# Patient Record
Sex: MALE | Race: BLACK OR AFRICAN AMERICAN | NOT HISPANIC OR LATINO | Employment: FULL TIME | URBAN - METROPOLITAN AREA
[De-identification: names, ages, dates, MRNs, and addresses within clinical notes are randomized per-mention and may not be internally consistent; named-entity substitution may affect disease eponyms.]

---

## 2017-01-31 ENCOUNTER — OFFICE VISIT (OUTPATIENT)
Dept: UROLOGY | Facility: CLINIC | Age: 62
End: 2017-01-31
Payer: COMMERCIAL

## 2017-01-31 ENCOUNTER — LAB VISIT (OUTPATIENT)
Dept: LAB | Facility: HOSPITAL | Age: 62
End: 2017-01-31
Attending: ANESTHESIOLOGY
Payer: COMMERCIAL

## 2017-01-31 ENCOUNTER — HOSPITAL ENCOUNTER (OUTPATIENT)
Dept: CARDIOLOGY | Facility: CLINIC | Age: 62
Discharge: HOME | End: 2017-01-31
Attending: ANESTHESIOLOGY
Payer: COMMERCIAL

## 2017-01-31 VITALS
DIASTOLIC BLOOD PRESSURE: 79 MMHG | BODY MASS INDEX: 32.48 KG/M2 | SYSTOLIC BLOOD PRESSURE: 148 MMHG | HEART RATE: 70 BPM | HEIGHT: 68 IN | WEIGHT: 214.31 LBS

## 2017-01-31 DIAGNOSIS — Z01.818 PREOP TESTING: ICD-10-CM

## 2017-01-31 DIAGNOSIS — C61 PROSTATE CANCER: Primary | ICD-10-CM

## 2017-01-31 DIAGNOSIS — C61 PROSTATE CANCER: ICD-10-CM

## 2017-01-31 LAB
ABO + RH BLD: NORMAL
ANION GAP SERPL CALC-SCNC: 11 MMOL/L
BLD GP AB SCN CELLS X3 SERPL QL: NORMAL
BUN SERPL-MCNC: 13 MG/DL
CALCIUM SERPL-MCNC: 9.7 MG/DL
CHLORIDE SERPL-SCNC: 103 MMOL/L
CO2 SERPL-SCNC: 26 MMOL/L
CREAT SERPL-MCNC: 1.1 MG/DL
ERYTHROCYTE [DISTWIDTH] IN BLOOD BY AUTOMATED COUNT: 14.1 %
EST. GFR  (AFRICAN AMERICAN): >60 ML/MIN/1.73 M^2
EST. GFR  (NON AFRICAN AMERICAN): >60 ML/MIN/1.73 M^2
GLUCOSE SERPL-MCNC: 111 MG/DL
HCT VFR BLD AUTO: 39.8 %
HGB BLD-MCNC: 13.2 G/DL
MCH RBC QN AUTO: 27.2 PG
MCHC RBC AUTO-ENTMCNC: 33.2 %
MCV RBC AUTO: 82 FL
PLATELET # BLD AUTO: 184 K/UL
PMV BLD AUTO: 10.5 FL
POTASSIUM SERPL-SCNC: 4 MMOL/L
RBC # BLD AUTO: 4.85 M/UL
SODIUM SERPL-SCNC: 140 MMOL/L
WBC # BLD AUTO: 4.08 K/UL

## 2017-01-31 PROCEDURE — 85027 COMPLETE CBC AUTOMATED: CPT

## 2017-01-31 PROCEDURE — 93000 ELECTROCARDIOGRAM COMPLETE: CPT | Mod: S$GLB,,, | Performed by: INTERNAL MEDICINE

## 2017-01-31 PROCEDURE — 99213 OFFICE O/P EST LOW 20 MIN: CPT | Mod: S$GLB,,, | Performed by: UROLOGY

## 2017-01-31 PROCEDURE — 3078F DIAST BP <80 MM HG: CPT | Mod: S$GLB,,, | Performed by: UROLOGY

## 2017-01-31 PROCEDURE — 99999 PR PBB SHADOW E&M-EST. PATIENT-LVL III: CPT | Mod: PBBFAC,,, | Performed by: UROLOGY

## 2017-01-31 PROCEDURE — 86900 BLOOD TYPING SEROLOGIC ABO: CPT

## 2017-01-31 PROCEDURE — 86850 RBC ANTIBODY SCREEN: CPT

## 2017-01-31 PROCEDURE — 80048 BASIC METABOLIC PNL TOTAL CA: CPT

## 2017-01-31 PROCEDURE — 1159F MED LIST DOCD IN RCRD: CPT | Mod: S$GLB,,, | Performed by: UROLOGY

## 2017-01-31 PROCEDURE — 99999 PR PBB SHADOW E&M-EST. PATIENT-LVL I: CPT | Mod: PBBFAC,,,

## 2017-01-31 PROCEDURE — 3077F SYST BP >= 140 MM HG: CPT | Mod: S$GLB,,, | Performed by: UROLOGY

## 2017-01-31 PROCEDURE — 99499 UNLISTED E&M SERVICE: CPT | Mod: S$GLB,,, | Performed by: STUDENT IN AN ORGANIZED HEALTH CARE EDUCATION/TRAINING PROGRAM

## 2017-01-31 NOTE — MR AVS SNAPSHOT
[facility]  [address]  [place] [address]  Phone: [phone]                  Sanchez Purcell   2017 1:45 PM   Office Visit    Description:  Male : 1955   Provider:  Champ Chavez MD   Department:  [facility]           Diagnoses this Visit        Comments    Prostate cancer    -  Primary            To Do List           Future Appointments        Provider Department Dept Phone    2017 2:30 PM PREOP,  UROLOGY CANCER [facility] [phone]    2017 10:15 AM Champ Chavez MD [facility] [phone]      Your Future Surgeries/Procedures     Feb 10, 2017   Surgery with Champ Chavez MD   [facility]    [address] [facility] [address] [phone]              Goals (5 Years of Data)     None      [facility]     [facility] Care Line - / Assistance  Registered nurses in the [facility] provide clinical advisement, health education, appointment booking, and other advisory services.  Call for this free service at [phone].             Medications           Message regarding Medications     Verify the changes and/or additions to your medication regime listed below are the same as discussed with your clinician today.  If any of these changes or additions are incorrect, please notify your healthcare provider.             Verify that the below list of medications is an accurate representation of the medications you are currently taking.  If none reported, the list may be blank. If incorrect, please contact your healthcare provider. Carry this list with you in case of emergency.           Current Medications     blood sugar diagnostic Strp Free style lite    losartan (COZAAR) 50 MG tablet TAKE 1 TABLET(50 MG) BY MOUTH EVERY DAY    metformin (GLUCOPHAGE) 1000 MG tablet TAKE 1 TABLET BY MOUTH EVERY 12 HOURS          "  Clinical Reference Information           Vital Signs - Last Recorded  Most recent update: 1/31/2017  1:27 PM by Nichole Rosas MA    BP Pulse Ht Wt BMI    (!) 148/79 (BP Location: Left arm, Patient Position: Sitting, BP Method: Automatic) 70 5' 8" (1.727 m) 97.2 kg (214 lb 4.6 oz) 32.58 kg/m2      Blood Pressure          Most Recent Value    BP  (!)  148/79      Allergies as of 1/31/2017     No Known Drug Allergies      Immunizations Administered on Date of Encounter - 1/31/2017     None      "

## 2017-01-31 NOTE — PROGRESS NOTES
Subjective:       Patient ID: Sanchez Purcell is a 61 y.o. male.    Chief Complaint: No chief complaint on file.    HPI Comments: Sanchez Purcell is a 61 y.o. male recently diagnosed with prostate cancer. Positive family history for prostate cancer, brother recently  from prostate cancer at 65. Consult from Dr. Cee. Here with wife Sarah. Used to work worked [employer].Continent and potent. Doing Kegels.     PSAi- 7.2  Stage- T1C  Volume- 32 ccs  Biopsy: [place] 3+4 left apex 2/2 80%, left middle 2/2 60%, right middle 1/2 15%: [place] 6 right apex 2/2 5%  STEPHANE score- 4  Intermediate risk disease     Past Medical History:  Diabetes mellitus   Diabetes mellitus type II ( 7 years)   Heart murmur    Hyperlipidemia   Hypertension    Review of Systems   Constitutional: Negative for appetite change, chills, fatigue, fever and unexpected weight change.   HENT: Negative for nosebleeds.    Respiratory: Negative for shortness of breath and wheezing.    Cardiovascular: Negative for chest pain, palpitations and leg swelling.   Gastrointestinal: Negative for abdominal distention, abdominal pain, constipation, diarrhea, nausea and vomiting.   Musculoskeletal: Negative for arthralgias and back pain.   Skin: Negative for pallor.   Neurological: Negative for dizziness, seizures and syncope.   Hematological: Negative for adenopathy.   Psychiatric/Behavioral: Negative for dysphoric mood.       Objective:      Physical Exam   Constitutional: He is oriented to person, place, and time. He appears well-developed and well-nourished. No distress.   HENT:   Head: Atraumatic.   Neck: No tracheal deviation present.   Cardiovascular: Normal rate.    Pulmonary/Chest: Effort normal. No respiratory distress. He has no wheezes.   Abdominal: Soft. Bowel sounds are normal. He exhibits no distension and no mass. There is no tenderness. There is no rebound and no guarding.   Neurological: He is alert and oriented to person, place, and time.    Skin: Skin is warm and dry. He is not diaphoretic.     Psychiatric: He has a normal mood and affect. His behavior is normal. Judgment and thought content normal.       Assessment:       1. Prostate cancer        Plan:         Today's visit was spent almost entirely on counseling. We reviewed his diagnosis, stage, grade, risk group, and prognosis. We discussed [place] and STEPHANE risk stratification We reviewed the [facility] nomogram. We discussed the concept of low risk, moderate risk, and high risk disease. We discussed the different treatment options including active surveillance (as well as the surveillance protocol), prostate brachytherapy, IMRT, IGRT, cryotherapy, and both open and robotic prostatectomy.We also discussed the advantages, disadvantages, risks and benefits, as well as complications of each option. Regarding radiation therapy we discussed treatment planning, the different techniques, short and long term complications. These included radiation cystitis, radiation proctitis, and impotence. We discussed success, failure, and salvage therapeutic options. We discussed surgical therapy in depth including preoperative preparation, surgical technique (including bladder neck and nerve-sparing techniques), postoperative recuperation and recovery, and short and long term complications including UTI, bleeding, blood clots,catheter dislodgement, etc. We discussed the risks of reoperation, incontinence, impotence, and recurrence. We discussed preop and postop Kegels, post op penile rehab, and treatment options for incontinence and impotence. We discussed rates of cancer free survival and recurrence, as well as salvage therapeutic options. We discussed the possible  indications for adjuvant radiation therapy. I answered questions and addressed concerns.   Is scheduled for robotic assisted laparoscopic prostatectomy on February 22 nd.

## 2017-01-31 NOTE — PROGRESS NOTES
Urology ([facility]) H&P for upcoming procedure  Staff:  Dr. Champ Chavez MD    Is this patient in a research study?  No    CC: prostate adencarcinoma    HPI:  Sanchez Purcell is a 61 y.o. male with [id number] Rylee 3+4=7 prostate adenocarcinoma.      The patient initially presented with elevated PSA.  TRUS biopsy revealed the following:       LEFT   RIGHT  BASE   benign   benign  MID   3+4, 2/2, 60%  3+4, 1/2, 15%  APEX   3+4, 2/2, 80%  3+3,1/2, 5%    Date of Biopsy: 2016  PSA: 7.2  Volume: 32 g  Voiding complaints: absent  ED: absent  Incontinence: absent    Patient has brother who  from prostate cancer at age 65. He has no voiding complaints. Denies hematuria.     ROS:  Neg except per HPI, specifically no bone pain, no unintentional weight loss, no anorexia, no night sweats    Past Medical History   Diagnosis Date    Diabetes mellitus     Diabetes mellitus type II     Heart murmur     Hyperlipidemia     Hypertension        Past Surgical History   Procedure Laterality Date    Colonoscopy       5yrs ago       Social History     Social History    Marital status:      Spouse name: N/A    Number of children: N/A    Years of education: N/A     Social History Main Topics    Smoking status: Never Smoker    Smokeless tobacco: Never Used    Alcohol use Yes      Comment: social    Drug use: No    Sexual activity: Yes     Partners: Female     Other Topics Concern    None     Social History Narrative       Family History   Problem Relation Age of Onset    Diabetes Mother     Cancer Sister      breast    Cancer Brother      colon    Cancer Sister      abd cancer    No Known Problems Father     No Known Problems Maternal Aunt     No Known Problems Maternal Uncle     No Known Problems Paternal Aunt     No Known Problems Paternal Uncle     No Known Problems Maternal Grandmother     No Known Problems Maternal Grandfather     No Known Problems Paternal Grandmother     No Known  Problems Paternal Grandfather     Amblyopia Neg Hx     Blindness Neg Hx     Cataracts Neg Hx     Glaucoma Neg Hx     Hypertension Neg Hx     Macular degeneration Neg Hx     Retinal detachment Neg Hx     Strabismus Neg Hx     Stroke Neg Hx     Thyroid disease Neg Hx        Review of patient's allergies indicates:   Allergen Reactions    No known drug allergies        Current Outpatient Prescriptions on File Prior to Visit   Medication Sig Dispense Refill    blood sugar diagnostic Strp Free style lite      losartan (COZAAR) 50 MG tablet TAKE 1 TABLET(50 MG) BY MOUTH EVERY DAY 90 tablet 3    metformin (GLUCOPHAGE) 1000 MG tablet TAKE 1 TABLET BY MOUTH EVERY 12 HOURS 180 tablet 3     No current facility-administered medications on file prior to visit.        Anticoagulation:  No    Physical Exam:  weight 214 lb/97.2 kg  BMI 32.5    AAOx4, NAD, WDWN  NC/AT, EOMI, PER, sclerae anicteric, speech normal, tongue midline  Nl effort, CTAB  RRR  Soft, non-tender, non-distended   Scars: none    Labs:  Urine dipstick today shows negative for all components.    Lab Results   Component Value Date    WBC 3.36 (L) 04/29/2015    HGB 12.9 (L) 04/29/2015    HCT 40.2 04/29/2015    MCV 84 04/29/2015     04/29/2015       BMP  Lab Results   Component Value Date     07/29/2016    K 4.2 07/29/2016     07/29/2016    CO2 28 07/29/2016    BUN 10 07/29/2016    CREATININE 1.1 07/29/2016    CALCIUM 10.0 07/29/2016    ANIONGAP 10 07/29/2016    ESTGFRAFRICA >60.0 07/29/2016    EGFRNONAA >60.0 07/29/2016       Lab Results   Component Value Date    PSA 7.2 (H) 07/29/2016    PSA 5.5 (H) 04/29/2015    PSA 2.8 09/26/2011    PSA 2.0 05/24/2010       Imaging:  Whole Body Bone Scan (9/26/16): No evidence of metastatic disease    CT AP w Contrast (9/16/16): Prostate with dystrophic calcifications, no evidence of surrounding lymphadenopathy or invasive mass    Assessment: Sanchez Purcell is a 61 y.o. male with [id number] Rylee  3+4=7 prostate adenocarcinoma.      Plan:   1. To OR on 2/10/2017 for RALP without BPLND  2. Consents signed   3. I have explained the risk, benefits, and alternatives of the procedure in detail. The patient voices understanding and all questions have been answered. The patient agrees to proceed as planned.       Keri Badillo MD

## 2017-02-02 ENCOUNTER — ANESTHESIA EVENT (OUTPATIENT)
Dept: SURGERY | Facility: HOSPITAL | Age: 62
DRG: 708 | End: 2017-02-02
Payer: COMMERCIAL

## 2017-02-02 NOTE — ANESTHESIA PREPROCEDURE EVALUATION
Anesthesia Assessment: Preoperative EQUATION     Planned Procedure: Procedure(s) (LRB):  ROBOTIC ASSISTED LAPAROSCOPIC PROSTATECTOMY (N/A)  Requested Anesthesia Type:General  Surgeon: Champ Chavez MD  Service: Urology  Known or anticipated Date of Surgery:2/10/2017     Surgeon notes: reviewed     Electronic QUestionnaire Assessment completed via nurse interview with patient.                    Sanchez Purcell N [[id number]] - 61 y.o. Male         Providers Outside of [facility]       No data to display        Surgical Risk Level       Surgical Risk Level:   3              caRDScore (Clinical Anesthesia Rapid Decision Score)         Very Low  Total Score: 9       9 Sum of Clinical Scores        caRDScores (Grouped)       caRDScore - Ane:   2                       caRDScore - CVD:   2                       caRDScore - Pul:   0                       caRDScore - Met:   5                       caRDScore - Phy:   0              caRDScore Items             Pre-admit from 2/10/2017 in [facility]      Anesthesia        Has large neck size >40cm (15.7in., large male shirt size, large male collar size >16)   Yes      Do you wake up frequently with an arm or leg temporarily numb or asleep?   Yes      CVD        Activity similar to best ability for maximal activity or exercise   METS 4      Diagnosed with high blood pressure   Yes      Typical BP runs <150/90   Yes      Known heart murmur / could be due to valve abnormality   Yes      Pulmonary        Metabolic        Type 2 Diabetes   Yes      Last A1C was 7-9   Yes      Is on oral Rx and/or non-insulin injectable for diabetes   Yes      Has hyperlipoproteinemia   Yes [resolved]      Physiologic          Flags       Red Flag Score:   0                       Yellow Flag Score:   2              Red Flags       No data to display        Yellow Flags             Pre-admit from 2/10/2017 in [facility]      Known heart murmur / could be due  to valve abnormality   Yes      Do you wake up frequently with an arm or leg temporarily numb or asleep?   Yes        PONV Risk Score (assumes periop narcotic use = +1, Max=4)       PONV Risk Score:   2              PONV Risk Factors  Total Score: 1       1 Non-Smoker at present        Sleep Apnea  Total Score: 0         LIO STOP-Bang Risk Factors (Max=8)  Total Score: 5       1 Has loud snoring      1 Takes medication for high blood pressure      1 Age >50      1 Has large neck size >40cm      1 Male        LIO Risk Level - 1 (Low), 2 (Moderate), 3 (High)       LIO Risk Level:   3              RCRI (Revised Cardiac Risk Indices of ACC/AHA guidelines, Max=6)  Total Score: 0         CAD Risk Factors  Total Score: 4       1 Male. Age >45      1 Diagnosed with high blood pressure      1 Has Diabetes      1 Has hyperlipoproteinemia        CHADS Score if applicable (history of atrial fib/flutter, Max=6)  Total Score: 2       1 Diagnosed with high blood pressure      1 Has Diabetes        Maximal Exercise Capacity             Pre-admit from 2/10/2017 in [facility]      Maximal Exercise Capacity   METS 4        Summary of Dependence  Total Score: 1       1 Is totally independent of others for activities of daily living        Phone Fraility Score (Max = 17)  Total Score: 0         Pain Factors       No data to display        Risk Triggers (Evidence-Based Risk Triggers)         Pulmonary Risk Triggers  Total Score: 0         Renal Risk Triggers  Total Score: 2       1 Diagnosed with high blood pressure      1 Type 2 Diabetes        Delirium Risk Triggers  Total Score: 0         Urologic Risk Triggers  Total Score: 0         Logistics         Pre-op Clinic Logistics  Total Score: 6       1 Has had anesthesia, either as adult or as a child      1 Takes medication for high blood pressure      1 Known heart murmur / could be due to valve abnormality      3 Last A1C was 7-9        DOSC Logistics  Total Score:  0         Discharge Logistics  Total Score: 0         Discharge Planning             Pre-admit from 2/10/2017 in [facility]      Discharge Planning        Discharge plans   Home with assistance      Will assist patient 24/7, if needed   -- [wife [place]]        Anes <For office use only>       Total Score: 8          Surgical Risk Level Assessment                       Triage considerations:      Pt has a past history of heart murmur  Previous anesthesia records:[facility] 7/8/15     Last PCP note: 6-12 months ago , within [facility] Dr. Gooden  Subspecialty notes: optometry     Other important co-morbidities: HTN/ DM2      Tests already available:  Available tests, within 1 month , within [facility] .      Instructions given. (See in Nurse's note)     Optimization:  Anesthesia Preop Clinic Assessment Indicated      Plan:    Pre-anesthesia visit   Visit focus: concerns in complex and/or prolonged anesthesia      Navigation: 61 yr old male cards score 8// xochitl 3// current labs// POC appoint // Pt hx of heart murmur in past. Denies any respiratory issues. Pt active. DM2 Last A1c 6.8  Phone  JAY Franklin RN BC 2/2/17      Electronically signed by Fartun Franklin RN at 2/2/2017 10:37 AM                                                                                                               02/02/2017  Sanchez Purcell is a 61 y.o., male.    Pre-operative evaluation for Procedure(s) (LRB):  ROBOTIC ASSISTED LAPAROSCOPIC PROSTATECTOMY (N/A)    Sanchez Purcell is a 61 y.o. male w/ h/o HTN (mod control), DM2 (improved control; A1c: 6.8) and prostate CA going for the above procedure.     Prev airway: none in system    Patient Active Problem List   Diagnosis    Hypertension    Hyperlipidemia    Type II or unspecified type diabetes mellitus without mention of complication, not stated as uncontrolled    Family history of prostate cancer    Prostate cancer       Review of patient's allergies indicates:    Allergen Reactions    No known drug allergies         No current facility-administered medications on file prior to encounter.      Current Outpatient Prescriptions on File Prior to Encounter   Medication Sig Dispense Refill    losartan (COZAAR) 50 MG tablet TAKE 1 TABLET(50 MG) BY MOUTH EVERY DAY 90 tablet 3    metformin (GLUCOPHAGE) 1000 MG tablet TAKE 1 TABLET BY MOUTH EVERY 12 HOURS 180 tablet 3    blood sugar diagnostic Strp Free style lite         Past Surgical History   Procedure Laterality Date    Colonoscopy       5yrs ago       Social History     Social History    Marital status:      Spouse name: N/A    Number of children: N/A    Years of education: N/A     Occupational History    Not on file.     Social History Main Topics    Smoking status: Never Smoker    Smokeless tobacco: Never Used    Alcohol use Yes      Comment: social    Drug use: No    Sexual activity: Yes     Partners: Female     Other Topics Concern    Not on file     Social History Narrative         Vital Signs Range (Last 24H):         Diagnostic Studies:      EKG:  Vent. Rate : 059 BPM     Atrial Rate : 059 BPM     P-R Int : 156 ms          QRS Dur : 086 ms      QT Int : 392 ms       P-R-T Axes : 042 002 007 degrees     QTc Int : 388 ms    Sinus bradycardia  Nonspecific T wave abnormality  Abnormal ECG  When compared with ECG of 24-MAY-2010 09:17,  No significant change was found  Confirmed by Brittani LUNA, Caroline ([id number]) on 1/31/2017 4:14:17 PM      [facility] Anesthesia Evaluation    I have reviewed the Patient Summary Reports.    I have reviewed the Nursing Notes.   I have reviewed the Medications.     Review of Systems  Anesthesia Hx:  No previous Anesthesia  History of prior surgery of interest to airway management or planning: Previous anesthesia: MAC  7/2015: Colonoscopy with MAC.  Denies Family Hx of Anesthesia complications.   Denies Personal Hx of Anesthesia complications.   Social:  Denies Tobacco Use. Alcohol Use: Pt  consumes weekends: soham (2),    Hematology/Oncology:         Prostate Current/Recent Cancer.   EENT/Dental:   Denies Throat Disease. Denies Jaw Problems   Cardiovascular:   Hypertension  Functional Capacity cuts grass; was able to walk from garage to POC: denies CP/SOB  Hypertension , Recent typical clinic B/P of 144/83 & 140/78 @ POC visit ( did not take meds this AM)    Pulmonary:  Pulmonary Normal  Denies Asthma.  Denies Chronic Obstructive Pulmonary Disease (COPD).  Possible Obstructive Sleep Apnea , (STOP/BANG) Symptoms S - Snoring (loud), P - Pressure being treated for high BP, A - Age > 50 and G - Gender (Male > Female)    Hepatic/GI:  Denies Esophageal / Stomach Disorders  Denies Liver Disease    Neurological:  Denies Seizure Disorder  Denies CVA - Cerebrovasular Accident  Denies TIA - Transient Ischemic Attack    Endocrine:   Diabetes, type 2  Diabetes , most recent HgA1c value was 6.8  Metabolic Disorders, Hyperlipoproteinemia      Physical Exam  General:  Well nourished    Airway/Jaw/Neck:  Airway Findings: General Airway Assessment: Adult Mallampati: IV  Improves to III with phonation.  TM Distance: Normal, at least 6 cm  Jaw/Neck Findings:  Neck ROM: Normal ROM  Neck Findings:  Girth Increased      Dental:  Dental Findings: Upper Dentures   Chest/Lungs:  Chest/Lungs Findings: Clear to auscultation, Normal Respiratory Rate     Heart/Vascular:  Heart Findings: Rate: Bradycardia  Rhythm: Regular Rhythm  Heart Murmur  Systolic  Systolic Heart Murmur Grade: Grade I  Vascular Findings: Normal    Abdomen:  Abdomen Findings: Normal    Musculoskeletal:  Musculoskeletal Findings: Normal   Skin:  Skin Findings: Normal    Mental Status:  Mental Status Findings:  Cooperative, Alert and Oriented         Inez Mitchell RN      Anesthesia Plan  Type of Anesthesia, risks & benefits discussed:  Anesthesia Type:  general  Patient's Preference:   Intra-op Monitoring Plan:   Intra-op Monitoring Plan Comments:   Post  Op Pain Control Plan:   Post Op Pain Control Plan Comments:   Induction:   IV  Beta Blocker:  Patient is not currently on a Beta-Blocker (No further documentation required).       Informed Consent: Patient understands risks and agrees with Anesthesia plan.  Questions answered. Anesthesia consent signed with patient.  ASA Score: 3     Day of Surgery Review of History & Physical:    H&P update referred to the surgeon.         Ready For Surgery From Anesthesia Perspective.

## 2017-02-06 ENCOUNTER — HOSPITAL ENCOUNTER (OUTPATIENT)
Dept: PREADMISSION TESTING | Facility: HOSPITAL | Age: 62
Discharge: HOME | End: 2017-02-06
Attending: ANESTHESIOLOGY
Payer: COMMERCIAL

## 2017-02-06 VITALS
BODY MASS INDEX: 33.04 KG/M2 | HEIGHT: 68 IN | TEMPERATURE: 98 F | HEART RATE: 68 BPM | WEIGHT: 218 LBS | RESPIRATION RATE: 16 BRPM | OXYGEN SATURATION: 96 % | DIASTOLIC BLOOD PRESSURE: 78 MMHG | SYSTOLIC BLOOD PRESSURE: 140 MMHG

## 2017-02-06 NOTE — DISCHARGE INSTRUCTIONS
Your surgery has been scheduled for:__________________________________________    You should report to:  ____[facility], located on the [place] side of the first floor of the           [facility] ([phone])  ____[facility], located on the [facility] side of the            Second floor of the [facility] ([phone])  ____3rd Floor SSCU located on the [facility] side of the [facility] [phone]  Please Note   - Tell your doctor if you take Aspirin, products containing Aspirin, herbal medications  or blood thinners, such as Coumadin, Ticlid, or Plavix.  (Consult your provider regarding holding or stopping before surgery).  - Arrange for someone to drive you home following surgery.  You will not be allowed to leave the surgical facility alone or drive yourself home following sedation and anesthesia.  Before Surgery  - Stop taking all herbal medications 14days prior to surgery  - No Motrin/Advil (Ibuprofen) 7 days before surgery  - No Aleve (Naproxen) 7 days before surgery  - Stop Taking Asprin, products containing Asprin _____days before surgery  - Stop taking blood thinners_______days before surgery  - Refrain from drinking alcoholic beverages for 24hours before and after surgery  - Stop or limit smoking _________days before surgery  Night before Surgery  - DO NOT EAT OR DRINK ANYTHING AFTER MIDNIGHT, INCLUDING GUM, HARD CANDY, MINTS, OR CHEWING TOBACCO.  - Take a shower or bath (shower is recommended).  Bathe with Hibiclens soap or an antibacterial soap from the neck down.  If not supplied by your surgeon, hibiclens soap will need to be purchased over the counter in pharmacy.  Rinse soap off thoroughly.  - Shampoo your hair with your regular shampoo  The Day of Surgery  - Take another bath or shower with hibiclens or any antibacterial soap, to reduce the chance of infection.  - Take heart and blood  pressure medications with a small sip of water, as advised by the perioperative team.  - Do not take fluid pills  - You may brush your teeth and rinse your mouth, but do not swall any additional water.   - Do not apply perfumes, powder, body lotions or deodorant on the day of surgery.  - Nail polish should be removed.  - Do not wear makeup or moisturizer  - Wear comfortable clothes, such as a button front shirt and loose fitting pants.  - Leave all jewelry, including body piercings, and valuables at home.    - Bring any devices you will neeed after surgery such as crutches or canes.  - If you have sleep apnea, please bring your CPAP machine  In the event that your physical condition changes including the onset of a cold or respiratory illness, or if you have to delay or cancel your surgery, please notify your surgeon.Anesthesia: General Anesthesia  Youre due to have surgery. During surgery, youll be given medication called anesthesia. (It is also called anesthetic.) This will keep you comfortable and pain-free. Your surgeon will use general anesthesia. This sheet tells you more about it.    What Is General Anesthesia?  General anesthesia puts you into a state like deep sleep. It goes into the bloodstream (IV anesthetics), into the lungs (gas anesthetics), or both. You feel nothing during the procedure. You will not remember it. During the procedure, the anesthesia provider monitors you. He or she checks your heart rate and rhythm, blood pressure, and blood oxygen.  · IV Anesthetics  IV anesthetics are given through an IV line in your arm. Theyre often given first. This is so you are asleep before a gas anesthetic is started. Some kinds of IV anesthetics relieve pain. Others relax you. Your doctor will decide which kind is best in your case.  · Gas Anesthetics  Gas anesthetics are breathed into the lungs. They are often used to keep you asleep. They can be given through a facemask, an endotracheal tube, or a  laryngeal mask airway.  ¨ If you have a facemask, your anesthesia provider will most likely place it over your nose and mouth while youre still awake. Youll breathe oxygen through the mask as your IV anesthetic is started. Gas anesthetic may be added through the mask.  ¨ If you have an endotracheal tube or a laryngeal mask airway, it will be inserted into your throat after youre asleep.  Anesthesia Tools and Medications  You will likely have:  · IV anesthetics sent through an IV line into your bloodstream.  · Gas anesthetics breathed into your lungs, where they pass into your bloodstream.  · A pulse oximeter on the end of your finger. This measures your blood oxygen level.  · Electrocardiography leads (electrodes) on your chest. These record your heart rate and rhythm.  · A blood pressure cuff. This reads your blood pressure.  Risks and Possible Complications  General anesthesia has some risks. These include:  · Breathing problems  · Nausea and vomiting  · Sore throat or hoarseness  · Allergic reaction to the anesthetic  · Ongoing numbness (rare)  · Irregular heartbeat (rare)  · Cardiac arrest (rare)   Anesthesia Safety  · Follow all instructions you are given for how long not to eat or drink before your procedure.  · Be sure your doctor knows what medications you take. This includes over-the-counter medications, herbs, and supplements.  · Have an adult family member or friend drive you home after the procedure.  · For the first 24 hours after your surgery:  ¨ Do not drive or use heavy equipment.  ¨ Do not make important decisions or sign documents.  ¨ Avoid alcohol.  ¨ Have someone stay with you, if possible. They can watch for problems and help keep you safe.  © 4634-4542 [employer], [address] [place] [address], [place] [address]. All rights reserved. This information is not intended as a substitute for professional medical care. Always follow your healthcare professional's instructions.

## 2017-02-06 NOTE — IP AVS SNAPSHOT
[facility]  [address]  [facility] [address]  Phone: [phone]           Patient Discharge Instructions    Our goal is to set you up for success. This packet includes information on your condition, medications, and your home care. It will help you to care for yourself so you don't get sicker.     Please ask your nurse if you have any questions.        There are many details to remember when preparing for your surgery. Here is what you will need to do, please ask your nurse if there are more specific instructions and if you have any questions:    1. 24 hours before procedure Do not smoke or drink alcoholic beverages 24 hours prior to your procedure    2. Eating before procedure Do not eat or drink anything 8 hours before your procedure - this includes gum, mints, and candy.     3. Day of procedure Please remove all jewelry for the procedure. If you wear contact lenses, dentures, hearing aids or glasses, bring a container to put them in during your surgery and give to a family member for safekeeping.  If your doctor has scheduled you for an overnight stay, bring a small overnight bag with any personal items that you need.    4. After procedure Make arrangements in advance for transportation home by a responsible adult. It is not safe to drive a vehicle during the 24 hours following surgery.     PLEASE NOTE: You may be contacted the day before your surgery to confirm your surgery date and arrival time. The Surgery schedule has many variables which may affect the time of your surgery case. Family members should be available if your surgery time changes.                [facility]  Unless otherwise directed by your provider, please contact [facility], our nurse care line that is available for 24/7 assistance.     [phone] (toll-free)    Registered nurses in the [facility] provide clinical advisement, health education, appointment booking, and other  advisory services.                    ** Verify the list of medication(s) below is accurate and up to date. Carry this with you in case of emergency. If your medications have changed, please notify your healthcare provider.             Medication List      TAKE these medications        Additional Info                      blood sugar diagnostic Strp   Refills:  0    Instructions:  Free style lite     Begin Date    AM    Noon    PM    Bedtime       losartan 50 MG tablet   Commonly known as:  COZAAR   Quantity:  90 tablet   Refills:  3    Instructions:  TAKE 1 TABLET(50 MG) BY MOUTH EVERY DAY     Begin Date    AM    Noon    PM    Bedtime       metformin 1000 MG tablet   Commonly known as:  GLUCOPHAGE   Quantity:  180 tablet   Refills:  3    Instructions:  TAKE 1 TABLET BY MOUTH EVERY 12 HOURS     Begin Date    AM    Noon    PM    Bedtime                  Please bring to all follow up appointments:    1. A copy of your discharge instructions.  2. All medicines you are currently taking in their original bottles.  3. Identification and insurance card.    Please arrive 15 minutes ahead of scheduled appointment time.    Please call 24 hours in advance if you must reschedule your appointment and/or time.        Your Scheduled Appointments     Feb 16, 2017 10:15 AM CST   Post OP with Champ Chavez MD   [facility] )    [address]  [place] [address] [phone]              Your Future Surgeries/Procedures     Feb 10, 2017   Surgery with Champ Chavez MD   [facility])    [address] [facility] [address] [phone]                  Discharge Instructions       Your surgery has been scheduled for:__________________________________________    You should report to:  ____[facility], located on the [place] side of the first floor of the           [facility] ([phone])  ____The  Second Floor Surgery Center, located on the [facility] side of the            Second floor of the [facility] ([phone])  ____3rd Floor SSCU located on the [facility] side of the [facility] [phone]  Please Note   - Tell your doctor if you take Aspirin, products containing Aspirin, herbal medications  or blood thinners, such as Coumadin, Ticlid, or Plavix.  (Consult your provider regarding holding or stopping before surgery).  - Arrange for someone to drive you home following surgery.  You will not be allowed to leave the surgical facility alone or drive yourself home following sedation and anesthesia.  Before Surgery  - Stop taking all herbal medications 14days prior to surgery  - No Motrin/Advil (Ibuprofen) 7 days before surgery  - No Aleve (Naproxen) 7 days before surgery  - Stop Taking Asprin, products containing Asprin _____days before surgery  - Stop taking blood thinners_______days before surgery  - Refrain from drinking alcoholic beverages for 24hours before and after surgery  - Stop or limit smoking _________days before surgery  Night before Surgery  - DO NOT EAT OR DRINK ANYTHING AFTER MIDNIGHT, INCLUDING GUM, HARD CANDY, MINTS, OR CHEWING TOBACCO.  - Take a shower or bath (shower is recommended).  Bathe with Hibiclens soap or an antibacterial soap from the neck down.  If not supplied by your surgeon, hibiclens soap will need to be purchased over the counter in pharmacy.  Rinse soap off thoroughly.  - Shampoo your hair with your regular shampoo  The Day of Surgery  - Take another bath or shower with hibiclens or any antibacterial soap, to reduce the chance of infection.  - Take heart and blood pressure medications with a small sip of water, as advised by the perioperative team.  - Do not take fluid pills  - You may brush your teeth and rinse your mouth, but do not swall any additional water.   - Do not apply perfumes, powder, body lotions or  deodorant on the day of surgery.  - Nail polish should be removed.  - Do not wear makeup or moisturizer  - Wear comfortable clothes, such as a button front shirt and loose fitting pants.  - Leave all jewelry, including body piercings, and valuables at home.    - Bring any devices you will neeed after surgery such as crutches or canes.  - If you have sleep apnea, please bring your CPAP machine  In the event that your physical condition changes including the onset of a cold or respiratory illness, or if you have to delay or cancel your surgery, please notify your surgeon.Anesthesia: General Anesthesia  Youre due to have surgery. During surgery, youll be given medication called anesthesia. (It is also called anesthetic.) This will keep you comfortable and pain-free. Your surgeon will use general anesthesia. This sheet tells you more about it.    What Is General Anesthesia?  General anesthesia puts you into a state like deep sleep. It goes into the bloodstream (IV anesthetics), into the lungs (gas anesthetics), or both. You feel nothing during the procedure. You will not remember it. During the procedure, the anesthesia provider monitors you. He or she checks your heart rate and rhythm, blood pressure, and blood oxygen.  · IV Anesthetics  IV anesthetics are given through an IV line in your arm. Theyre often given first. This is so you are asleep before a gas anesthetic is started. Some kinds of IV anesthetics relieve pain. Others relax you. Your doctor will decide which kind is best in your case.  · Gas Anesthetics  Gas anesthetics are breathed into the lungs. They are often used to keep you asleep. They can be given through a facemask, an endotracheal tube, or a laryngeal mask airway.  ¨ If you have a facemask, your anesthesia provider will most likely place it over your nose and mouth while youre still awake. Youll breathe oxygen through the mask as your IV anesthetic is started. Gas anesthetic may be added  through the mask.  ¨ If you have an endotracheal tube or a laryngeal mask airway, it will be inserted into your throat after youre asleep.  Anesthesia Tools and Medications  You will likely have:  · IV anesthetics sent through an IV line into your bloodstream.  · Gas anesthetics breathed into your lungs, where they pass into your bloodstream.  · A pulse oximeter on the end of your finger. This measures your blood oxygen level.  · Electrocardiography leads (electrodes) on your chest. These record your heart rate and rhythm.  · A blood pressure cuff. This reads your blood pressure.  Risks and Possible Complications  General anesthesia has some risks. These include:  · Breathing problems  · Nausea and vomiting  · Sore throat or hoarseness  · Allergic reaction to the anesthetic  · Ongoing numbness (rare)  · Irregular heartbeat (rare)  · Cardiac arrest (rare)   Anesthesia Safety  · Follow all instructions you are given for how long not to eat or drink before your procedure.  · Be sure your doctor knows what medications you take. This includes over-the-counter medications, herbs, and supplements.  · Have an adult family member or friend drive you home after the procedure.  · For the first 24 hours after your surgery:  ¨ Do not drive or use heavy equipment.  ¨ Do not make important decisions or sign documents.  ¨ Avoid alcohol.  ¨ Have someone stay with you, if possible. They can watch for problems and help keep you safe.  © 9632-8130 [facility], [address] [place] [address], [place] [address]. All rights reserved. This information is not intended as a substitute for professional medical care. Always follow your healthcare professional's instructions.    Discharge References/Attachments     PAIN MANAGEMENT AFTER SURGERY (ENGLISH)    PAIN AT HOME, MANAGING POST-OP: NON-MEDICATION RELIEF (ENGLISH)        Admission Information     Date & Time Provider Department [facility]    2/6/2017 11:00 AM Edmund Joy MD [facility] [id number]      Care Providers     Provider Role Specialty Primary office phone    Edmund Joy MD Attending Provider Anesthesiology [phone]      Your Vitals Were     BP Pulse Temp Resp Height Weight    140/78 68 98.1 °F (36.7 °C) 16 5' 8" (1.727 m) 98.9 kg (218 lb)    SpO2 BMI             96% 33.15 kg/m2         Recent Lab Values        5/24/2010 9/26/2011 2/28/2012 3/26/2012 8/14/2012 4/29/2015 7/29/2016         8:17 AM 12:33 PM  8:10 AM  9:56 AM 10:15 AM 10:30 AM  2:50 PM     A1C 7.9 (H) 8.6 (H) 9.1 (H) 8.3 (H) 6.9 (H) 7.0 (H) 6.8 (H)     Comment for A1C at  2:50 PM on 7/29/2016:  According to ADA guidelines, hemoglobin A1C <7.0% represents  optimal control in non-pregnant diabetic patients.  Different  metrics may apply to specific populations.   Standards of Medical Care in Diabetes - 2016.  For the purpose of screening for the presence of diabetes:  <5.7%     Consistent with the absence of diabetes  5.7-6.4%  Consistent with increasing risk for diabetes   (prediabetes)  >or=6.5%  Consistent with diabetes  Currently no consensus exists for use of hemoglobin A1C  for diagnosis of diabetes for children.        Allergies as of 2/6/2017        Reactions    No Known Drug Allergies       Advance Directives     An advance directive is a document which, in the event you are no longer able to make decisions for yourself, tells your healthcare team what kind of treatment you do or do not want to receive, or who you would like to make those decisions for you.  If you do not currently have an advance directive, [facility] encourages you to create one.  For more information call:  [phone]), [phone] ([phone]),  or log on to [email or web address].        Language Assistance Services     ATTENTION: Language assistance services are available, free of charge. Please call [phone].      ATENCIÓN: Si habla español, tiene a wolfe disposición servicios gratuitos de asistencia " lingüística. Kisha al [phone].     FLAKO Ý: N?u b?n nói Ti?ng Vi?t, có các d?ch v? h? tr? ngôn ng? mi?n phí dành cho b?n. G?i s? [phone].        Diabetes Discharge Instructions                                    [facility] complies with applicable Federal civil rights laws and does not discriminate on the basis of race, color, national origin, age, disability, or sex.

## 2017-02-10 ENCOUNTER — SURGERY (OUTPATIENT)
Age: 62
End: 2017-02-10

## 2017-02-10 ENCOUNTER — ANESTHESIA (OUTPATIENT)
Dept: SURGERY | Facility: HOSPITAL | Age: 62
DRG: 708 | End: 2017-02-10
Payer: COMMERCIAL

## 2017-02-10 PROCEDURE — 63600175 PHARM REV CODE 636 W HCPCS: Performed by: STUDENT IN AN ORGANIZED HEALTH CARE EDUCATION/TRAINING PROGRAM

## 2017-02-10 PROCEDURE — 25000003 PHARM REV CODE 250: Performed by: STUDENT IN AN ORGANIZED HEALTH CARE EDUCATION/TRAINING PROGRAM

## 2017-02-10 PROCEDURE — D9220A PRA ANESTHESIA: Mod: ,,, | Performed by: ANESTHESIOLOGY

## 2017-02-10 PROCEDURE — 25000003 PHARM REV CODE 250: Performed by: UROLOGY

## 2017-02-10 PROCEDURE — 27100025 HC TUBING, SET FLUID WARMER: Performed by: STUDENT IN AN ORGANIZED HEALTH CARE EDUCATION/TRAINING PROGRAM

## 2017-02-10 RX ORDER — MIDAZOLAM HYDROCHLORIDE 1 MG/ML
INJECTION INTRAMUSCULAR; INTRAVENOUS
Status: DISCONTINUED | OUTPATIENT
Start: 2017-02-10 | End: 2017-02-10

## 2017-02-10 RX ORDER — PROPOFOL 10 MG/ML
VIAL (ML) INTRAVENOUS
Status: DISCONTINUED | OUTPATIENT
Start: 2017-02-10 | End: 2017-02-10

## 2017-02-10 RX ORDER — KETAMINE HCL IN 0.9 % NACL 50 MG/5 ML
SYRINGE (ML) INTRAVENOUS
Status: DISCONTINUED | OUTPATIENT
Start: 2017-02-10 | End: 2017-02-10

## 2017-02-10 RX ORDER — ACETAMINOPHEN 10 MG/ML
INJECTION, SOLUTION INTRAVENOUS
Status: DISCONTINUED | OUTPATIENT
Start: 2017-02-10 | End: 2017-02-10

## 2017-02-10 RX ORDER — ONDANSETRON HYDROCHLORIDE 2 MG/ML
INJECTION, SOLUTION INTRAMUSCULAR; INTRAVENOUS
Status: DISCONTINUED | OUTPATIENT
Start: 2017-02-10 | End: 2017-02-10

## 2017-02-10 RX ORDER — DEXAMETHASONE SODIUM PHOSPHATE 4 MG/ML
INJECTION, SOLUTION INTRA-ARTICULAR; INTRALESIONAL; INTRAMUSCULAR; INTRAVENOUS; SOFT TISSUE
Status: DISCONTINUED | OUTPATIENT
Start: 2017-02-10 | End: 2017-02-10

## 2017-02-10 RX ORDER — LIDOCAINE HYDROCHLORIDE ANHYDROUS AND DEXTROSE MONOHYDRATE .8; 5 G/100ML; G/100ML
INJECTION, SOLUTION INTRAVENOUS CONTINUOUS PRN
Status: DISCONTINUED | OUTPATIENT
Start: 2017-02-10 | End: 2017-02-10

## 2017-02-10 RX ORDER — KETOROLAC TROMETHAMINE 30 MG/ML
INJECTION, SOLUTION INTRAMUSCULAR; INTRAVENOUS
Status: DISCONTINUED | OUTPATIENT
Start: 2017-02-10 | End: 2017-02-10

## 2017-02-10 RX ORDER — GLYCOPYRROLATE 0.2 MG/ML
INJECTION INTRAMUSCULAR; INTRAVENOUS
Status: DISCONTINUED | OUTPATIENT
Start: 2017-02-10 | End: 2017-02-10

## 2017-02-10 RX ORDER — ROCURONIUM BROMIDE 10 MG/ML
INJECTION, SOLUTION INTRAVENOUS
Status: DISCONTINUED | OUTPATIENT
Start: 2017-02-10 | End: 2017-02-10

## 2017-02-10 RX ADMIN — PROPOFOL 50 MG: 10 INJECTION, EMULSION INTRAVENOUS at 08:02

## 2017-02-10 RX ADMIN — LIDOCAINE HYDROCHLORIDE 0.03 MG/KG/MIN: 8 INJECTION, SOLUTION INTRAVENOUS at 07:02

## 2017-02-10 RX ADMIN — ONDANSETRON 4 MG: 2 INJECTION, SOLUTION INTRAMUSCULAR; INTRAVENOUS at 07:02

## 2017-02-10 RX ADMIN — ACETAMINOPHEN 1000 MG: 10 INJECTION, SOLUTION INTRAVENOUS at 07:02

## 2017-02-10 RX ADMIN — GLYCOPYRROLATE 0.4 MG: 0.2 INJECTION, SOLUTION INTRAMUSCULAR; INTRAVENOUS at 11:02

## 2017-02-10 RX ADMIN — KETOROLAC TROMETHAMINE 30 MG: 30 INJECTION, SOLUTION INTRAMUSCULAR; INTRAVENOUS at 07:02

## 2017-02-10 RX ADMIN — ROCURONIUM BROMIDE 30 MG: 10 INJECTION, SOLUTION INTRAVENOUS at 08:02

## 2017-02-10 RX ADMIN — Medication 2 G: at 07:02

## 2017-02-10 RX ADMIN — DEXMEDETOMIDINE HYDROCHLORIDE 0.5 MCG/KG/HR: 100 INJECTION, SOLUTION, CONCENTRATE INTRAVENOUS at 07:02

## 2017-02-10 RX ADMIN — PROPOFOL 150 MG: 10 INJECTION, EMULSION INTRAVENOUS at 07:02

## 2017-02-10 RX ADMIN — ROCURONIUM BROMIDE 50 MG: 10 INJECTION, SOLUTION INTRAVENOUS at 07:02

## 2017-02-10 RX ADMIN — SODIUM CHLORIDE, SODIUM GLUCONATE, SODIUM ACETATE, POTASSIUM CHLORIDE, MAGNESIUM CHLORIDE, SODIUM PHOSPHATE, DIBASIC, AND POTASSIUM PHOSPHATE: .53; .5; .37; .037; .03; .012; .00082 INJECTION, SOLUTION INTRAVENOUS at 10:02

## 2017-02-10 RX ADMIN — GLYCOPYRROLATE 0.2 MG: 0.2 INJECTION, SOLUTION INTRAMUSCULAR; INTRAVENOUS at 11:02

## 2017-02-10 RX ADMIN — PROPOFOL 50 MG: 10 INJECTION, EMULSION INTRAVENOUS at 07:02

## 2017-02-10 RX ADMIN — DEXAMETHASONE SODIUM PHOSPHATE 8 MG: 4 INJECTION, SOLUTION INTRAMUSCULAR; INTRAVENOUS at 07:02

## 2017-02-10 RX ADMIN — SODIUM CHLORIDE, SODIUM GLUCONATE, SODIUM ACETATE, POTASSIUM CHLORIDE, MAGNESIUM CHLORIDE, SODIUM PHOSPHATE, DIBASIC, AND POTASSIUM PHOSPHATE: .53; .5; .37; .037; .03; .012; .00082 INJECTION, SOLUTION INTRAVENOUS at 07:02

## 2017-02-10 RX ADMIN — Medication 20 MG: at 09:02

## 2017-02-10 RX ADMIN — SODIUM CHLORIDE: 0.9 INJECTION, SOLUTION INTRAVENOUS at 07:02

## 2017-02-10 RX ADMIN — BUPIVACAINE 20 ML: 13.3 INJECTION, SUSPENSION, LIPOSOMAL INFILTRATION at 08:02

## 2017-02-10 RX ADMIN — Medication 40 MG: at 07:02

## 2017-02-10 RX ADMIN — Medication 20 MG: at 08:02

## 2017-02-10 RX ADMIN — MIDAZOLAM HYDROCHLORIDE 2 MG: 1 INJECTION, SOLUTION INTRAMUSCULAR; INTRAVENOUS at 07:02

## 2017-02-10 NOTE — ANESTHESIA RELEASE NOTE
"Anesthesia Release from PACU Note    Patient: Sanchez Purcell    Procedure(s) Performed: Procedure(s) (LRB):  ROBOTIC ASSISTED LAPAROSCOPIC PROSTATECTOMY (N/A)    Anesthesia type: general    Post pain: Adequate analgesia    Post assessment: no apparent anesthetic complications, tolerated procedure well and no evidence of recall    Last Vitals:   Visit Vitals    /69    Pulse 61    Temp 35.9 °C (96.6 °F) (Axillary)    Resp 18    Ht 5' 8" (1.727 m)    Wt 98.9 kg (218 lb)    SpO2 100%    BMI 33.15 kg/m2       Post vital signs: stable    Level of consciousness: awake, alert  and oriented    Nausea/Vomiting: no nausea/no vomiting    Complications: none    Airway Patency: patent    Respiratory: unassisted, spontaneous ventilation, room air    Cardiovascular: stable and blood pressure at baseline    Hydration: euvolemic  "

## 2017-02-10 NOTE — TRANSFER OF CARE
"Anesthesia Transfer of Care Note    Patient: Sanchez Purcell    Procedure(s) Performed: Procedure(s) (LRB):  ROBOTIC ASSISTED LAPAROSCOPIC PROSTATECTOMY (N/A)    Patient location: PACU    Anesthesia Type: general    Transport from OR: Transported from OR on 6-10 L/min O2 by face mask with adequate spontaneous ventilation    Post pain: adequate analgesia    Post assessment: no apparent anesthetic complications    Post vital signs: stable    Level of consciousness: awake    Nausea/Vomiting: no nausea/vomiting    Complications: none          Last vitals:   Visit Vitals    /67 (BP Location: Right arm, Patient Position: Lying, BP Method: Automatic)    Pulse (!) 55    Temp 35.9 °C (96.6 °F) (Axillary)    Resp 16    Ht 5' 8" (1.727 m)    Wt 98.9 kg (218 lb)    SpO2 100%    BMI 33.15 kg/m2     "

## 2017-02-10 NOTE — ANESTHESIA POSTPROCEDURE EVALUATION
"Anesthesia Post Evaluation    Patient: Sanchez Purcell    Procedure(s) Performed: Procedure(s) (LRB):  ROBOTIC ASSISTED LAPAROSCOPIC PROSTATECTOMY (N/A)    Final Anesthesia Type: general  Patient location during evaluation: PACU  Patient participation: Yes- Able to Participate  Level of consciousness: awake and alert  Post-procedure vital signs: reviewed and stable  Pain management: adequate  Airway patency: patent  PONV status at discharge: No PONV  Anesthetic complications: no      Cardiovascular status: blood pressure returned to baseline  Respiratory status: unassisted, spontaneous ventilation and room air  Hydration status: euvolemic  Follow-up not needed.        Visit Vitals    /69    Pulse 61    Temp 35.9 °C (96.6 °F) (Axillary)    Resp 18    Ht 5' 8" (1.727 m)    Wt 98.9 kg (218 lb)    SpO2 100%    BMI 33.15 kg/m2       Pain/Raj Score: Pain Assessment Performed: Yes (2/10/2017 12:30 PM)  Presence of Pain: denies (2/10/2017 12:30 PM)  Pain Rating Prior to Med Admin: 5 (2/10/2017  1:20 PM)  Raj Score: 8 (2/10/2017 12:30 PM)      "

## 2017-02-13 ENCOUNTER — TELEPHONE (OUTPATIENT)
Dept: UROLOGY | Facility: CLINIC | Age: 62
End: 2017-02-13

## 2017-02-13 NOTE — TELEPHONE ENCOUNTER
----- Message from Coco Alberto MA sent at 2/13/2017 11:36 AM CST -----  Please call pt wife, he is in pain and they have questions.

## 2017-02-16 ENCOUNTER — OFFICE VISIT (OUTPATIENT)
Dept: UROLOGY | Facility: CLINIC | Age: 62
End: 2017-02-16
Payer: COMMERCIAL

## 2017-02-16 VITALS
DIASTOLIC BLOOD PRESSURE: 90 MMHG | WEIGHT: 219.56 LBS | HEART RATE: 70 BPM | HEIGHT: 68 IN | SYSTOLIC BLOOD PRESSURE: 169 MMHG | BODY MASS INDEX: 33.28 KG/M2

## 2017-02-16 DIAGNOSIS — C61 PROSTATE CANCER: Primary | ICD-10-CM

## 2017-02-16 PROCEDURE — 99499 UNLISTED E&M SERVICE: CPT | Mod: S$GLB,,, | Performed by: UROLOGY

## 2017-02-16 PROCEDURE — 99999 PR PBB SHADOW E&M-EST. PATIENT-LVL III: CPT | Mod: PBBFAC,,, | Performed by: UROLOGY

## 2017-02-16 NOTE — PROGRESS NOTES
Subjective:       Patient ID: Sanchez Purcell is a 61 y.o. male.    Chief Complaint: No chief complaint on file.    HPI Comments: Sanchez Purcell is a 61 y.o. male s/p robotic assisted laparoscopic prostatectomy on 2/10/17.  Urine is clear. Good appetite, normal bowel movements. Using pain pills some but has decreased a lot yesterday. Went 9 hours yesterday without.    Path:  Histologic grade: Rylee pattern  Primary pattern: 3  Secondary pattern: 4  Total Rylee score: 7  Tumor quantitation:  Proportion (percentage) of prostate involved by tumor: 15%  Extraprostatic extension: Not identified  Seminal vesicle invasion: Not identified  Margins: Focally positive for prostatic adenocarcinoma in the prostatic apex  Treatment effect on carcinoma: Not identified  Lymphovascular invasion: Not identified  Perineural invasion: Present  Pathologic staging:  Primary tumor:  pT2c.: Organ confined bilateral disease    Past Medical History:    Diabetes mellitus                                             Diabetes mellitus type II                                     Heart murmur                                    1990's        Hyperlipidemia                                                Hypertension                                                   Past Surgical History:    COLONOSCOPY                                                      Comment:5yrs ago     Review of Systems   Constitutional: Negative for appetite change, chills, fatigue, fever and unexpected weight change.   HENT: Negative for nosebleeds.    Respiratory: Negative for shortness of breath and wheezing.    Cardiovascular: Negative for chest pain, palpitations and leg swelling.   Gastrointestinal: Negative for abdominal distention, abdominal pain, constipation, diarrhea, nausea and vomiting.   Musculoskeletal: Negative for arthralgias and back pain.   Skin: Negative for pallor.   Neurological: Negative for dizziness, seizures and syncope.   Hematological:  Negative for adenopathy.   Psychiatric/Behavioral: Negative for dysphoric mood.       Objective:      Physical Exam   Abdominal:   Incisions healing well. No infection.       Assessment:       1. Prostate cancer        Plan:         D/C kim. 1 month with PSA.

## 2017-02-16 NOTE — MR AVS SNAPSHOT
[facility]  [address]  [facility] [address]  Phone: [phone]                  Sanchez Purcell   2017 10:15 AM   Office Visit    Description:  Male : 1955   Provider:  Champ Chavez MD   [facility]  [facility]           Diagnoses this Visit        Comments    Prostate cancer    -  Primary            To Do List           Future Appointments        Provider Department Dept Phone    2017 10:15 AM MD [facility] [phone]      Goals (5 Years of Data)     None      Follow-Up and Disposition     Return in about 4 weeks (around 3/16/2017).      [facility]     [facility] Care [facility] -  Assistance  Registered nurses in the [facility] Center provide clinical advisement, health education, appointment booking, and other advisory services.  Call for this free service at [phone].             Medications           Message regarding Medications     Verify the changes and/or additions to your medication regime listed below are the same as discussed with your clinician today.  If any of these changes or additions are incorrect, please notify your healthcare provider.        STOP taking these medications     polyethylene glycol (GLYCOLAX) 17 gram PwPk Take 17 g by mouth once daily.           Verify that the below list of medications is an accurate representation of the medications you are currently taking.  If none reported, the list may be blank. If incorrect, please contact your healthcare provider. Carry this list with you in case of emergency.           Current Medications     blood sugar diagnostic Strp Free style lite    losartan (COZAAR) 50 MG tablet TAKE 1 TABLET(50 MG) BY MOUTH EVERY DAY    metformin (GLUCOPHAGE) 1000 MG tablet TAKE 1 TABLET BY MOUTH EVERY 12 HOURS    oxycodone-acetaminophen (PERCOCET)  mg per tablet Take 1 tablet by mouth every 4 (four) hours as needed for Pain.  "          Clinical Reference Information           Your Vitals Were     BP Pulse Height Weight BMI    169/90 (BP Location: Right arm, Patient Position: Sitting, BP Method: Automatic) 70 5' 8" (1.727 m) 99.6 kg (219 lb 9.3 oz) 33.39 kg/m2      Blood Pressure          Most Recent Value    BP  (!)  169/90      Allergies as of 2/16/2017     No Known Drug Allergies      Immunizations Administered on Date of Encounter - 2/16/2017     None      Orders Placed During Today's Visit     Future Labs/Procedures Expected by Expires    Prostate Specific Antigen, Diagnostic  3/19/2017 4/17/2018      Language Assistance Services     ATTENTION: Language assistance services are available, free of charge. Please call [phone].      ATENCIÓN: Si lainela alexander, tiene a wolfe disposición servicios gratuitos de asistencia lingüística. Llame al [phone].     CHÚ Ý: N?u b?n nói Ti?ng Vi?t, có các d?ch v? h? tr? ngôn ng? mi?n phí dành cho b?n. G?i s? [phone].         [facility] complies with applicable Federal civil rights laws and does not discriminate on the basis of race, color, national origin, age, disability, or sex.        "

## 2017-03-14 ENCOUNTER — LAB VISIT (OUTPATIENT)
Dept: LAB | Facility: HOSPITAL | Age: 62
End: 2017-03-14
Attending: UROLOGY
Payer: COMMERCIAL

## 2017-03-14 DIAGNOSIS — C61 PROSTATE CANCER: ICD-10-CM

## 2017-03-14 LAB — COMPLEXED PSA SERPL-MCNC: 0.01 NG/ML

## 2017-03-14 PROCEDURE — 84153 ASSAY OF PSA TOTAL: CPT

## 2017-03-14 PROCEDURE — 36415 COLL VENOUS BLD VENIPUNCTURE: CPT | Mod: PO

## 2017-03-16 ENCOUNTER — OFFICE VISIT (OUTPATIENT)
Dept: UROLOGY | Facility: CLINIC | Age: 62
End: 2017-03-16
Payer: COMMERCIAL

## 2017-03-16 VITALS
HEIGHT: 68 IN | SYSTOLIC BLOOD PRESSURE: 155 MMHG | BODY MASS INDEX: 31.87 KG/M2 | WEIGHT: 210.31 LBS | DIASTOLIC BLOOD PRESSURE: 79 MMHG | HEART RATE: 68 BPM

## 2017-03-16 DIAGNOSIS — C61 PROSTATE CANCER: Primary | ICD-10-CM

## 2017-03-16 PROCEDURE — 99999 PR PBB SHADOW E&M-EST. PATIENT-LVL III: CPT | Mod: PBBFAC,,, | Performed by: UROLOGY

## 2017-03-16 PROCEDURE — 99499 UNLISTED E&M SERVICE: CPT | Mod: S$GLB,,, | Performed by: UROLOGY

## 2017-03-16 RX ORDER — TADALAFIL 20 MG/1
20 TABLET ORAL DAILY PRN
Qty: 10 TABLET | Refills: 11 | Status: SHIPPED | OUTPATIENT
Start: 2017-03-16 | End: 2017-07-18 | Stop reason: SDUPTHER

## 2017-03-16 NOTE — PROGRESS NOTES
Subjective:       Patient ID: Sanchez Purcell is a 61 y.o. male.    Chief Complaint: No chief complaint on file.    HPI Comments: Sanchez Purcell is a 61 y.o. male s/p robotic assisted laparoscopic prostatectomy on 2/10/17.  Wears confidence pad during the day, has not leaked a drop in over 2 weeks. Doing kegels.    Lab Results       Component                Value               Date       PSADIAG              0.01                03/14/2017                                 PSA                      7.2 (H)             07/29/2016                 PSA                      5.5 (H)             04/29/2015                 PSA                      2.8                   09/26/2011                 PSA                      2.0                   05/24/2010                 Path:  Histologic grade: Nakina pattern  Primary pattern: 3  Secondary pattern: 4  Total Rylee score: 7  Tumor quantitation:  Proportion (percentage) of prostate involved by tumor: 15%  Extraprostatic extension: Not identified  Seminal vesicle invasion: Not identified  Margins: Focally positive for prostatic adenocarcinoma in the prostatic apex  Treatment effect on carcinoma: Not identified  Lymphovascular invasion: Not identified  Perineural invasion: Present  Pathologic staging:  Primary tumor:  pT2c.: Organ confined bilateral disease      Past Medical History:   Diagnosis Date    Diabetes mellitus     Diabetes mellitus type II     Heart murmur 1990's    Hyperlipidemia     Hypertension         Past Surgical History:   Procedure Laterality Date    COLONOSCOPY      5yrs ago      Review of Systems    Objective:      Physical Exam   Abdominal:   Incisions healing well no infection.       Assessment:       1. Prostate cancer        Plan:     Discussed follow up and penile rehab.

## 2017-03-16 NOTE — MR AVS SNAPSHOT
[facility]  [address]  [facility] [address]  Phone: [phone]                  Sanchez Purcell   3/16/2017 9:30 AM   Office Visit    Description:  Male : 1955   Provider:  Champ Chavez MD   Department:  [facility]           Diagnoses this Visit        Comments    Prostate cancer    -  Primary            To Do List           Goals (5 Years of Data)     None      Follow-Up and Disposition     Return in about 4 months (around 2017).       These Medications        Disp Refills Start End    tadalafil (CIALIS) 20 MG Tab 10 tablet 11 3/16/2017 3/26/2017    Take 1 tablet (20 mg total) by mouth daily as needed. - Oral    Pharmacy: [facility]  CAYDEN Knight  [address]  #: [phone]         [facility]     [facility] On Call Nurse Care Line -  Assistance  Registered nurses in the [facility] On Call Center provide clinical advisement, health education, appointment booking, and other advisory services.  Call for this free service at [phone].             Medications           Message regarding Medications     Verify the changes and/or additions to your medication regime listed below are the same as discussed with your clinician today.  If any of these changes or additions are incorrect, please notify your healthcare provider.        START taking these NEW medications        Refills    tadalafil (CIALIS) 20 MG Tab 11    Sig: Take 1 tablet (20 mg total) by mouth daily as needed.    Class: Normal    Route: Oral      STOP taking these medications     oxycodone-acetaminophen (PERCOCET)  mg per tablet Take 1 tablet by mouth every 4 (four) hours as needed for Pain.           Verify that the below list of medications is an accurate representation of the medications you are currently taking.  If none reported, the list may be blank. If incorrect, please contact your healthcare provider. Carry this list with you in case of  "emergency.           Current Medications     blood sugar diagnostic Strp Free style lite    losartan (COZAAR) 50 MG tablet TAKE 1 TABLET(50 MG) BY MOUTH EVERY DAY    metformin (GLUCOPHAGE) 1000 MG tablet TAKE 1 TABLET BY MOUTH EVERY 12 HOURS    tadalafil (CIALIS) 20 MG Tab Take 1 tablet (20 mg total) by mouth daily as needed.           Clinical Reference Information           Your Vitals Were     BP Pulse Height Weight BMI    155/79 (BP Location: Left arm, Patient Position: Sitting, BP Method: Automatic) 68 5' 8" (1.727 m) 95.4 kg (210 lb 5.1 oz) 31.98 kg/m2      Blood Pressure          Most Recent Value    BP  (!)  155/79      Allergies as of 3/16/2017     No Known Drug Allergies      Immunizations Administered on Date of Encounter - 3/16/2017     None      Language Assistance Services     ATTENTION: Language assistance services are available, free of charge. Please call [phone].      ATENCIÓN: Si habla español, tiene a wolfe disposición servicios gratuitos de asistencia lingüística. Llame al [phone].     FLAKO Ý: N?u b?n nói Ti?ng Vi?t, có các d?ch v? h? tr? ngôn ng? mi?n phí dành cho b?n. G?i s? [phone].         [facility] complies with applicable Federal civil rights laws and does not discriminate on the basis of race, color, national origin, age, disability, or sex.        "

## 2017-07-17 ENCOUNTER — LAB VISIT (OUTPATIENT)
Dept: LAB | Facility: HOSPITAL | Age: 62
End: 2017-07-17
Attending: UROLOGY
Payer: COMMERCIAL

## 2017-07-17 DIAGNOSIS — C61 PROSTATE CANCER: ICD-10-CM

## 2017-07-17 LAB — COMPLEXED PSA SERPL-MCNC: <0.01 NG/ML

## 2017-07-17 PROCEDURE — 36415 COLL VENOUS BLD VENIPUNCTURE: CPT | Mod: PO

## 2017-07-17 PROCEDURE — 84153 ASSAY OF PSA TOTAL: CPT

## 2017-07-18 ENCOUNTER — OFFICE VISIT (OUTPATIENT)
Dept: UROLOGY | Facility: CLINIC | Age: 62
End: 2017-07-18
Payer: COMMERCIAL

## 2017-07-18 VITALS
SYSTOLIC BLOOD PRESSURE: 137 MMHG | DIASTOLIC BLOOD PRESSURE: 76 MMHG | RESPIRATION RATE: 16 BRPM | HEIGHT: 68 IN | HEART RATE: 63 BPM | WEIGHT: 204.38 LBS | BODY MASS INDEX: 30.98 KG/M2

## 2017-07-18 DIAGNOSIS — C61 PROSTATE CANCER: Primary | ICD-10-CM

## 2017-07-18 PROCEDURE — 99214 OFFICE O/P EST MOD 30 MIN: CPT | Mod: S$GLB,,, | Performed by: UROLOGY

## 2017-07-18 PROCEDURE — 99999 PR PBB SHADOW E&M-EST. PATIENT-LVL III: CPT | Mod: PBBFAC,,, | Performed by: UROLOGY

## 2017-07-18 RX ORDER — TADALAFIL 20 MG/1
TABLET, FILM COATED ORAL
COMMUNITY
Start: 2017-05-23 | End: 2020-03-06

## 2017-07-18 RX ORDER — PEN NEEDLE, DIABETIC 31 GX5/16"
NEEDLE, DISPOSABLE MISCELLANEOUS
COMMUNITY
Start: 2017-07-05 | End: 2021-06-09

## 2017-07-18 RX ORDER — TADALAFIL 20 MG/1
20 TABLET ORAL DAILY PRN
Qty: 10 TABLET | Refills: 11 | Status: SHIPPED | OUTPATIENT
Start: 2017-07-18 | End: 2017-07-28

## 2017-07-18 NOTE — PROGRESS NOTES
Clinic Note  7/18/2017      Subjective:         Chief Complaint:   HPI  Sanchez Purcell is a 61 y.o. male s/p robotic assisted laparoscopic prostatectomy on 2/10/17.  Pad free, continent. Doing kegels.  Doing penile rehab, getting partials erections.  They do line dancing once a week.      Lab Results   Component Value Date    PSA 7.2 (H) 07/29/2016    PSA 5.5 (H) 04/29/2015    PSA 2.8 09/26/2011    PSA 2.0 05/24/2010    PSADIAG <0.01 07/17/2017    PSADIAG 0.01 03/14/2017      Past Medical History:   Diagnosis Date    Diabetes mellitus     Diabetes mellitus type II     Heart murmur 1990's    Hyperlipidemia     Hypertension      Family History   Problem Relation Age of Onset    Diabetes Mother     Cancer Sister      breast    Cancer Brother      colon    Cancer Sister      abd cancer    No Known Problems Father     No Known Problems Maternal Aunt     No Known Problems Maternal Uncle     No Known Problems Paternal Aunt     No Known Problems Paternal Uncle     No Known Problems Maternal Grandmother     No Known Problems Maternal Grandfather     No Known Problems Paternal Grandmother     No Known Problems Paternal Grandfather     Amblyopia Neg Hx     Blindness Neg Hx     Cataracts Neg Hx     Glaucoma Neg Hx     Hypertension Neg Hx     Macular degeneration Neg Hx     Retinal detachment Neg Hx     Strabismus Neg Hx     Stroke Neg Hx     Thyroid disease Neg Hx      Social History     Social History    Marital status:      Spouse name: N/A    Number of children: N/A    Years of education: N/A     Occupational History    Not on file.     Social History Main Topics    Smoking status: Never Smoker    Smokeless tobacco: Never Used    Alcohol use Yes      Comment: social    Drug use: No    Sexual activity: Yes     Partners: Female     Other Topics Concern    Not on file     Social History Narrative    No narrative on file     Past Surgical History:   Procedure Laterality Date     "COLONOSCOPY      5yrs ago     Patient Active Problem List   Diagnosis    Hypertension    Hyperlipidemia    Type II or unspecified type diabetes mellitus without mention of complication, not stated as uncontrolled    Family history of prostate cancer    Prostate cancer     Review of Systems   Constitutional: Negative for appetite change, chills, fatigue, fever and unexpected weight change.   HENT: Negative for nosebleeds.    Respiratory: Negative for shortness of breath and wheezing.    Cardiovascular: Negative for chest pain, palpitations and leg swelling.   Gastrointestinal: Negative for abdominal distention, abdominal pain, constipation, diarrhea, nausea and vomiting.   Genitourinary: Negative for dysuria and hematuria.   Musculoskeletal: Negative for arthralgias and back pain.   Skin: Negative for pallor.   Neurological: Negative for dizziness, seizures and syncope.   Hematological: Negative for adenopathy.   Psychiatric/Behavioral: Negative for dysphoric mood.         Objective:      There were no vitals taken for this visit.  Estimated body mass index is 31.98 kg/m² as calculated from the following:    Height as of 3/16/17: 5' 8" (1.727 m).    Weight as of 3/16/17: 95.4 kg (210 lb 5.1 oz).  Physical Exam   Constitutional: He is oriented to person, place, and time. He appears well-developed and well-nourished. No distress.   HENT:   Head: Atraumatic.   Neck: No tracheal deviation present.   Cardiovascular: Normal rate.    Pulmonary/Chest: Effort normal. No respiratory distress. He has no wheezes.   Abdominal: Soft. Bowel sounds are normal. He exhibits no distension and no mass. There is no tenderness. There is no rebound and no guarding.   Neurological: He is alert and oriented to person, place, and time.   Skin: Skin is warm and dry. He is not diaphoretic.     Psychiatric: He has a normal mood and affect. His behavior is normal. Judgment and thought content normal.         Assessment and Plan:         "   Problem List Items Addressed This Visit     Prostate cancer - Primary      Other Visit Diagnoses    None.         Follow up:       Champ Chavez

## 2017-07-21 ENCOUNTER — TELEPHONE (OUTPATIENT)
Dept: INTERNAL MEDICINE | Facility: CLINIC | Age: 62
End: 2017-07-21

## 2017-09-08 DIAGNOSIS — I10 ESSENTIAL HYPERTENSION: ICD-10-CM

## 2017-09-11 RX ORDER — LOSARTAN POTASSIUM 50 MG/1
TABLET ORAL
Qty: 90 TABLET | Refills: 3 | Status: SHIPPED | OUTPATIENT
Start: 2017-09-11 | End: 2018-09-20 | Stop reason: SDUPTHER

## 2017-09-15 RX ORDER — METFORMIN HYDROCHLORIDE 1000 MG/1
TABLET ORAL
Qty: 180 TABLET | Refills: 3 | Status: SHIPPED | OUTPATIENT
Start: 2017-09-15 | End: 2018-09-20 | Stop reason: SDUPTHER

## 2018-01-13 ENCOUNTER — LAB VISIT (OUTPATIENT)
Dept: LAB | Facility: HOSPITAL | Age: 63
End: 2018-01-13
Attending: UROLOGY
Payer: COMMERCIAL

## 2018-01-13 DIAGNOSIS — C61 PROSTATE CANCER: ICD-10-CM

## 2018-01-13 LAB — COMPLEXED PSA SERPL-MCNC: 0.02 NG/ML

## 2018-01-13 PROCEDURE — 84153 ASSAY OF PSA TOTAL: CPT

## 2018-01-13 PROCEDURE — 36415 COLL VENOUS BLD VENIPUNCTURE: CPT | Mod: PO

## 2018-01-15 ENCOUNTER — TELEPHONE (OUTPATIENT)
Dept: UROLOGY | Facility: CLINIC | Age: 63
End: 2018-01-15

## 2018-01-16 ENCOUNTER — OFFICE VISIT (OUTPATIENT)
Dept: UROLOGY | Facility: CLINIC | Age: 63
End: 2018-01-16
Payer: COMMERCIAL

## 2018-01-16 VITALS
HEART RATE: 80 BPM | SYSTOLIC BLOOD PRESSURE: 155 MMHG | HEIGHT: 68 IN | DIASTOLIC BLOOD PRESSURE: 75 MMHG | WEIGHT: 222.69 LBS | BODY MASS INDEX: 33.75 KG/M2

## 2018-01-16 DIAGNOSIS — C61 PROSTATE CANCER: Primary | ICD-10-CM

## 2018-01-16 DIAGNOSIS — N52.9 ED (ERECTILE DYSFUNCTION) OF ORGANIC ORIGIN: ICD-10-CM

## 2018-01-16 PROCEDURE — 99999 PR PBB SHADOW E&M-EST. PATIENT-LVL III: CPT | Mod: PBBFAC,,, | Performed by: UROLOGY

## 2018-01-16 PROCEDURE — 99214 OFFICE O/P EST MOD 30 MIN: CPT | Mod: S$GLB,,, | Performed by: UROLOGY

## 2018-01-16 NOTE — PROGRESS NOTES
Clinic Note  1/16/2018      Subjective:         Chief Complaint:   HPI  Sanchez Purcell is a 62 y.o. male s/p robotic assisted laparoscopic prostatectomy on 2/10/17.  Pad free, continent. Doing kegels.  Doing penile rehab, getting partials erections.  They do line dancing once a week.      Lab Results   Component Value Date    PSA 7.2 (H) 07/29/2016    PSA 5.5 (H) 04/29/2015    PSA 2.8 09/26/2011    PSA 2.0 05/24/2010    PSADIAG 0.02 01/13/2018    PSADIAG <0.01 07/17/2017    PSADIAG 0.01 03/14/2017      Past Medical History:   Diagnosis Date    Diabetes mellitus     Diabetes mellitus type II     Heart murmur 1990's    Hyperlipidemia     Hypertension     Prostate cancer      Family History   Problem Relation Age of Onset    Diabetes Mother     Cancer Sister      breast    Cancer Brother      colon    Cancer Sister      abd cancer    No Known Problems Father     No Known Problems Maternal Aunt     No Known Problems Maternal Uncle     No Known Problems Paternal Aunt     No Known Problems Paternal Uncle     No Known Problems Maternal Grandmother     No Known Problems Maternal Grandfather     No Known Problems Paternal Grandmother     No Known Problems Paternal Grandfather     Amblyopia Neg Hx     Blindness Neg Hx     Cataracts Neg Hx     Glaucoma Neg Hx     Hypertension Neg Hx     Macular degeneration Neg Hx     Retinal detachment Neg Hx     Strabismus Neg Hx     Stroke Neg Hx     Thyroid disease Neg Hx      Social History     Social History    Marital status:      Spouse name: N/A    Number of children: N/A    Years of education: N/A     Occupational History    Not on file.     Social History Main Topics    Smoking status: Never Smoker    Smokeless tobacco: Never Used    Alcohol use Yes      Comment: social    Drug use: No    Sexual activity: Yes     Partners: Female     Other Topics Concern    Not on file     Social History Narrative    No narrative on file     Past  "Surgical History:   Procedure Laterality Date    COLONOSCOPY      5yrs ago    PROSTATE SURGERY       Patient Active Problem List   Diagnosis    Hypertension    Hyperlipidemia    Type II or unspecified type diabetes mellitus without mention of complication, not stated as uncontrolled    Family history of prostate cancer    Prostate cancer     Review of Systems   Constitutional: Negative for appetite change, chills, fatigue, fever and unexpected weight change.   HENT: Negative for nosebleeds.    Respiratory: Negative for shortness of breath and wheezing.    Cardiovascular: Negative for chest pain, palpitations and leg swelling.   Gastrointestinal: Negative for abdominal distention, abdominal pain, constipation, diarrhea, nausea and vomiting.   Genitourinary: Negative for dysuria and hematuria.   Musculoskeletal: Negative for arthralgias and back pain.   Skin: Negative for pallor.   Neurological: Negative for dizziness, seizures and syncope.   Hematological: Negative for adenopathy.   Psychiatric/Behavioral: Negative for dysphoric mood.         Objective:      BP (!) 155/75 (BP Location: Left arm, Patient Position: Sitting, BP Method: Medium (Automatic))   Pulse 80   Ht 5' 8" (1.727 m)   Wt 101 kg (222 lb 10.6 oz)   BMI 33.86 kg/m²   Estimated body mass index is 33.86 kg/m² as calculated from the following:    Height as of this encounter: 5' 8" (1.727 m).    Weight as of this encounter: 101 kg (222 lb 10.6 oz).  Physical Exam      Assessment and Plan:           Problem List Items Addressed This Visit     Prostate cancer - Primary          Follow up:   Patient interested in PEP.  6 months with PSA.    Champ Chavez        "

## 2018-01-25 ENCOUNTER — OFFICE VISIT (OUTPATIENT)
Dept: UROLOGY | Facility: CLINIC | Age: 63
End: 2018-01-25
Payer: COMMERCIAL

## 2018-01-25 VITALS
SYSTOLIC BLOOD PRESSURE: 143 MMHG | WEIGHT: 224 LBS | HEIGHT: 68 IN | DIASTOLIC BLOOD PRESSURE: 70 MMHG | HEART RATE: 74 BPM | BODY MASS INDEX: 33.95 KG/M2

## 2018-01-25 DIAGNOSIS — Z90.79 HISTORY OF RADICAL PROSTATECTOMY: ICD-10-CM

## 2018-01-25 DIAGNOSIS — N52.31 ERECTILE DYSFUNCTION AFTER RADICAL PROSTATECTOMY: ICD-10-CM

## 2018-01-25 DIAGNOSIS — C61 PROSTATE CANCER: Primary | ICD-10-CM

## 2018-01-25 PROCEDURE — 99214 OFFICE O/P EST MOD 30 MIN: CPT | Mod: S$GLB,,, | Performed by: NURSE PRACTITIONER

## 2018-01-25 PROCEDURE — 99999 PR PBB SHADOW E&M-EST. PATIENT-LVL IV: CPT | Mod: PBBFAC,,, | Performed by: NURSE PRACTITIONER

## 2018-01-25 RX ORDER — PAPAVERINE HYDROCHLORIDE 30 MG/ML
INJECTION INTRAMUSCULAR; INTRAVENOUS
Qty: 5 ML | Refills: 0 | Status: SHIPPED | OUTPATIENT
Start: 2018-01-25 | End: 2020-03-06

## 2018-01-25 NOTE — PATIENT INSTRUCTIONS
Understanding Erectile Dysfunction    Erectile dysfunction (ED) is a problem getting an erection firm enough or keeping it long enough for intercourse. The problem can happen to any man at any age. But health problems that can lead to ED become more common as a man ages. Up to half of men over age 40 experience ED at some point.  Causes of ED  ED can have many causes. Most are physical. Some are emotional issues. Often, a combination of causes is involved. Causes of ED may include:  · Medical conditions such as diabetes or depression  · Smoking tobacco or marijuana  · Drinking too much alcohol  · Side effects of medications  · Injury to nerves or blood vessels  · Emotional issues such as stress or relationship problems  ED can be treated  Prescription medications for ED are available. They help many men who try them. Depending upon the cause of the ED, though, medications may not be enough. In these cases, other treatment options are available. These include erectile aids and surgery. Your health care provider can tell you more about the treatment that is right for you. And new treatments for ED are being studied. No matter what the treatment you decide on, stay in touch with your doctor. If your symptoms persist, he or she may be able to adjust your current treatment or try something new.  Date Last Reviewed: 1/1/2017  © 2448-7483 [employer]. [address] [place] [address], [place] [address]. All rights reserved. This information is not intended as a substitute for professional medical care. Always follow your healthcare professional's instructions.        Penile Self-Injection: Notes and Precautions     Man and healthcare provider sitting across from one another, talking.   Penile self-injection is a simple technique that may improve your sex life. Some men even find that self-injection leads to an increase in natural erections. If you have questions or concerns about self-injection or erectile  dysfunction (ED), talk with your healthcare provider. The information on this sheet will help you get the best results.  Notes about penile self-injection  · You may feel a mild burning during injection. This is OK. But if you feel pressure or severe pain, stop the injection. There may be a problem with the injection site.  · Only inject the medicine on the side of your penis. It may not work if injected elsewhere.  · To prevent scarring, inject in a different spot each time.  · Dont use this treatment if you have a bleeding disorder or any risk of infection.  · Get medical help right away if your erection lasts longer than 3 to 4 hours.  Work with your healthcare provider  Ask how often you can safely repeat injections, as well as any other questions you have. You and your healthcare provider will talk about follow-up exams and how to get supplies. If the medicine doesnt work or stops working over time, tell your healthcare provider.     When to call your healthcare provider  Call your healthcare provider right away if any of these occur:  · An erection that lasts longer than 3 to 4  hours  · Bleeding or bruising  · Severe pain  · Scarring or curvature of the penis   Date Last Reviewed: 1/1/2017  © 8671-7879 [employer]. [address] [place] [address]. All rights reserved. This information is not intended as a substitute for professional medical care. Always follow your healthcare professional's instructions.        Penile Self-Injection Procedure  Self-injection is a good option if you have erectile dysfunction (ED). You insert a tiny needle into your penis and inject a medicine. This helps your penis get hard and stay that way long enough for sex. And sex and orgasm will feel as good as always. You may be nervous about doing self-injection at first. But with practice, it will get easier. Your healthcare provider will show you how to do self-injection the first time.  Talk to your doctor  about any medicines you take and any medical problems you have.      Preparing for injection  · Wash your hands well with soap and water.  · Prepare the medicine (if needed).  · Sit or  a comfortable position in a warm, well-lit room. If you need to, sit or  front of a mirror.  · Find an injection site on one side of your penis, in a place with no visible veins. (Dont inject into the top, bottom, or head of the penis.)  · Clean the injection site with an alcohol swab. Grasp the head of your penis firmly with your thumb and forefinger (dont just pinch the skin). Stretch the penis so the skin on the shaft is taut.  Injecting the medicine  · Rest your penis against your inner thigh and pull it gently toward your knee. Dont twist or rotate it. This way youll be sure to inject the medicine into the spot you chose and cleaned before.  · Hold the syringe between your thumb and fingers, like youre holding a pen. Rest your forearm on your thigh for support.  · Insert the needle at a 90° angle (perpendicular) to the shaft. Do this quickly to reduce discomfort. (The needle should go in easily. If it doesnt, stop right away.)  · Move your thumb to the plunger. Press down to inject the medicine, counting to 5.  · Remove the needle and dispose of it safely.  Gaining an erection  · Apply pressure to the injection site for a few minutes. This prevents swelling and bruising and helps spread the medicine.   · Stand up. This may help your erection develop. Foreplay often helps, too.  · Your penis should become firm within 10 to 20 minutes. The erection will last long enough for sex, and maybe longer.  When to seek medical care  An erection that lasts longer than 3 to 4  hours  · Bleeding or bruising  · Severe pain  · Scarring or curvature of the penis   Date Last Reviewed: 1/7/2017  © 4131-3459 [employer]. [address] [place] [address], [place] [address]. All rights reserved. This information is not  intended as a substitute for professional medical care. Always follow your healthcare professional's instructions.

## 2018-01-25 NOTE — PROGRESS NOTES
Subjective:       Patient ID: Sanchez Purcell is a 62 y.o. male.    Chief Complaint: Follow-up    Sanchez Purcell is a 62 y.o. Male with history of Prostate Cancer; s/p RALP 02/10/2017  [place] 7 (3+4); (-) extension, SV, but (+) apex margin; pT2c, pN0, pMx         PSA                  0.02                01/13/2018                 PSA                  <0.01               07/17/2017                 PSA                  0.01                03/14/2017                  PSA                      7.2 (H)             07/29/2016                 PSA                      5.5 (H)             04/29/2015                 PSA                      2.8                 09/26/2011                 PSA                      2.0                 05/24/2010                         Last clinic visit 01/16/2018  He has good control of bladder but erections are not back to normal  He denies ED prior to surgery.  He was doing penile rehab with Cialis but only achieving partial erections with some temporary discomfort to pelvic area with orgasm.   He is interested in ICI therapy.  He did not bring any medication with him.            Past Medical History:  No date: Diabetes mellitus  No date: Diabetes mellitus type II  1990's: Heart murmur  No date: Hyperlipidemia  No date: Hypertension  No date: Prostate cancer    Past Surgical History:  No date: COLONOSCOPY      Comment: 5yrs ago  No date: PROSTATE SURGERY    Review of patient's family history indicates:    Diabetes                       Mother                    Cancer                         Sister                      Comment: breast    Cancer                         Brother                     Comment: colon    Cancer                         Sister                      Comment: abd cancer    No Known Problems              Father                    No Known Problems              Maternal Aunt             No Known Problems              Maternal Uncle            No Known Problems               Paternal Aunt             No Known Problems              Paternal Uncle            No Known Problems              Maternal Grandmother      No Known Problems              Maternal Grandfather      No Known Problems              Paternal Grandmother      No Known Problems              Paternal Grandfather      Amblyopia                      Neg Hx                    Blindness                      Neg Hx                    Cataracts                      Neg Hx                    Glaucoma                       Neg Hx                    Hypertension                   Neg Hx                    Macular degeneration           Neg Hx                    Retinal detachment             Neg Hx                    Strabismus                     Neg Hx                    Stroke                         Neg Hx                    Thyroid disease                Neg Hx                      Social History    Marital status:              Spouse name:                       Years of education:                 Number of children:               Occupational History    None on file    Social History Main Topics    Smoking status: Never Smoker                                                                Smokeless tobacco: Never Used                        Alcohol use: Yes                Comment: social    Drug use: No              Sexual activity: Yes               Partners with: Female    Other Topics            Concern    None on file    Social History Narrative    None on file        Allergies:  No known drug allergies    Medications:  Current Outpatient Prescriptions:   blood sugar diagnostic Strp, Free style lite, Disp: , Rfl:   CIALIS 20 mg Tab, , Disp: , Rfl:   losartan (COZAAR) 50 MG tablet, TAKE 1 TABLET(50 MG) BY MOUTH EVERY DAY, Disp: 90 tablet, Rfl: 3  metformin (GLUCOPHAGE) 1000 MG tablet, TAKE 1 TABLET BY MOUTH EVERY 12 HOURS, Disp: 180 tablet, Rfl: 3    ULTRA THIN LANCETS 31 gauge Misc, , Disp: , Rfl:                Review of Systems   Constitutional: Negative for activity change, appetite change, chills and fever.   HENT: Negative for facial swelling and trouble swallowing.    Eyes: Negative for visual disturbance.   Respiratory: Negative for chest tightness and shortness of breath.    Cardiovascular: Negative for chest pain and palpitations.   Gastrointestinal: Negative.  Negative for abdominal pain, constipation, diarrhea, nausea and vomiting.   Genitourinary: Negative for difficulty urinating, dysuria, flank pain, hematuria, penile pain, penile swelling, scrotal swelling and testicular pain.        He is ok with how he urinates.     Musculoskeletal: Negative for back pain, gait problem, myalgias and neck stiffness.   Skin: Negative for rash.   Neurological: Negative for dizziness and speech difficulty.   Hematological: Does not bruise/bleed easily.   Psychiatric/Behavioral: Negative for behavioral problems.       Objective:      Physical Exam   Nursing note and vitals reviewed.  Constitutional: He is oriented to person, place, and time. Vital signs are normal. He appears well-developed and well-nourished. He is active and cooperative.  Non-toxic appearance. He does not have a sickly appearance.   HENT:   Head: Normocephalic and atraumatic.   Right Ear: External ear normal.   Left Ear: External ear normal.   Nose: Nose normal.   Mouth/Throat: Mucous membranes are normal.   Eyes: Conjunctivae and lids are normal. No scleral icterus.   Neck: Trachea normal, normal range of motion and full passive range of motion without pain. Neck supple. No JVD present. No tracheal deviation present.   Cardiovascular: Normal rate, S1 normal and S2 normal.    Pulmonary/Chest: Effort normal. No respiratory distress. He exhibits no tenderness.   Abdominal: Soft. Normal appearance and bowel sounds are normal. There is no hepatosplenomegaly. There is no tenderness. There is no CVA tenderness.   Genitourinary: Rectum normal, testes  normal and penis normal. No penile tenderness.       Musculoskeletal: Normal range of motion.   Neurological: He is alert and oriented to person, place, and time. He has normal strength.   Skin: Skin is warm, dry and intact.     Psychiatric: He has a normal mood and affect. His behavior is normal. Judgment and thought content normal.       Assessment:       1. Prostate cancer    2. Erectile dysfunction after radical prostatectomy    3. History of radical prostatectomy        Plan:         I spent 25 minutes with the patient of which more than half was spent in direct consultation with the patient in regards to our treatment and plan.    Education and recommendations of today's plan of care including home remedies.  We discussed ED and the contributing factors. We reviewed his personal factors that contribute to ED. Patient was educated on ED treatments. We discussed PDE-5 inhibitors, DEVANTE, Muse, and IPP.    He is here today to discusse the Intracorporal injection/ICI education and first injection.  Educational materials were supplied.    ICI is an injectable medication that consists of three different medications to produce an erection. It is known as a Trimix Compound or PEP. The medication is a compound medication and is only mixed up at certain pharmacies known as a compound pharmacy. The medication has to be stored in the refrigerator. Improper storage decreases the effectiveness of the medication.     He was educated that it is an injection. With any injection there is risk for possible pain at the injection site as well as risk for an infection. Clean technique must be followed to help prevent infection. Proper needle disposable is necessary. He voices understanding.    The injection is best given when standing up and the penis is positioned perpendicular to the body. The urethral opening should be facing away from the body. Injection is always given on the lateral or side of the penis. Never give the injection  to the top of the penis to avoid possible trauma to arteries and veins. Never give the injection to the bottom of the penis to avoid trauma to the urethra. He should alternate the injection sites to avoid the build up of scar tissue due to multiple injections.    This medication can increase the risk of erections lasting longer than 4 hours. This is known as a priapism and is a medical emergency. This requires immediate medical attention. This is main reason we give the first dose in the office today. We start at low dose and see how well the patients reacts. We can increase the medication if needed depending on the reaction the patient receives today. We discussed the fine line between enough medication for penetration and too much leading to a priapism. He voices understanding.    I gave him Rx for test dose; he will call when he fills it

## 2018-07-13 ENCOUNTER — LAB VISIT (OUTPATIENT)
Dept: LAB | Facility: HOSPITAL | Age: 63
End: 2018-07-13
Attending: UROLOGY
Payer: COMMERCIAL

## 2018-07-13 DIAGNOSIS — C61 PROSTATE CANCER: ICD-10-CM

## 2018-07-13 LAB — COMPLEXED PSA SERPL-MCNC: 0.04 NG/ML

## 2018-07-13 PROCEDURE — 36415 COLL VENOUS BLD VENIPUNCTURE: CPT | Mod: PO

## 2018-07-13 PROCEDURE — 84153 ASSAY OF PSA TOTAL: CPT

## 2018-07-17 ENCOUNTER — OFFICE VISIT (OUTPATIENT)
Dept: UROLOGY | Facility: CLINIC | Age: 63
End: 2018-07-17
Payer: COMMERCIAL

## 2018-07-17 VITALS
HEART RATE: 74 BPM | DIASTOLIC BLOOD PRESSURE: 82 MMHG | RESPIRATION RATE: 15 BRPM | BODY MASS INDEX: 33.34 KG/M2 | HEIGHT: 68 IN | SYSTOLIC BLOOD PRESSURE: 137 MMHG | WEIGHT: 220 LBS

## 2018-07-17 DIAGNOSIS — C61 PROSTATE CANCER: Primary | ICD-10-CM

## 2018-07-17 PROCEDURE — 99999 PR PBB SHADOW E&M-EST. PATIENT-LVL III: CPT | Mod: PBBFAC,,, | Performed by: UROLOGY

## 2018-07-17 PROCEDURE — 99214 OFFICE O/P EST MOD 30 MIN: CPT | Mod: S$GLB,,, | Performed by: UROLOGY

## 2018-07-17 NOTE — PROGRESS NOTES
Clinic Note  7/17/2018      Subjective:         Chief Complaint:   HPI  Sanchez Purcell is a 62 y.o. male s/p RALP 02/10/2017  [place] 7 (3+4); (-) extension, SV, but (+) apex margin; pT2c, pN0, pMx.  Positive family history.  Trained with PEP but hasn't used it. Worried it will cause cancer to come back . Cialis doesn't help.         Lab Results   Component Value Date    PSA 7.2 (H) 07/29/2016    PSA 5.5 (H) 04/29/2015    PSA 2.8 09/26/2011    PSA 2.0 05/24/2010    PSADIAG 0.04 07/13/2018    PSADIAG 0.02 01/13/2018    PSADIAG <0.01 07/17/2017    PSADIAG 0.01 03/14/2017      Past Medical History:   Diagnosis Date    Diabetes mellitus     Diabetes mellitus type II     Heart murmur 1990's    Hyperlipidemia     Hypertension     Prostate cancer      Family History   Problem Relation Age of Onset    Diabetes Mother     Cancer Sister         breast    Cancer Brother         colon    Cancer Sister         abd cancer    No Known Problems Father     No Known Problems Maternal Aunt     No Known Problems Maternal Uncle     No Known Problems Paternal Aunt     No Known Problems Paternal Uncle     No Known Problems Maternal Grandmother     No Known Problems Maternal Grandfather     No Known Problems Paternal Grandmother     No Known Problems Paternal Grandfather     Amblyopia Neg Hx     Blindness Neg Hx     Cataracts Neg Hx     Glaucoma Neg Hx     Hypertension Neg Hx     Macular degeneration Neg Hx     Retinal detachment Neg Hx     Strabismus Neg Hx     Stroke Neg Hx     Thyroid disease Neg Hx      Social History     Social History    Marital status:      Spouse name: N/A    Number of children: N/A    Years of education: N/A     Occupational History    Not on file.     Social History Main Topics    Smoking status: Never Smoker    Smokeless tobacco: Never Used    Alcohol use Yes      Comment: social    Drug use: No    Sexual activity: Yes     Partners: Female     Other Topics Concern  "   Not on file     Social History Narrative    No narrative on file     Past Surgical History:   Procedure Laterality Date    COLONOSCOPY      5yrs ago    PROSTATE SURGERY       Patient Active Problem List   Diagnosis    Hypertension    Hyperlipidemia    Type II or unspecified type diabetes mellitus without mention of complication, not stated as uncontrolled    Family history of prostate cancer    Prostate cancer     Review of Systems   Constitutional: Negative for appetite change, chills, fatigue, fever and unexpected weight change.   HENT: Negative for nosebleeds.    Respiratory: Negative for shortness of breath and wheezing.    Cardiovascular: Negative for chest pain, palpitations and leg swelling.   Gastrointestinal: Negative for abdominal distention, abdominal pain, constipation, diarrhea, nausea and vomiting.   Genitourinary: Negative for dysuria and hematuria.   Musculoskeletal: Negative for arthralgias and back pain.   Skin: Negative for pallor.   Neurological: Negative for dizziness, seizures and syncope.   Hematological: Negative for adenopathy.   Psychiatric/Behavioral: Negative for dysphoric mood.         Objective:      /82   Pulse 74   Resp 15   Ht 5' 8" (1.727 m)   Wt 99.8 kg (220 lb)   BMI 33.45 kg/m²   Estimated body mass index is 33.45 kg/m² as calculated from the following:    Height as of this encounter: 5' 8" (1.727 m).    Weight as of this encounter: 99.8 kg (220 lb).  Physical Exam      Assessment and Plan:           Problem List Items Addressed This Visit     Prostate cancer - Primary          Follow up:   Reassured about PEP. Discussed other options- DEVANTE, Muse, inflatable penile prosthesis.  Will have another training visit with Jolie.  6 months with PSA.    Champ Chavez"

## 2018-09-20 DIAGNOSIS — I10 ESSENTIAL HYPERTENSION: ICD-10-CM

## 2018-09-20 RX ORDER — LOSARTAN POTASSIUM 50 MG/1
TABLET ORAL
Qty: 90 TABLET | Refills: 0 | Status: SHIPPED | OUTPATIENT
Start: 2018-09-20 | End: 2018-12-15 | Stop reason: SDUPTHER

## 2018-09-20 RX ORDER — METFORMIN HYDROCHLORIDE 1000 MG/1
TABLET ORAL
Qty: 180 TABLET | Refills: 0 | Status: SHIPPED | OUTPATIENT
Start: 2018-09-20 | End: 2018-12-15 | Stop reason: SDUPTHER

## 2018-12-15 DIAGNOSIS — I10 ESSENTIAL HYPERTENSION: ICD-10-CM

## 2018-12-17 RX ORDER — METFORMIN HYDROCHLORIDE 1000 MG/1
TABLET ORAL
Qty: 180 TABLET | Refills: 3 | Status: SHIPPED | OUTPATIENT
Start: 2018-12-17 | End: 2020-01-09 | Stop reason: SDUPTHER

## 2018-12-17 RX ORDER — LOSARTAN POTASSIUM 50 MG/1
TABLET ORAL
Qty: 90 TABLET | Refills: 3 | Status: SHIPPED | OUTPATIENT
Start: 2018-12-17 | End: 2020-01-09 | Stop reason: SDUPTHER

## 2019-01-10 ENCOUNTER — LAB VISIT (OUTPATIENT)
Dept: LAB | Facility: HOSPITAL | Age: 64
End: 2019-01-10
Attending: UROLOGY
Payer: COMMERCIAL

## 2019-01-10 DIAGNOSIS — C61 PROSTATE CANCER: ICD-10-CM

## 2019-01-10 LAB — COMPLEXED PSA SERPL-MCNC: 0.06 NG/ML

## 2019-01-10 PROCEDURE — 36415 COLL VENOUS BLD VENIPUNCTURE: CPT | Mod: PO

## 2019-01-10 PROCEDURE — 84153 ASSAY OF PSA TOTAL: CPT

## 2019-01-17 ENCOUNTER — OFFICE VISIT (OUTPATIENT)
Dept: UROLOGY | Facility: CLINIC | Age: 64
End: 2019-01-17
Payer: COMMERCIAL

## 2019-01-17 VITALS
HEIGHT: 68 IN | BODY MASS INDEX: 33.34 KG/M2 | WEIGHT: 220 LBS | HEART RATE: 72 BPM | RESPIRATION RATE: 15 BRPM | SYSTOLIC BLOOD PRESSURE: 136 MMHG | DIASTOLIC BLOOD PRESSURE: 74 MMHG

## 2019-01-17 DIAGNOSIS — C61 PROSTATE CANCER: Primary | ICD-10-CM

## 2019-01-17 PROCEDURE — 99214 PR OFFICE/OUTPT VISIT, EST, LEVL IV, 30-39 MIN: ICD-10-PCS | Mod: S$GLB,,, | Performed by: UROLOGY

## 2019-01-17 PROCEDURE — 99999 PR PBB SHADOW E&M-EST. PATIENT-LVL III: ICD-10-PCS | Mod: PBBFAC,,, | Performed by: UROLOGY

## 2019-01-17 PROCEDURE — 99999 PR PBB SHADOW E&M-EST. PATIENT-LVL III: CPT | Mod: PBBFAC,,, | Performed by: UROLOGY

## 2019-01-17 PROCEDURE — 99214 OFFICE O/P EST MOD 30 MIN: CPT | Mod: S$GLB,,, | Performed by: UROLOGY

## 2019-01-17 NOTE — PROGRESS NOTES
Clinic Note  1/17/2019      Subjective:         Chief Complaint:   SAMI Purcell is a 63 y.o. male  s/p RALP 02/10/2017  [place] 7 (3+4); (-) extension, SV, but (+) apex margin; pT2c, pN0, pMx. Perfect continence.  Positive family history.  Trained with PEP but hasn't used it. Worried it will cause cancer to come back . Cialis doesn't help.      Lab Results   Component Value Date    PSA 7.2 (H) 07/29/2016    PSA 5.5 (H) 04/29/2015    PSA 2.8 09/26/2011    PSA 2.0 05/24/2010    PSADIAG 0.06 01/10/2019    PSADIAG 0.04 07/13/2018    PSADIAG 0.02 01/13/2018    PSADIAG <0.01 07/17/2017    PSADIAG 0.01 03/14/2017      Past Medical History:   Diagnosis Date    Diabetes mellitus     Diabetes mellitus type II     Heart murmur 1990's    Hyperlipidemia     Hypertension     Prostate cancer      Family History   Problem Relation Age of Onset    Diabetes Mother     Cancer Sister         breast    Cancer Brother         colon    Cancer Sister         abd cancer    No Known Problems Father     No Known Problems Maternal Aunt     No Known Problems Maternal Uncle     No Known Problems Paternal Aunt     No Known Problems Paternal Uncle     No Known Problems Maternal Grandmother     No Known Problems Maternal Grandfather     No Known Problems Paternal Grandmother     No Known Problems Paternal Grandfather     Amblyopia Neg Hx     Blindness Neg Hx     Cataracts Neg Hx     Glaucoma Neg Hx     Hypertension Neg Hx     Macular degeneration Neg Hx     Retinal detachment Neg Hx     Strabismus Neg Hx     Stroke Neg Hx     Thyroid disease Neg Hx      Social History     Socioeconomic History    Marital status:      Spouse name: Not on file    Number of children: Not on file    Years of education: Not on file    Highest education level: Not on file   Social Needs    Financial resource strain: Not on file    Food insecurity - worry: Not on file    Food insecurity - inability: Not on file     "Transportation needs - medical: Not on file    Transportation needs - non-medical: Not on file   Occupational History    Not on file   Tobacco Use    Smoking status: Never Smoker    Smokeless tobacco: Never Used   Substance and Sexual Activity    Alcohol use: Yes     Comment: social    Drug use: No    Sexual activity: Yes     Partners: Female   Other Topics Concern    Not on file   Social History Narrative    Not on file     Past Surgical History:   Procedure Laterality Date    COLONOSCOPY      5yrs ago    COLONOSCOPY N/A 7/8/2015    Performed by LORAINE Ramon MD at [facility] ENDO (4TH FLR)    PROSTATE SURGERY      ROBOTIC ASSISTED LAPAROSCOPIC PROSTATECTOMY N/A 2/10/2017    Performed by Champ Chavez MD at [facility] OR 2ND FLR     Patient Active Problem List   Diagnosis    Hypertension    Hyperlipidemia    Type II or unspecified type diabetes mellitus without mention of complication, not stated as uncontrolled    Family history of prostate cancer    Prostate cancer     Review of Systems   Constitutional: Negative for appetite change, chills, fatigue, fever and unexpected weight change.   HENT: Negative for nosebleeds.    Respiratory: Negative for shortness of breath and wheezing.    Cardiovascular: Negative for chest pain, palpitations and leg swelling.   Gastrointestinal: Negative for abdominal distention, abdominal pain, constipation, diarrhea, nausea and vomiting.   Genitourinary: Positive for urgency. Negative for dysuria and hematuria.   Musculoskeletal: Negative for arthralgias and back pain.   Skin: Negative for pallor.   Neurological: Negative for dizziness, seizures and syncope.   Hematological: Negative for adenopathy.   Psychiatric/Behavioral: Negative for dysphoric mood.         Objective:      There were no vitals taken for this visit.  Estimated body mass index is 33.45 kg/m² as calculated from the following:    Height as of 7/17/18: 5' 8" (1.727 m).    Weight as of 7/17/18: 99.8 kg " (220 lb).  Physical Exam   Constitutional: He is oriented to person, place, and time. He appears well-developed and well-nourished. No distress.   HENT:   Head: Atraumatic.   Neck: No tracheal deviation present.   Cardiovascular: Normal rate.    Pulmonary/Chest: Effort normal. No respiratory distress. He has no wheezes.   Abdominal: Soft. Bowel sounds are normal. He exhibits no distension and no mass. There is no tenderness. There is no rebound and no guarding.   Neurological: He is alert and oriented to person, place, and time.   Skin: Skin is warm and dry. He is not diaphoretic.     Psychiatric: He has a normal mood and affect. His behavior is normal. Judgment and thought content normal.         Assessment and Plan:           Problem List Items Addressed This Visit     Prostate cancer - Primary          Follow up:   6 months with PSA.    Champ Chavez

## 2019-07-26 ENCOUNTER — LAB VISIT (OUTPATIENT)
Dept: LAB | Facility: HOSPITAL | Age: 64
End: 2019-07-26
Attending: UROLOGY
Payer: COMMERCIAL

## 2019-07-26 DIAGNOSIS — C61 PROSTATE CANCER: ICD-10-CM

## 2019-07-26 LAB — COMPLEXED PSA SERPL-MCNC: 0.06 NG/ML (ref 0–4)

## 2019-07-26 PROCEDURE — 36415 COLL VENOUS BLD VENIPUNCTURE: CPT | Mod: PO

## 2019-07-26 PROCEDURE — 84153 ASSAY OF PSA TOTAL: CPT

## 2019-07-30 ENCOUNTER — OFFICE VISIT (OUTPATIENT)
Dept: UROLOGY | Facility: CLINIC | Age: 64
End: 2019-07-30
Payer: COMMERCIAL

## 2019-07-30 VITALS
BODY MASS INDEX: 33.34 KG/M2 | DIASTOLIC BLOOD PRESSURE: 79 MMHG | WEIGHT: 220 LBS | HEIGHT: 68 IN | HEART RATE: 71 BPM | RESPIRATION RATE: 15 BRPM | SYSTOLIC BLOOD PRESSURE: 139 MMHG

## 2019-07-30 DIAGNOSIS — C61 PROSTATE CANCER: Primary | ICD-10-CM

## 2019-07-30 DIAGNOSIS — N52.9 ED (ERECTILE DYSFUNCTION) OF ORGANIC ORIGIN: ICD-10-CM

## 2019-07-30 PROCEDURE — 99999 PR PBB SHADOW E&M-EST. PATIENT-LVL III: CPT | Mod: PBBFAC,,, | Performed by: UROLOGY

## 2019-07-30 PROCEDURE — 99214 OFFICE O/P EST MOD 30 MIN: CPT | Mod: S$GLB,,, | Performed by: UROLOGY

## 2019-07-30 PROCEDURE — 99214 PR OFFICE/OUTPT VISIT, EST, LEVL IV, 30-39 MIN: ICD-10-PCS | Mod: S$GLB,,, | Performed by: UROLOGY

## 2019-07-30 PROCEDURE — 99999 PR PBB SHADOW E&M-EST. PATIENT-LVL III: ICD-10-PCS | Mod: PBBFAC,,, | Performed by: UROLOGY

## 2019-07-30 NOTE — PROGRESS NOTES
Clinic Note  7/30/2019      Subjective:         Chief Complaint:   SAMI Purcell is a 63 y.o. male s/p RALP 02/10/2017  [place] 7 (3+4); (-) extension, SV, but (+) apex margin; pT2c, pN0, pMx. Perfect continence.  Positive family history.  Trained with PEP but hasn't used it. Worried it will cause cancer to come back . Cialis doesn't help.         Lab Results   Component Value Date    PSA 7.2 (H) 07/29/2016    PSA 5.5 (H) 04/29/2015    PSA 2.8 09/26/2011    PSA 2.0 05/24/2010    PSADIAG 0.06 07/26/2019    PSADIAG 0.06 01/10/2019    PSADIAG 0.04 07/13/2018    PSADIAG 0.02 01/13/2018    PSADIAG <0.01 07/17/2017    PSADIAG 0.01 03/14/2017      Past Medical History:   Diagnosis Date    Diabetes mellitus     Diabetes mellitus type II     Heart murmur 1990's    Hyperlipidemia     Hypertension     Prostate cancer      Family History   Problem Relation Age of Onset    Diabetes Mother     Cancer Sister         breast    Cancer Brother         colon    Cancer Sister         abd cancer    No Known Problems Father     No Known Problems Maternal Aunt     No Known Problems Maternal Uncle     No Known Problems Paternal Aunt     No Known Problems Paternal Uncle     No Known Problems Maternal Grandmother     No Known Problems Maternal Grandfather     No Known Problems Paternal Grandmother     No Known Problems Paternal Grandfather     Amblyopia Neg Hx     Blindness Neg Hx     Cataracts Neg Hx     Glaucoma Neg Hx     Hypertension Neg Hx     Macular degeneration Neg Hx     Retinal detachment Neg Hx     Strabismus Neg Hx     Stroke Neg Hx     Thyroid disease Neg Hx      Social History     Socioeconomic History    Marital status:      Spouse name: Not on file    Number of children: Not on file    Years of education: Not on file    Highest education level: Not on file   Occupational History    Not on file   Social Needs    Financial resource strain: Not on file    Food insecurity:      Worry: Not on file     Inability: Not on file    Transportation needs:     Medical: Not on file     Non-medical: Not on file   Tobacco Use    Smoking status: Never Smoker    Smokeless tobacco: Never Used   Substance and Sexual Activity    Alcohol use: Yes     Comment: social    Drug use: No    Sexual activity: Yes     Partners: Female   Lifestyle    Physical activity:     Days per week: Not on file     Minutes per session: Not on file    Stress: Not on file   Relationships    Social connections:     Talks on phone: Not on file     Gets together: Not on file     Attends Restorationism service: Not on file     Active member of club or organization: Not on file     Attends meetings of clubs or organizations: Not on file     Relationship status: Not on file   Other Topics Concern    Not on file   Social History Narrative    Not on file     Past Surgical History:   Procedure Laterality Date    COLONOSCOPY      5yrs ago    COLONOSCOPY N/A 7/8/2015    Performed by [facility]AINE Ramon MD at [facility] ENDO (4TH FLR)    PROSTATE SURGERY      ROBOTIC ASSISTED LAPAROSCOPIC PROSTATECTOMY N/A 2/10/2017    Performed by Champ Chavez MD at [facility] OR 2ND FLR     Patient Active Problem List   Diagnosis    Hypertension    Hyperlipidemia    Type II or unspecified type diabetes mellitus without mention of complication, not stated as uncontrolled    Family history of prostate cancer    Prostate cancer     Review of Systems   Constitutional: Negative for appetite change, chills, fatigue, fever and unexpected weight change.   HENT: Negative for nosebleeds.    Respiratory: Negative for shortness of breath and wheezing.    Cardiovascular: Negative for chest pain, palpitations and leg swelling.   Gastrointestinal: Negative for abdominal distention, abdominal pain, constipation, diarrhea, nausea and vomiting.   Genitourinary: Negative for difficulty urinating, dysuria, hematuria and urgency.   Musculoskeletal: Negative for  "arthralgias and back pain.   Skin: Negative for pallor.   Neurological: Negative for dizziness, seizures and syncope.   Hematological: Negative for adenopathy.   Psychiatric/Behavioral: Negative for dysphoric mood.         Objective:      There were no vitals taken for this visit.  Estimated body mass index is 33.45 kg/m² as calculated from the following:    Height as of 1/17/19: 5' 8" (1.727 m).    Weight as of 1/17/19: 99.8 kg (220 lb).  Physical Exam   Constitutional: He is oriented to person, place, and time. He appears well-developed and well-nourished. No distress.   HENT:   Head: Atraumatic.   Neck: No tracheal deviation present.   Cardiovascular: Normal rate.    Pulmonary/Chest: Effort normal. No respiratory distress. He has no wheezes.   Abdominal: Soft. Bowel sounds are normal. He exhibits no distension and no mass. There is no tenderness. There is no rebound and no guarding.   Neurological: He is alert and oriented to person, place, and time.   Skin: Skin is warm and dry. He is not diaphoretic.     Psychiatric: He has a normal mood and affect. His behavior is normal. Judgment and thought content normal.         Assessment and Plan:           Problem List Items Addressed This Visit     Prostate cancer - Primary          Follow up:   6 months with PSA.  Patient interested in either PEP or prosthesis.  appointment with Dr. Sebastian.    Champ Chavez        "

## 2020-01-04 DIAGNOSIS — E11.69 TYPE 2 DIABETES MELLITUS WITH OTHER SPECIFIED COMPLICATION, WITHOUT LONG-TERM CURRENT USE OF INSULIN: ICD-10-CM

## 2020-01-04 DIAGNOSIS — I10 ESSENTIAL HYPERTENSION: ICD-10-CM

## 2020-01-04 DIAGNOSIS — E78.5 HYPERLIPIDEMIA, UNSPECIFIED HYPERLIPIDEMIA TYPE: Primary | ICD-10-CM

## 2020-01-06 RX ORDER — LOSARTAN POTASSIUM 50 MG/1
TABLET ORAL
Qty: 90 TABLET | Refills: 0 | OUTPATIENT
Start: 2020-01-06

## 2020-01-06 RX ORDER — METFORMIN HYDROCHLORIDE 1000 MG/1
TABLET ORAL
Qty: 180 TABLET | Refills: 0 | OUTPATIENT
Start: 2020-01-06

## 2020-01-06 NOTE — TELEPHONE ENCOUNTER
I have reviewed and agree with the assessment below.  Pt has not been seen for f/u appt or labs since 2016; Pt IS adherent to medications however; will schedule f/u after your decision.  Thanks

## 2020-01-06 NOTE — PROGRESS NOTES
Refill Authorization Note     is requesting a refill authorization.    Brief assessment and rationale for refill: REVIEW: Unclear if pt still follows with PCP/Overdue for Labs and Annual     Medication-related problems identified:   Requires appointment  Requires labs    Medication Therapy Plan: Overdue for annual; NTBL (CMP/Lipid/A1C) to labs scheduled 1/24/2020; please review    Medication reconciliation completed: No   Pharmacist Review Requested: Yes                     Comments:   Requested Prescriptions   Pending Prescriptions Disp Refills    losartan (COZAAR) 50 MG tablet [Pharmacy Med Name: LOSARTAN 50MG TABLETS] 90 tablet 0     Sig: TAKE 1 TABLET(50 MG) BY MOUTH EVERY DAY       Cardiovascular:  Angiotensin Receptor Blockers Failed - 1/4/2020  5:39 AM        Failed - Office visit in past 12 months or future 90 days     Recent Outpatient Visits            5 months ago Prostate cancer    Romeo Shankar  UrologXiomara Chavez MD    11 months ago Prostate cancer    Romeo Shankar  Reji Chavez MD    1 year ago Prostate cancer    Romeo Shankar  Reji Chavez MD    1 year ago Prostate cancer    [facility] Dio Stone NP    1 year ago Prostate cancer    Romeo martin  Reji Chavez MD          Future Appointments              [facility]    In 3 weeks MD Romeo Madrigal  Romeo Beatty    In 1 month [facility], [facility] [place]    In 1 month MD Romeo Madrigal  Romeo Beatty                Failed - Cr is 1.4 or below and within 360 days     Creatinine   Date Value Ref Range Status   01/31/2017 1.1 0.5 - 1.4 mg/dL Final   07/29/2016 1.1 0.5 - 1.4 mg/dL Final   04/29/2015 1.0 0.5 - 1.4 mg/dL Final     POC Creatinine   Date Value Ref Range Status   09/22/2016 1.0 0.5 - 1.4 mg/dL Final              Failed - K in  normal range and within 360 days     Potassium   Date Value Ref Range Status   01/31/2017 4.0 3.5 - 5.1 mmol/L Final   07/29/2016 4.2 3.5 - 5.1 mmol/L Final   04/29/2015 4.2 3.5 - 5.1 mmol/L Final              Failed - eGFR within 360 days     eGFR if non    Date Value Ref Range Status   01/31/2017 >60.0 >60 mL/min/1.73 m^2 Final     Comment:     Calculation used to obtain the estimated glomerular filtration  rate (eGFR) is the CKD-EPI equation. Since race is unknown   in our information system, the eGFR values for   -American and Non--American patients are given   for each creatinine result.     07/29/2016 >60.0 >60 mL/min/1.73 m^2 Final     Comment:     Calculation used to obtain the estimated glomerular filtration  rate (eGFR) is the CKD-EPI equation. Since race is unknown   in our information system, the eGFR values for   -American and Non--American patients are given   for each creatinine result.     04/29/2015 >60.0 >60 mL/min/1.73 m^2 Final     Comment:     Calculation used to obtain the estimated glomerular filtration  rate (eGFR) is the CKD-EPI equation. Since race is unknown   in our information system, the eGFR values for   -American and Non--American patients are given   for each creatinine result.       eGFR if    Date Value Ref Range Status   01/31/2017 >60.0 >60 mL/min/1.73 m^2 Final   07/29/2016 >60.0 >60 mL/min/1.73 m^2 Final   04/29/2015 >60.0 >60 mL/min/1.73 m^2 Final              Passed - Patient is at least 18 years old        Passed - Last BP in normal range within 360 days     BP Readings from Last 3 Encounters:   07/30/19 139/79   01/17/19 136/74   07/17/18 137/82             metFORMIN (GLUCOPHAGE) 1000 MG tablet [Pharmacy Med Name: METFORMIN 1000MG TABLETS] 180 tablet 0     Sig: TAKE 1 TABLET BY MOUTH EVERY 12 HOURS       Endocrinology:  Diabetes - Biguanides Failed - 1/4/2020  5:39 AM        Failed - Office visit in  past 12 months or future 90 days     Recent Outpatient Visits            5 months ago Prostate cancer    Romeo Shankar - Urology Dio Chavez MD    11 months ago Prostate cancer    Romeo Shankar - UrologXiomara Chavez MD    1 year ago Prostate cancer    Romeo Shankar - Reji Chavez MD    1 year ago Prostate cancer    Romeo Shankar - Urology Dio Stone, JULIO    1 year ago Prostate cancer    Romeo Shankar - UrologXiomara Chavez MD          Future Appointments              In 2 weeks [facility], [facility] [place]    In 3 weeks MD Romeo Madrigal  Urolog Romeo [place]    In 1 month LAB, [facility] [place]    In 1 month MD Romeo Madrigal - UrologRomeo Sol[place]                Failed - Cr is 1.4 or below and within 360 days     Creatinine   Date Value Ref Range Status   01/31/2017 1.1 0.5 - 1.4 mg/dL Final   07/29/2016 1.1 0.5 - 1.4 mg/dL Final   04/29/2015 1.0 0.5 - 1.4 mg/dL Final     POC Creatinine   Date Value Ref Range Status   09/22/2016 1.0 0.5 - 1.4 mg/dL Final              Failed - HBA1C is 7.9 or below and within 180 days     Hemoglobin A1C   Date Value Ref Range Status   07/29/2016 6.8 (H) 4.5 - 6.2 % Final     Comment:     According to ADA guidelines, hemoglobin A1C <7.0% represents  optimal control in non-pregnant diabetic patients.  Different  metrics may apply to specific populations.   Standards of Medical Care in Diabetes - 2016.  For the purpose of screening for the presence of diabetes:  <5.7%     Consistent with the absence of diabetes  5.7-6.4%  Consistent with increasing risk for diabetes   (prediabetes)  >or=6.5%  Consistent with diabetes  Currently no consensus exists for use of hemoglobin A1C  for diagnosis of diabetes for children.     04/29/2015 7.0 (H) 4.5 - 6.2 % Final   08/14/2012 6.9 (H) 4.0 - 6.2 % Final              Failed - eGFR is 30 or above and within 360  days     eGFR if non    Date Value Ref Range Status   01/31/2017 >60.0 >60 mL/min/1.73 m^2 Final     Comment:     Calculation used to obtain the estimated glomerular filtration  rate (eGFR) is the CKD-EPI equation. Since race is unknown   in our information system, the eGFR values for   -American and Non--American patients are given   for each creatinine result.     07/29/2016 >60.0 >60 mL/min/1.73 m^2 Final     Comment:     Calculation used to obtain the estimated glomerular filtration  rate (eGFR) is the CKD-EPI equation. Since race is unknown   in our information system, the eGFR values for   -American and Non--American patients are given   for each creatinine result.     04/29/2015 >60.0 >60 mL/min/1.73 m^2 Final     Comment:     Calculation used to obtain the estimated glomerular filtration  rate (eGFR) is the CKD-EPI equation. Since race is unknown   in our information system, the eGFR values for   -American and Non--American patients are given   for each creatinine result.       eGFR if    Date Value Ref Range Status   01/31/2017 >60.0 >60 mL/min/1.73 m^2 Final   07/29/2016 >60.0 >60 mL/min/1.73 m^2 Final   04/29/2015 >60.0 >60 mL/min/1.73 m^2 Final              Passed - Patient is at least 18 years old        Appointments  past 12m or future 3m with PCP    Date Provider   Last Visit   8/2/2016 Shilo Gooden MD   Next Visit   Visit date not found Shilo Gooden MD

## 2020-01-07 NOTE — TELEPHONE ENCOUNTER
Please note denial of medication. Patient will need to establish with a new-different PCP before getting any refills. Thanks

## 2020-01-09 ENCOUNTER — OFFICE VISIT (OUTPATIENT)
Dept: INTERNAL MEDICINE | Facility: CLINIC | Age: 65
End: 2020-01-09
Payer: COMMERCIAL

## 2020-01-09 VITALS
HEIGHT: 68 IN | HEART RATE: 81 BPM | BODY MASS INDEX: 33.95 KG/M2 | TEMPERATURE: 98 F | WEIGHT: 224 LBS | DIASTOLIC BLOOD PRESSURE: 80 MMHG | SYSTOLIC BLOOD PRESSURE: 144 MMHG | OXYGEN SATURATION: 99 %

## 2020-01-09 DIAGNOSIS — Z01.00 DIABETIC EYE EXAM: ICD-10-CM

## 2020-01-09 DIAGNOSIS — E78.2 MIXED HYPERLIPIDEMIA: ICD-10-CM

## 2020-01-09 DIAGNOSIS — E66.9 OBESITY (BMI 30.0-34.9): ICD-10-CM

## 2020-01-09 DIAGNOSIS — I10 ESSENTIAL HYPERTENSION: ICD-10-CM

## 2020-01-09 DIAGNOSIS — E11.9 TYPE 2 DIABETES MELLITUS WITHOUT COMPLICATION, WITHOUT LONG-TERM CURRENT USE OF INSULIN: ICD-10-CM

## 2020-01-09 DIAGNOSIS — Z11.59 ENCOUNTER FOR HEPATITIS C SCREENING TEST FOR LOW RISK PATIENT: ICD-10-CM

## 2020-01-09 DIAGNOSIS — E11.9 ENCOUNTER FOR DIABETIC FOOT EXAM: ICD-10-CM

## 2020-01-09 DIAGNOSIS — E11.9 DIABETIC EYE EXAM: ICD-10-CM

## 2020-01-09 DIAGNOSIS — Z00.00 ENCOUNTER FOR HEALTH MAINTENANCE EXAMINATION: Primary | ICD-10-CM

## 2020-01-09 DIAGNOSIS — Z23 NEED FOR TDAP VACCINATION: ICD-10-CM

## 2020-01-09 DIAGNOSIS — Z23 NEED FOR INFLUENZA VACCINATION: ICD-10-CM

## 2020-01-09 DIAGNOSIS — Z23 NEED FOR VACCINATION WITH 13-POLYVALENT PNEUMOCOCCAL CONJUGATE VACCINE: ICD-10-CM

## 2020-01-09 PROCEDURE — 99386 PR PREVENTIVE VISIT,NEW,40-64: ICD-10-PCS | Mod: S$GLB,,, | Performed by: NURSE PRACTITIONER

## 2020-01-09 PROCEDURE — 99999 PR PBB SHADOW E&M-EST. PATIENT-LVL IV: ICD-10-PCS | Mod: PBBFAC,,, | Performed by: NURSE PRACTITIONER

## 2020-01-09 PROCEDURE — 99386 PREV VISIT NEW AGE 40-64: CPT | Mod: S$GLB,,, | Performed by: NURSE PRACTITIONER

## 2020-01-09 PROCEDURE — 99999 PR PBB SHADOW E&M-EST. PATIENT-LVL IV: CPT | Mod: PBBFAC,,, | Performed by: NURSE PRACTITIONER

## 2020-01-09 RX ORDER — LOSARTAN POTASSIUM 50 MG/1
TABLET ORAL
Qty: 90 TABLET | Refills: 1 | Status: SHIPPED | OUTPATIENT
Start: 2020-01-09 | End: 2020-07-02

## 2020-01-09 RX ORDER — METFORMIN HYDROCHLORIDE 1000 MG/1
1000 TABLET ORAL EVERY 12 HOURS
Qty: 180 TABLET | Refills: 1 | Status: SHIPPED | OUTPATIENT
Start: 2020-01-09 | End: 2020-03-06

## 2020-01-09 NOTE — PATIENT INSTRUCTIONS
Refilled meds    Check labs, will call with results, if results stable, return in 4 months with one of new MDs listed that you choose who can be your new PCP. Dr. Perri King, Dr. Naman De Leon, Dr. Gracie Damon, Dr. Myron Henson, Dr. Rivera Ca    Referral to Optometry for diabetic eye exam    Referral to Podiatry for diabetic foot exam

## 2020-01-09 NOTE — PROGRESS NOTES
Subjective:       Patient ID: Sanchez Purcell is a 64 y.o. male.    Chief Complaint: Annual Exam    Pt of Dr. Gooden, here for med refill. Has not seen him since 2016, told he will need a new PCP. Here for annual check up.    No complaints.    Review of Systems   Constitutional: Negative for activity change, appetite change and unexpected weight change.   HENT: Negative for hearing loss and voice change.    Eyes: Negative for visual disturbance.   Respiratory: Negative for apnea, cough, chest tightness and shortness of breath.    Cardiovascular: Negative for chest pain, palpitations and leg swelling.   Gastrointestinal: Negative for abdominal distention, abdominal pain, blood in stool, constipation, diarrhea, nausea and vomiting.   Endocrine: Negative for cold intolerance, heat intolerance, polydipsia, polyphagia and polyuria.   Genitourinary: Negative for difficulty urinating, dysuria and penile pain.   Musculoskeletal: Negative for arthralgias and myalgias.   Skin: Negative for color change, pallor, rash and wound.   Allergic/Immunologic: Negative for environmental allergies, food allergies and immunocompromised state.   Neurological: Negative for dizziness and weakness.   Hematological: Negative for adenopathy. Does not bruise/bleed easily.   Psychiatric/Behavioral: Negative for agitation, behavioral problems, sleep disturbance and suicidal ideas.       Review of patient's allergies indicates:   Allergen Reactions    No known drug allergies      Current Outpatient Medications:     blood sugar diagnostic Strp, Free style lite, Disp: , Rfl:     CIALIS 20 mg Tab, , Disp: , Rfl:     losartan (COZAAR) 50 MG tablet, TAKE 1 TABLET(50 MG) BY MOUTH EVERY DAY, Disp: 90 tablet, Rfl: 3    metFORMIN (GLUCOPHAGE) 1000 MG tablet, TAKE 1 TABLET BY MOUTH EVERY 12 HOURS, Disp: 180 tablet, Rfl: 3    papaverine 30 mg/mL injection, Add: Phentolamine 10 mg Add: PGE1 100 mcg  Sig:  Give a TEST DOSE; 1st dose to be given under  medical supervision., Disp: 5 mL, Rfl: 0    ULTRA THIN LANCETS 31 gauge Misc, , Disp: , Rfl:     Patient Active Problem List   Diagnosis    Hypertension    Hyperlipidemia    Type 2 diabetes mellitus without complication    Family history of prostate cancer    Prostate cancer     Past Medical History:   Diagnosis Date    Diabetes mellitus     Diabetes mellitus type II     Heart murmur 1990's    Hyperlipidemia     Hypertension     Prostate cancer      Past Surgical History:   Procedure Laterality Date    COLONOSCOPY      5yrs ago    PROSTATE SURGERY       Social History     Socioeconomic History    Marital status:      Spouse name: Not on file    Number of children: Not on file    Years of education: Not on file    Highest education level: Not on file   Occupational History    Not on file   Social Needs    Financial resource strain: Not on file    Food insecurity:     Worry: Not on file     Inability: Not on file    Transportation needs:     Medical: Not on file     Non-medical: Not on file   Tobacco Use    Smoking status: Never Smoker    Smokeless tobacco: Never Used   Substance and Sexual Activity    Alcohol use: Yes     Comment: social    Drug use: No    Sexual activity: Yes     Partners: Female   Lifestyle    Physical activity:     Days per week: Not on file     Minutes per session: Not on file    Stress: Not on file   Relationships    Social connections:     Talks on phone: Not on file     Gets together: Not on file     Attends Jew service: Not on file     Active member of club or organization: Not on file     Attends meetings of clubs or organizations: Not on file     Relationship status: Not on file   Other Topics Concern    Not on file   Social History Narrative    Not on file     Family History   Problem Relation Age of Onset    Diabetes Mother     Cancer Sister         breast    Cancer Brother         colon    Cancer Sister         abd cancer    No Known  "Problems Father     No Known Problems Maternal Aunt     No Known Problems Maternal Uncle     No Known Problems Paternal Aunt     No Known Problems Paternal Uncle     No Known Problems Maternal Grandmother     No Known Problems Maternal Grandfather     No Known Problems Paternal Grandmother     No Known Problems Paternal Grandfather     Amblyopia Neg Hx     Blindness Neg Hx     Cataracts Neg Hx     Glaucoma Neg Hx     Hypertension Neg Hx     Macular degeneration Neg Hx     Retinal detachment Neg Hx     Strabismus Neg Hx     Stroke Neg Hx     Thyroid disease Neg Hx        Objective:       Vitals:    01/09/20 1434   BP: (!) 144/80   Pulse: 81   Temp: 98.4 °F (36.9 °C)   SpO2: 99%   Weight: 101.6 kg (223 lb 15.8 oz)   Height: 5' 8" (1.727 m)   PainSc: 0-No pain       Body mass index is 34.06 kg/m².    Physical Exam   Constitutional: He is oriented to person, place, and time. He appears well-developed and well-nourished.   obese   HENT:   Head: Normocephalic.   Eyes: Pupils are equal, round, and reactive to light. Conjunctivae, EOM and lids are normal. Lids are everted and swept, no foreign bodies found.   Neck: Trachea normal, normal range of motion and full passive range of motion without pain. Neck supple. No JVD present. Carotid bruit is not present.   Cardiovascular: Normal rate, regular rhythm, normal heart sounds, intact distal pulses and normal pulses.   Pulmonary/Chest: Effort normal and breath sounds normal.   Abdominal: Soft. Normal appearance and bowel sounds are normal. There is no hepatosplenomegaly. There is no CVA tenderness.   obese   Musculoskeletal: Normal range of motion.   Neurological: He is alert and oriented to person, place, and time.   Skin: Skin is warm, dry and intact. Capillary refill takes less than 2 seconds.   Psychiatric: He has a normal mood and affect. His speech is normal and behavior is normal. Judgment and thought content normal. Cognition and memory are normal. "   Vitals reviewed.      Assessment:       1. Encounter for health maintenance examination    2. Mixed hyperlipidemia    3. Essential hypertension    4. Type 2 diabetes mellitus without complication, without long-term current use of insulin    5. Diabetic eye exam    6. Encounter for diabetic foot exam    7. Need for influenza vaccination    8. Encounter for hepatitis C screening test for low risk patient    9. Need for Tdap vaccination    10. Need for vaccination with 13-polyvalent pneumococcal conjugate vaccine    11. BMI 34.0-34.9,adult    12. Obesity (BMI 30.0-34.9)        Plan:       Sanchez HOLMAN was seen today for annual exam.    Diagnoses and all orders for this visit:    Encounter for health maintenance examination  Annual wellness exam completed.    All medications, histories, and concerns reviewed, reconciled, and addressed.    Appropriate Screenings per pt's sex and age have been reviewed and discussed with pt.    BMI reviewed.    Mixed hyperlipidemia  -     Lipid panel; Future    Follow low fat, low carb, high fiber diet and exercise.    Essential hypertension  -     CBC auto differential; Future  -     Comprehensive metabolic panel; Future  -     TSH; Future  -     Urinalysis; Future  -     losartan (COZAAR) 50 MG tablet; TAKE 1 TABLET(50 MG) BY MOUTH EVERY DAY    Take medications as prescribed.    Monitor BP at home, goal BP < or = 140/80, call office if consistently above this range.    Follow low salt DASH diet and exercise.    BMI reviewed.    Go to ED if Headaches, blurred vision, chest pain, or SOB occurs along with elevated readings > or = 160/90.    Type 2 diabetes mellitus without complication, without long-term current use of insulin  -     Hemoglobin A1c; Future  -     Microalbumin/creatinine urine ratio; Future  -     metFORMIN (GLUCOPHAGE) 1000 MG tablet; Take 1 tablet (1,000 mg total) by mouth every 12 (twelve) hours.    A1C goal < 7.0, BP goal < 140/80, LDL goal < 100.    Adhere to ADA  diet.    Take meds as prescribed, check BS daily. Notify if sugars are out of range discussed during visit.    Yearly eye exam discussed.    Yearly foot exam discussed.    Diabetic eye exam  -     Ambulatory Referral to Optometry    Encounter for diabetic foot exam  -     Ambulatory Referral to Podiatry    Need for influenza vaccination  Refused    Encounter for hepatitis C screening test for low risk patient  -     Hepatitis C antibody; Future    Need for Tdap vaccination  Refused    Need for vaccination with 13-polyvalent pneumococcal conjugate vaccine  Refused    BMI 34.0-34.9,adult  BMI reviewed    Obesity (BMI 30.0-34.9)  BMI reviewed.    Diet and exercise to lose weight.    Refilled meds    Check labs, will call with results, if results stable, return in 4 months with one of new MDs listed that you choose who can be your new PCP. Dr. Perri King, Dr. Naman De Leon, Dr. Gracie Damon, Dr. Myron Henson, Dr. Rivera Ca    Referral to Optometry for diabetic eye exam    Referral to Podiatry for diabetic foot exam    Follow up in about 4 months (around 5/9/2020), or with one of MDs on AVS for f/u on chronic conditions and to establish new MD PCP.

## 2020-01-24 ENCOUNTER — LAB VISIT (OUTPATIENT)
Dept: LAB | Facility: HOSPITAL | Age: 65
End: 2020-01-24
Attending: NURSE PRACTITIONER
Payer: COMMERCIAL

## 2020-01-24 DIAGNOSIS — Z11.59 ENCOUNTER FOR HEPATITIS C SCREENING TEST FOR LOW RISK PATIENT: ICD-10-CM

## 2020-01-24 DIAGNOSIS — R76.8 POSITIVE HEPATITIS C ANTIBODY TEST: Primary | ICD-10-CM

## 2020-01-24 DIAGNOSIS — E78.2 MIXED HYPERLIPIDEMIA: ICD-10-CM

## 2020-01-24 DIAGNOSIS — I10 ESSENTIAL HYPERTENSION: ICD-10-CM

## 2020-01-24 DIAGNOSIS — E11.9 TYPE 2 DIABETES MELLITUS WITHOUT COMPLICATION, WITHOUT LONG-TERM CURRENT USE OF INSULIN: ICD-10-CM

## 2020-01-24 LAB
ALBUMIN SERPL BCP-MCNC: 4.1 G/DL (ref 3.5–5.2)
ALP SERPL-CCNC: 97 U/L (ref 55–135)
ALT SERPL W/O P-5'-P-CCNC: 24 U/L (ref 10–44)
ANION GAP SERPL CALC-SCNC: 6 MMOL/L (ref 8–16)
AST SERPL-CCNC: 18 U/L (ref 10–40)
BASOPHILS # BLD AUTO: 0.03 K/UL (ref 0–0.2)
BASOPHILS NFR BLD: 0.7 % (ref 0–1.9)
BILIRUB SERPL-MCNC: 0.5 MG/DL (ref 0.1–1)
BUN SERPL-MCNC: 13 MG/DL (ref 8–23)
CALCIUM SERPL-MCNC: 9.7 MG/DL (ref 8.7–10.5)
CHLORIDE SERPL-SCNC: 101 MMOL/L (ref 95–110)
CHOLEST SERPL-MCNC: 195 MG/DL (ref 120–199)
CHOLEST/HDLC SERPL: 4.5 {RATIO} (ref 2–5)
CO2 SERPL-SCNC: 31 MMOL/L (ref 23–29)
CREAT SERPL-MCNC: 1.1 MG/DL (ref 0.5–1.4)
DIFFERENTIAL METHOD: ABNORMAL
EOSINOPHIL # BLD AUTO: 0.1 K/UL (ref 0–0.5)
EOSINOPHIL NFR BLD: 1.8 % (ref 0–8)
ERYTHROCYTE [DISTWIDTH] IN BLOOD BY AUTOMATED COUNT: 13.7 % (ref 11.5–14.5)
EST. GFR  (AFRICAN AMERICAN): >60 ML/MIN/1.73 M^2
EST. GFR  (NON AFRICAN AMERICAN): >60 ML/MIN/1.73 M^2
ESTIMATED AVG GLUCOSE: 157 MG/DL (ref 68–131)
GLUCOSE SERPL-MCNC: 135 MG/DL (ref 70–110)
HBA1C MFR BLD HPLC: 7.1 % (ref 4–5.6)
HCT VFR BLD AUTO: 40.8 % (ref 40–54)
HCV AB SERPL QL IA: POSITIVE
HDLC SERPL-MCNC: 43 MG/DL (ref 40–75)
HDLC SERPL: 22.1 % (ref 20–50)
HGB BLD-MCNC: 12.4 G/DL (ref 14–18)
IMM GRANULOCYTES # BLD AUTO: 0.01 K/UL (ref 0–0.04)
IMM GRANULOCYTES NFR BLD AUTO: 0.2 % (ref 0–0.5)
LDLC SERPL CALC-MCNC: 118.2 MG/DL (ref 63–159)
LYMPHOCYTES # BLD AUTO: 1.7 K/UL (ref 1–4.8)
LYMPHOCYTES NFR BLD: 38.6 % (ref 18–48)
MCH RBC QN AUTO: 26.4 PG (ref 27–31)
MCHC RBC AUTO-ENTMCNC: 30.4 G/DL (ref 32–36)
MCV RBC AUTO: 87 FL (ref 82–98)
MONOCYTES # BLD AUTO: 0.5 K/UL (ref 0.3–1)
MONOCYTES NFR BLD: 10.3 % (ref 4–15)
NEUTROPHILS # BLD AUTO: 2.1 K/UL (ref 1.8–7.7)
NEUTROPHILS NFR BLD: 48.4 % (ref 38–73)
NONHDLC SERPL-MCNC: 152 MG/DL
NRBC BLD-RTO: 0 /100 WBC
PLATELET # BLD AUTO: 199 K/UL (ref 150–350)
PMV BLD AUTO: 11.1 FL (ref 9.2–12.9)
POTASSIUM SERPL-SCNC: 4.7 MMOL/L (ref 3.5–5.1)
PROT SERPL-MCNC: 8.1 G/DL (ref 6–8.4)
RBC # BLD AUTO: 4.7 M/UL (ref 4.6–6.2)
SODIUM SERPL-SCNC: 138 MMOL/L (ref 136–145)
TRIGL SERPL-MCNC: 169 MG/DL (ref 30–150)
TSH SERPL DL<=0.005 MIU/L-ACNC: 0.94 UIU/ML (ref 0.4–4)
WBC # BLD AUTO: 4.35 K/UL (ref 3.9–12.7)

## 2020-01-24 PROCEDURE — 36415 COLL VENOUS BLD VENIPUNCTURE: CPT | Mod: PO

## 2020-01-24 PROCEDURE — 83036 HEMOGLOBIN GLYCOSYLATED A1C: CPT

## 2020-01-24 PROCEDURE — 80053 COMPREHEN METABOLIC PANEL: CPT

## 2020-01-24 PROCEDURE — 85025 COMPLETE CBC W/AUTO DIFF WBC: CPT

## 2020-01-24 PROCEDURE — 86803 HEPATITIS C AB TEST: CPT

## 2020-01-24 PROCEDURE — 80061 LIPID PANEL: CPT

## 2020-01-24 PROCEDURE — 84443 ASSAY THYROID STIM HORMONE: CPT

## 2020-01-24 NOTE — PROGRESS NOTES
Call pt with annual lab results.    His hepatitis C screening came back positive. His liver enzymes do not seem to be affected, will need to have him scheduled with hepatology for follow up on this. Referral placed.    His diabetes number went up from 6.8 to 7.1, he will need to f/u with a new PCP, he needs to choose one from the list I gave him and follow up in 3 months on this. Make sure he is taking his diabetes meds and following a diabetic diet.

## 2020-01-27 ENCOUNTER — TELEPHONE (OUTPATIENT)
Dept: INTERNAL MEDICINE | Facility: CLINIC | Age: 65
End: 2020-01-27

## 2020-01-27 NOTE — TELEPHONE ENCOUNTER
----- Message from Janel Green DNP sent at 1/24/2020  4:17 PM CST -----  Call pt with annual lab results.    His hepatitis C screening came back positive. His liver enzymes do not seem to be affected, will need to have him scheduled with hepatology for follow up on this. Referral placed.    His diabetes number went up from 6.8 to 7.1, he will need to f/u with a new PCP, he needs to choose one from the list I gave him and follow up in 3 months on this. Make sure he is taking his diabetes meds and following a diabetic diet.

## 2020-01-28 ENCOUNTER — LAB VISIT (OUTPATIENT)
Dept: LAB | Facility: HOSPITAL | Age: 65
End: 2020-01-28
Attending: UROLOGY
Payer: COMMERCIAL

## 2020-01-28 ENCOUNTER — OFFICE VISIT (OUTPATIENT)
Dept: UROLOGY | Facility: CLINIC | Age: 65
End: 2020-01-28
Payer: COMMERCIAL

## 2020-01-28 VITALS
HEIGHT: 68 IN | BODY MASS INDEX: 34.08 KG/M2 | WEIGHT: 224.88 LBS | DIASTOLIC BLOOD PRESSURE: 86 MMHG | HEART RATE: 74 BPM | SYSTOLIC BLOOD PRESSURE: 152 MMHG

## 2020-01-28 DIAGNOSIS — C61 PROSTATE CANCER: Primary | ICD-10-CM

## 2020-01-28 DIAGNOSIS — C61 PROSTATE CANCER: ICD-10-CM

## 2020-01-28 LAB — COMPLEXED PSA SERPL-MCNC: 0.06 NG/ML (ref 0–4)

## 2020-01-28 PROCEDURE — 99999 PR PBB SHADOW E&M-EST. PATIENT-LVL III: CPT | Mod: PBBFAC,,, | Performed by: UROLOGY

## 2020-01-28 PROCEDURE — 99214 OFFICE O/P EST MOD 30 MIN: CPT | Mod: S$GLB,,, | Performed by: UROLOGY

## 2020-01-28 PROCEDURE — 99999 PR PBB SHADOW E&M-EST. PATIENT-LVL III: ICD-10-PCS | Mod: PBBFAC,,, | Performed by: UROLOGY

## 2020-01-28 PROCEDURE — 84153 ASSAY OF PSA TOTAL: CPT

## 2020-01-28 PROCEDURE — 36415 COLL VENOUS BLD VENIPUNCTURE: CPT

## 2020-01-28 PROCEDURE — 99214 PR OFFICE/OUTPT VISIT, EST, LEVL IV, 30-39 MIN: ICD-10-PCS | Mod: S$GLB,,, | Performed by: UROLOGY

## 2020-01-28 NOTE — PROGRESS NOTES
Clinic Note  1/28/2020      Subjective:         Chief Complaint:   SAMI Purcell is a 64 y.o. male  s/p RALP 02/10/2017  Rylee 7 (3+4); (-) extension, SV, but (+) apex margin; pT2c, pN0, pMx. Perfect continence.  Positive family history.  Trained with PEP but hasn't used it. Worried it will cause cancer to come back . Cialis doesn't help.      Lab Results   Component Value Date    PSA 7.2 (H) 07/29/2016    PSA 5.5 (H) 04/29/2015    PSA 2.8 09/26/2011    PSA 2.0 05/24/2010    PSADIAG 0.06 07/26/2019    PSADIAG 0.06 01/10/2019    PSADIAG 0.04 07/13/2018    PSADIAG 0.02 01/13/2018    PSADIAG <0.01 07/17/2017    PSADIAG 0.01 03/14/2017      Past Medical History:   Diagnosis Date    Diabetes mellitus     Diabetes mellitus type II     Heart murmur 1990's    Hyperlipidemia     Hypertension     Prostate cancer      Family History   Problem Relation Age of Onset    Diabetes Mother     Cancer Sister         breast    Cancer Brother         colon    Cancer Sister         abd cancer    No Known Problems Father     No Known Problems Maternal Aunt     No Known Problems Maternal Uncle     No Known Problems Paternal Aunt     No Known Problems Paternal Uncle     No Known Problems Maternal Grandmother     No Known Problems Maternal Grandfather     No Known Problems Paternal Grandmother     No Known Problems Paternal Grandfather     Amblyopia Neg Hx     Blindness Neg Hx     Cataracts Neg Hx     Glaucoma Neg Hx     Hypertension Neg Hx     Macular degeneration Neg Hx     Retinal detachment Neg Hx     Strabismus Neg Hx     Stroke Neg Hx     Thyroid disease Neg Hx      Social History     Socioeconomic History    Marital status:      Spouse name: Not on file    Number of children: Not on file    Years of education: Not on file    Highest education level: Not on file   Occupational History    Not on file   Social Needs    Financial resource strain: Not on file    Food insecurity:      "Worry: Not on file     Inability: Not on file    Transportation needs:     Medical: Not on file     Non-medical: Not on file   Tobacco Use    Smoking status: Never Smoker    Smokeless tobacco: Never Used   Substance and Sexual Activity    Alcohol use: Yes     Comment: social    Drug use: No    Sexual activity: Yes     Partners: Female   Lifestyle    Physical activity:     Days per week: Not on file     Minutes per session: Not on file    Stress: Not on file   Relationships    Social connections:     Talks on phone: Not on file     Gets together: Not on file     Attends Christianity service: Not on file     Active member of club or organization: Not on file     Attends meetings of clubs or organizations: Not on file     Relationship status: Not on file   Other Topics Concern    Not on file   Social History Narrative    Not on file     Past Surgical History:   Procedure Laterality Date    COLONOSCOPY      5yrs ago    PROSTATE SURGERY       Patient Active Problem List   Diagnosis    Hypertension    Hyperlipidemia    Type 2 diabetes mellitus without complication    Family history of prostate cancer    Prostate cancer     Review of Systems   Constitutional: Negative for activity change.   HENT: Negative for sore throat.    Respiratory: Negative for chest tightness.    Cardiovascular: Negative for chest pain.   Gastrointestinal: Negative for abdominal pain.   Genitourinary: Negative for difficulty urinating, hematuria and nocturia.   Musculoskeletal: Negative for arthralgias.   Neurological: Negative for light-headedness.         Objective:      There were no vitals taken for this visit.  Estimated body mass index is 34.06 kg/m² as calculated from the following:    Height as of 1/9/20: 5' 8" (1.727 m).    Weight as of 1/9/20: 101.6 kg (223 lb 15.8 oz).  Physical Exam   Constitutional: He is oriented to person, place, and time. He appears well-nourished.   HENT:   Head: Atraumatic.   Eyes: Pupils are equal, " round, and reactive to light. Right eye exhibits no discharge. Left eye exhibits no discharge.   Neck: Normal range of motion. Neck supple.   Cardiovascular: Normal rate.    Pulmonary/Chest: Effort normal. No respiratory distress.   Abdominal: Soft. He exhibits no distension.   Musculoskeletal: Normal range of motion. He exhibits no edema.   Neurological: He is alert and oriented to person, place, and time.   Skin: Skin is warm and dry.           Assessment and Plan:           Problem List Items Addressed This Visit     Prostate cancer - Primary          Follow up:   6 months PSA , IRLANDA.  PSA today.    Champ Chavez

## 2020-01-31 ENCOUNTER — DOCUMENTATION ONLY (OUTPATIENT)
Dept: TRANSPLANT | Facility: CLINIC | Age: 65
End: 2020-01-31

## 2020-01-31 NOTE — NURSING
Pt records reviewed.   Pt will be referred to Hepatitis C clinic  Positive hepatitis C antibody test  Initial referral received  from the [facility].   Referring Provider/diagnosis  Janel Green DNP    Referral letter sent to  patient.

## 2020-01-31 NOTE — LETTER
January 31, 2020    Sanchez Purcell  [address] [place] [address]      Dear Sanchez Purcell:    Your doctor has referred you to the [facility]. We are sending this letter to remind you to make an appointment with us to complete the referral process.     Please call us at [phone] to schedule an appointment. We look forward to seeing you soon.     If you received a call and have been scheduled, please disregard this letter.       Sincerely,        [facility]   [facility] [place] [address]  [phone]

## 2020-02-14 ENCOUNTER — OFFICE VISIT (OUTPATIENT)
Dept: OPTOMETRY | Facility: CLINIC | Age: 65
End: 2020-02-14
Payer: COMMERCIAL

## 2020-02-14 DIAGNOSIS — H43.813 POSTERIOR VITREOUS DETACHMENT OF BOTH EYES: ICD-10-CM

## 2020-02-14 DIAGNOSIS — E11.9 TYPE 2 DIABETES MELLITUS WITHOUT RETINOPATHY: Primary | ICD-10-CM

## 2020-02-14 DIAGNOSIS — H52.7 REFRACTIVE ERROR: ICD-10-CM

## 2020-02-14 PROCEDURE — 99999 PR PBB SHADOW E&M-EST. PATIENT-LVL I: ICD-10-PCS | Mod: PBBFAC,,, | Performed by: OPTOMETRIST

## 2020-02-14 PROCEDURE — 99999 PR PBB SHADOW E&M-EST. PATIENT-LVL I: CPT | Mod: PBBFAC,,, | Performed by: OPTOMETRIST

## 2020-02-14 PROCEDURE — 92004 COMPRE OPH EXAM NEW PT 1/>: CPT | Mod: S$GLB,,, | Performed by: OPTOMETRIST

## 2020-02-14 PROCEDURE — 92015 PR REFRACTION: ICD-10-PCS | Mod: S$GLB,,, | Performed by: OPTOMETRIST

## 2020-02-14 PROCEDURE — 92015 DETERMINE REFRACTIVE STATE: CPT | Mod: S$GLB,,, | Performed by: OPTOMETRIST

## 2020-02-14 PROCEDURE — 92004 PR EYE EXAM, NEW PATIENT,COMPREHESV: ICD-10-PCS | Mod: S$GLB,,, | Performed by: OPTOMETRIST

## 2020-02-14 NOTE — PROGRESS NOTES
Subjective:       Patient ID: Sanchez Purcell is a 64 y.o. male      Chief Complaint   Patient presents with    Concerns About Ocular Health    Diabetic Eye Exam     History of Present Illness  Dls: 4/30/15 Dr. Green     65 y/o male presents today for diabetic eye exam.  Pt c/o blurry vision at distance ou. Pt wears single vision glasses for driving.    LBS ?     No tearing  No itching   No burning  No pain  No ha's  + floaters  No flashes    Eye meds  None    Hemoglobin A1C       Date                     Value               Ref Range           Status             01/24/2020               7.1 (H)             4.0 - 5.6 %         Final             07/29/2016               6.8 (H)             4.5 - 6.2 %         Final             04/29/2015               7.0 (H)             4.5 - 6.2 %         Final              Assessment/Plan:     1. Type 2 diabetes mellitus without retinopathy  No diabetic retinopathy. Discussed with pt the effects of diabetes on vision, importance of good blood sugar control, compliance with meds, and follow up care with PCP. Return in 1 year for dilated eye exam, sooner PRN.    2. Posterior vitreous detachment of both eyes  Stable. Monitor.    3. Refractive error  Educated patient on refractive error and discussed lens options. Dispensed updated spectacle Rx. Educated about adaptation period to new specs.    Eyeglass Final Rx     Eyeglass Final Rx       Sphere Cylinder Axis Add    Right -1.25 +0.25 005 +2.50    Left -0.75 Sphere  +2.50    Expiration Date:  2/14/2021                  Follow up in about 1 year (around 2/14/2021) for Diabetic Eye Exam.

## 2020-02-14 NOTE — LETTER
February 14, 2020      Janel Green DNP  [address]  [place] [address]           [facility]  [address]  [place] [address]  Phone: [phone]  Fax: [phone]          Patient: Sanchez Purcell   MR Number: [id number]   YOB: 1955   Date of Visit: 2/14/2020       Dear Janel Green:    Thank you for referring Sanchez Purcell to me for evaluation. Attached you will find relevant portions of my assessment and plan of care.    If you have questions, please do not hesitate to call me. I look forward to following Sanchez Purcell along with you.    Sincerely,    Sofia Moreno, OD    Enclosure  CC:  No Recipients    If you would like to receive this communication electronically, please contact [email or web address] or [phone] to request more information on [facility] Link access.    For providers and/or their staff who would like to refer a patient to [facility], please contact us through our one-stop-shop provider referral line, [facility], at [phone].    If you feel you have received this communication in error or would no longer like to receive these types of communications, please e-mail [email or web address]

## 2020-03-06 ENCOUNTER — OFFICE VISIT (OUTPATIENT)
Dept: INTERNAL MEDICINE | Facility: CLINIC | Age: 65
End: 2020-03-06
Payer: COMMERCIAL

## 2020-03-06 ENCOUNTER — LAB VISIT (OUTPATIENT)
Dept: LAB | Facility: HOSPITAL | Age: 65
End: 2020-03-06
Attending: INTERNAL MEDICINE
Payer: COMMERCIAL

## 2020-03-06 VITALS
BODY MASS INDEX: 33.21 KG/M2 | WEIGHT: 219.13 LBS | HEART RATE: 70 BPM | HEIGHT: 68 IN | DIASTOLIC BLOOD PRESSURE: 84 MMHG | SYSTOLIC BLOOD PRESSURE: 128 MMHG | TEMPERATURE: 98 F

## 2020-03-06 DIAGNOSIS — C61 PROSTATE CANCER: ICD-10-CM

## 2020-03-06 DIAGNOSIS — B35.3 TINEA PEDIS OF BOTH FEET: ICD-10-CM

## 2020-03-06 DIAGNOSIS — E11.69 DIABETES MELLITUS TYPE 2 IN OBESE: Primary | ICD-10-CM

## 2020-03-06 DIAGNOSIS — E11.69 HYPERLIPIDEMIA ASSOCIATED WITH TYPE 2 DIABETES MELLITUS: ICD-10-CM

## 2020-03-06 DIAGNOSIS — I15.2 HYPERTENSION ASSOCIATED WITH DIABETES: ICD-10-CM

## 2020-03-06 DIAGNOSIS — R76.8 HEPATITIS C ANTIBODY TEST POSITIVE: ICD-10-CM

## 2020-03-06 DIAGNOSIS — G47.19 EXCESSIVE DAYTIME SLEEPINESS: ICD-10-CM

## 2020-03-06 DIAGNOSIS — D64.9 NORMOCYTIC ANEMIA: ICD-10-CM

## 2020-03-06 DIAGNOSIS — Z80.0 FAMILY HISTORY OF COLON CANCER: ICD-10-CM

## 2020-03-06 DIAGNOSIS — R06.83 SNORING: ICD-10-CM

## 2020-03-06 DIAGNOSIS — E66.9 DIABETES MELLITUS TYPE 2 IN OBESE: Primary | ICD-10-CM

## 2020-03-06 DIAGNOSIS — E11.69 DIABETES MELLITUS TYPE 2 IN OBESE: ICD-10-CM

## 2020-03-06 DIAGNOSIS — E11.59 HYPERTENSION ASSOCIATED WITH DIABETES: ICD-10-CM

## 2020-03-06 DIAGNOSIS — E78.5 HYPERLIPIDEMIA ASSOCIATED WITH TYPE 2 DIABETES MELLITUS: ICD-10-CM

## 2020-03-06 DIAGNOSIS — E66.9 DIABETES MELLITUS TYPE 2 IN OBESE: ICD-10-CM

## 2020-03-06 LAB
ALBUMIN/CREAT UR: 4.9 UG/MG (ref 0–30)
CREAT UR-MCNC: 144 MG/DL (ref 23–375)
MICROALBUMIN UR DL<=1MG/L-MCNC: 7 UG/ML

## 2020-03-06 PROCEDURE — 82043 UR ALBUMIN QUANTITATIVE: CPT

## 2020-03-06 PROCEDURE — 99215 OFFICE O/P EST HI 40 MIN: CPT | Mod: S$GLB,,, | Performed by: INTERNAL MEDICINE

## 2020-03-06 PROCEDURE — 99215 PR OFFICE/OUTPT VISIT, EST, LEVL V, 40-54 MIN: ICD-10-PCS | Mod: S$GLB,,, | Performed by: INTERNAL MEDICINE

## 2020-03-06 PROCEDURE — 99999 PR PBB SHADOW E&M-EST. PATIENT-LVL III: CPT | Mod: PBBFAC,,, | Performed by: INTERNAL MEDICINE

## 2020-03-06 PROCEDURE — 99999 PR PBB SHADOW E&M-EST. PATIENT-LVL III: ICD-10-PCS | Mod: PBBFAC,,, | Performed by: INTERNAL MEDICINE

## 2020-03-06 RX ORDER — CLOTRIMAZOLE AND BETAMETHASONE DIPROPIONATE 10; .64 MG/G; MG/G
CREAM TOPICAL 2 TIMES DAILY
Qty: 45 G | Refills: 1 | Status: SHIPPED | OUTPATIENT
Start: 2020-03-06 | End: 2020-04-05

## 2020-03-06 RX ORDER — METFORMIN HYDROCHLORIDE 500 MG/1
1000 TABLET, EXTENDED RELEASE ORAL
Qty: 180 TABLET | Refills: 3 | Status: SHIPPED | OUTPATIENT
Start: 2020-03-06 | End: 2021-02-25

## 2020-03-06 RX ORDER — ATORVASTATIN CALCIUM 20 MG/1
20 TABLET, FILM COATED ORAL DAILY
Qty: 90 TABLET | Refills: 3 | Status: SHIPPED | OUTPATIENT
Start: 2020-03-06 | End: 2021-02-25

## 2020-03-06 NOTE — PROGRESS NOTES
INTERNAL MEDICINE INITIAL VISIT NOTE      CHIEF COMPLAINT     Est care    HPI     Sanchez Purcell is a 64 y.o. AA male, accompanied by wife Doreen, who presents for [facility] care    Normocytic anemia - chronic x >9 yrs. No fam h/o anemia he knows of.    Excessive sleepiness. Feels like he has to nap. Snores loudly.   Does not always feel refreshed but then he wakes up at 4am daily.     On screening tests, hep c antibody positive. Has appt w/ hepatology next Friday. Hasn't had RNA tested.     DM2 dx in the 1990s. On metformin 1000mg BID. No issues while taking meds. Does not exercise.   a1c 1/24/20 7.1  Eye -  Vo 2/14/20  MAC - due. On losartan 50mg daily.   Foot - due    HTN - losartan 50mg daily.     Prostate CA 2017 s/p RALP 2/2017.  Follows w/ Dr. Chavez - last seen 1/28/20; f/u in 6 mo.   PSA 1/28/20 wnl.     Brother and sister passed from colon CA.   Last cscope 7/2015, neg.     Past Medical History:  Past Medical History:   Diagnosis Date    Diabetes mellitus     Diabetes mellitus type II     Heart murmur 1990's    Hyperlipidemia     Hypertension     Prostate cancer        Past Surgical History:  Past Surgical History:   Procedure Laterality Date    COLONOSCOPY      5yrs ago    PROSTATE SURGERY         Allergies:  Review of patient's allergies indicates:   Allergen Reactions    No known drug allergies        Home Medications:  Prior to Admission medications    Medication Sig Start Date End Date Taking? Authorizing Provider   blood sugar diagnostic Strp Free style lite 4/17/12  Yes Historical Provider, MD   CIALIS 20 mg Tab  5/23/17  Yes Historical Provider, MD   losartan (COZAAR) 50 MG tablet TAKE 1 TABLET(50 MG) BY MOUTH EVERY DAY 1/9/20  Yes Janel Green DNP   metFORMIN (GLUCOPHAGE) 1000 MG tablet Take 1 tablet (1,000 mg total) by mouth every 12 (twelve) hours. 1/9/20  Yes Janel Green DNP   ULTRA THIN LANCETS 31 gauge Misc  7/5/17  Yes Historical Provider, MD   papaverine 30 mg/mL  "injection Add: Phentolamine 10 mg  Add: PGE1 100 mcg    Sig:  Give a TEST DOSE; 1st dose to be given under medical supervision.  Patient not taking: Reported on 3/6/2020 1/25/18   Jolie Stone NP       Family History:  Family History   Problem Relation Age of Onset    Diabetes Mother     Cataracts Mother     Cancer Sister         breast    Cancer Brother         colon    Cancer Sister         abd cancer    Cataracts Father     No Known Problems Maternal Aunt     No Known Problems Maternal Uncle     No Known Problems Paternal Aunt     No Known Problems Paternal Uncle     No Known Problems Maternal Grandmother     No Known Problems Maternal Grandfather     No Known Problems Paternal Grandmother     No Known Problems Paternal Grandfather     Amblyopia Neg Hx     Blindness Neg Hx     Glaucoma Neg Hx     Hypertension Neg Hx     Macular degeneration Neg Hx     Retinal detachment Neg Hx     Strabismus Neg Hx     Stroke Neg Hx     Thyroid disease Neg Hx        Social History:  Social History     Tobacco Use    Smoking status: Never Smoker    Smokeless tobacco: Never Used   Substance Use Topics    Alcohol use: Yes     Comment: social    Drug use: No       Review of Systems:  Review of Systems Comprehensive review of systems otherwise negative. See history/subjective section for more details.    Health Maintainence:    reviewed. Due for Cscope 7/2020. Declined vaccinations.     PHYSICAL EXAM     /84 (BP Location: Left arm, Patient Position: Sitting, BP Method: Large (Manual))   Pulse 70   Temp 98.1 °F (36.7 °C)   Ht 5' 8" (1.727 m)   Wt 99.4 kg (219 lb 2.2 oz)   BMI 33.32 kg/m²     GEN - A+OX4, NAD   HEENT - PERRL, EOMI, OP clear but small. MMM. TM normal.   Neck - No thyromegaly or cervical LAD. No thyroid masses felt.  CV - RRR, no m/r   Chest - CTAB, no wheezing or rhonchi  Abd - S/NT/ND/+BS.   Ext - 2+BDP and radial pulses. No LE edema.   Foot - normal sensation to " monofilament. No open lesions. Fungus in between toes.  Neuro - PERRL, EOMI, no nystagmus, eyebrow raise, facial sensation, hearing, m of mastication, smile, palatal raise, shoulder shrug, tongue protrusion symmetric and intact. 5/5 BUE and BLE strength. Sensation to light touch intact throughout. 2+ DTRs. Normal gait.   MSK - No spinal tenderness to palpation. Normal gait.   Skin - No rash.    LABS     Previous labs reviewed.    ASSESSMENT/PLAN     Sanchez Purcell is a 64 y.o. male with  Sanchez HOLMAN was seen today for annual exam.    Diagnoses and all orders for this visit:    Diabetes mellitus type 2 in obese  -     Microalbumin/creatinine urine ratio; Future; Expected date: 03/06/2020  -     metFORMIN (GLUCOPHAGE-XR) 500 MG XR 24hr tablet; Take 2 tablets (1,000 mg total) by mouth daily with breakfast.    Excessive daytime sleepiness  -     Home Sleep Studies; Future    Snoring  -     Home Sleep Studies; Future    Normocytic anemia  -     CBC auto differential; Future; Expected date: 03/06/2020  -     Ferritin; Future; Expected date: 03/06/2020  -     Folate; Future; Expected date: 03/06/2020  -     Iron and TIBC; Future; Expected date: 03/06/2020  -     Vitamin B12; Future; Expected date: 03/06/2020    Hepatitis C antibody test positive - discussed will need PCR to tell if active hep C. Antibody only = exposure.   -     Hepatitis C RNA, quantitative, PCR; Future; Expected date: 03/06/2020    Hyperlipidemia associated with type 2 diabetes mellitus - start  -     atorvastatin (LIPITOR) 20 MG tablet; Take 1 tablet (20 mg total) by mouth once daily.    Hypertension associated with diabetes - Stable and controlled. Continue current medications.    Prostate cancer - f/u w/ Dr. Chavez.    Family history of colon cancer - due for Cscope in July 2020.    Tinea pedis of both feet  -     clotrimazole-betamethasone 1-0.05% (LOTRISONE) cream; Apply topically 2 (two) times daily.    45 minutes was spent on patient with over half  the time was spent in coordination of care and/or counseling.    RTC in 3 months, sooner if needed and depending on labs.    Perri King MD  Department of Internal Medicine - [facility] [address]  11:00 AM

## 2020-03-09 ENCOUNTER — TELEPHONE (OUTPATIENT)
Dept: INTERNAL MEDICINE | Facility: CLINIC | Age: 65
End: 2020-03-09

## 2020-03-09 DIAGNOSIS — R76.8 HEPATITIS C ANTIBODY TEST POSITIVE: ICD-10-CM

## 2020-03-09 NOTE — TELEPHONE ENCOUNTER
Please call and notify pt:    Anemia is chronic and stable.  Iron levels, vitamin B12 and folate levels are normal.  Hepatitis C viral load is undetectable.  This means, as discussed in clinic, you were exposed to hepatitis C as some point in time but your body cleared it.  There is nothing additional to do about this at this time.

## 2020-03-09 NOTE — TELEPHONE ENCOUNTER
Pt informed of results, understanding verbalized.    Pt stated he thinks it was [facility] who called him today and told him that the CPAP machine was $487 even with insurance.

## 2020-03-10 NOTE — TELEPHONE ENCOUNTER
Spoke with pt. Pt agreed with taking the order to different DME companies to compare prices. Stated you can mail the order to the address on his chart.

## 2020-03-11 NOTE — TELEPHONE ENCOUNTER
Spoke to pt. Pt stated for him to do the sleep study at home he would have to pay for a machine that cost $487. Pt stated he will try to do the sleep study in office. Gave pt number to the sleep lab to schedule appt.

## 2020-03-13 ENCOUNTER — TELEPHONE (OUTPATIENT)
Dept: HEPATOLOGY | Facility: CLINIC | Age: 65
End: 2020-03-13

## 2020-03-13 DIAGNOSIS — Z86.19 HISTORY OF HEPATITIS C: Primary | ICD-10-CM

## 2020-03-13 NOTE — TELEPHONE ENCOUNTER
Pt scheduled to see me today in HCV clinic - didn't show up  Chart reviewed      LFT normal 1/2020    Please call patient to review the following:  (Please mail as well)  Labs show he has previously been exposed to HCV. HCV is an infection usually spread through the blood (blood transfusion before 1992, tattoos, IV drugs, snorting drugs) and occasionally spread through sex.    Approximately 15-40% of people who are infected will then get rid of the virus on their own and do not need treatment.  Right now there is NO evidence of the virus in the blood, so I suspect this is what happened.  Right now an appointment in the HCV clinic is NOT needed, but we will repeat the blood test to check for the virus one more time in a few months.  This will not protect against a repeat infection. If there is exposure again, a new infection could occur.    Schedule repeat HCV RNA in 3 months.   If that is negative, no further eval would be needed.

## 2020-03-13 NOTE — TELEPHONE ENCOUNTER
Attempt made to reach patient.  Unable to LVM.  Msg from PA Scheuermann mailed to him.  Lab scheduled 6/9/20; appt notice mailed.

## 2020-05-15 ENCOUNTER — TELEPHONE (OUTPATIENT)
Dept: SLEEP MEDICINE | Facility: HOSPITAL | Age: 65
End: 2020-05-15

## 2020-05-26 ENCOUNTER — TELEPHONE (OUTPATIENT)
Dept: SLEEP MEDICINE | Facility: HOSPITAL | Age: 65
End: 2020-05-26

## 2020-08-20 DIAGNOSIS — E11.9 TYPE 2 DIABETES MELLITUS WITHOUT COMPLICATION: ICD-10-CM

## 2020-10-05 ENCOUNTER — PATIENT MESSAGE (OUTPATIENT)
Dept: ADMINISTRATIVE | Facility: HOSPITAL | Age: 65
End: 2020-10-05

## 2021-01-04 ENCOUNTER — PATIENT MESSAGE (OUTPATIENT)
Dept: ADMINISTRATIVE | Facility: HOSPITAL | Age: 66
End: 2021-01-04

## 2021-02-04 DIAGNOSIS — I10 ESSENTIAL HYPERTENSION: ICD-10-CM

## 2021-02-04 RX ORDER — LOSARTAN POTASSIUM 50 MG/1
50 TABLET ORAL DAILY
Qty: 90 TABLET | Refills: 1 | Status: SHIPPED | OUTPATIENT
Start: 2021-02-04 | End: 2021-08-24

## 2021-02-17 DIAGNOSIS — E11.9 TYPE 2 DIABETES MELLITUS WITHOUT COMPLICATION: ICD-10-CM

## 2021-02-22 ENCOUNTER — OFFICE VISIT (OUTPATIENT)
Dept: PODIATRY | Facility: CLINIC | Age: 66
End: 2021-02-22
Payer: COMMERCIAL

## 2021-02-22 ENCOUNTER — PATIENT OUTREACH (OUTPATIENT)
Dept: ADMINISTRATIVE | Facility: OTHER | Age: 66
End: 2021-02-22

## 2021-02-22 VITALS — WEIGHT: 219.13 LBS | HEIGHT: 68 IN | BODY MASS INDEX: 33.21 KG/M2

## 2021-02-22 DIAGNOSIS — E11.9 TYPE 2 DIABETES MELLITUS WITHOUT COMPLICATION, WITHOUT LONG-TERM CURRENT USE OF INSULIN: Primary | ICD-10-CM

## 2021-02-22 DIAGNOSIS — B35.3 TINEA PEDIS, UNSPECIFIED LATERALITY: ICD-10-CM

## 2021-02-22 DIAGNOSIS — C61 PROSTATE CANCER: Primary | ICD-10-CM

## 2021-02-22 PROCEDURE — 99203 PR OFFICE/OUTPT VISIT, NEW, LEVL III, 30-44 MIN: ICD-10-PCS | Mod: S$GLB,,, | Performed by: PODIATRIST

## 2021-02-22 PROCEDURE — 99203 OFFICE O/P NEW LOW 30 MIN: CPT | Mod: S$GLB,,, | Performed by: PODIATRIST

## 2021-02-22 PROCEDURE — 99999 PR PBB SHADOW E&M-EST. PATIENT-LVL III: CPT | Mod: PBBFAC,,, | Performed by: PODIATRIST

## 2021-02-22 PROCEDURE — 99999 PR PBB SHADOW E&M-EST. PATIENT-LVL III: ICD-10-PCS | Mod: PBBFAC,,, | Performed by: PODIATRIST

## 2021-02-22 RX ORDER — CLOTRIMAZOLE AND BETAMETHASONE DIPROPIONATE 10; .64 MG/G; MG/G
CREAM TOPICAL 2 TIMES DAILY
Qty: 45 G | Refills: 3 | Status: SHIPPED | OUTPATIENT
Start: 2021-02-22 | End: 2021-06-09 | Stop reason: SDUPTHER

## 2021-02-24 DIAGNOSIS — E11.9 TYPE 2 DIABETES MELLITUS WITHOUT COMPLICATION, UNSPECIFIED WHETHER LONG TERM INSULIN USE: ICD-10-CM

## 2021-02-26 ENCOUNTER — LAB VISIT (OUTPATIENT)
Dept: LAB | Facility: HOSPITAL | Age: 66
End: 2021-02-26
Attending: UROLOGY
Payer: COMMERCIAL

## 2021-02-26 DIAGNOSIS — C61 PROSTATE CANCER: ICD-10-CM

## 2021-02-26 PROCEDURE — 84153 ASSAY OF PSA TOTAL: CPT

## 2021-02-26 PROCEDURE — 36415 COLL VENOUS BLD VENIPUNCTURE: CPT | Mod: PO

## 2021-02-27 LAB — COMPLEXED PSA SERPL-MCNC: 0.03 NG/ML (ref 0–4)

## 2021-03-02 ENCOUNTER — OFFICE VISIT (OUTPATIENT)
Dept: UROLOGY | Facility: CLINIC | Age: 66
End: 2021-03-02
Payer: COMMERCIAL

## 2021-03-02 VITALS
HEART RATE: 70 BPM | BODY MASS INDEX: 32.34 KG/M2 | DIASTOLIC BLOOD PRESSURE: 84 MMHG | SYSTOLIC BLOOD PRESSURE: 144 MMHG | HEIGHT: 68 IN | WEIGHT: 213.38 LBS

## 2021-03-02 DIAGNOSIS — C61 PROSTATE CANCER: Primary | ICD-10-CM

## 2021-03-02 PROCEDURE — 99213 OFFICE O/P EST LOW 20 MIN: CPT | Mod: S$GLB,,, | Performed by: UROLOGY

## 2021-03-02 PROCEDURE — 99213 PR OFFICE/OUTPT VISIT, EST, LEVL III, 20-29 MIN: ICD-10-PCS | Mod: S$GLB,,, | Performed by: UROLOGY

## 2021-03-02 PROCEDURE — 99999 PR PBB SHADOW E&M-EST. PATIENT-LVL III: ICD-10-PCS | Mod: PBBFAC,,, | Performed by: UROLOGY

## 2021-03-02 PROCEDURE — 99999 PR PBB SHADOW E&M-EST. PATIENT-LVL III: CPT | Mod: PBBFAC,,, | Performed by: UROLOGY

## 2021-03-10 ENCOUNTER — IMMUNIZATION (OUTPATIENT)
Dept: OBSTETRICS AND GYNECOLOGY | Facility: CLINIC | Age: 66
End: 2021-03-10
Payer: COMMERCIAL

## 2021-03-10 DIAGNOSIS — Z23 NEED FOR VACCINATION: Primary | ICD-10-CM

## 2021-03-10 PROCEDURE — 91300 COVID-19, MRNA, LNP-S, PF, 30 MCG/0.3 ML DOSE VACCINE: CPT | Mod: PBBFAC | Performed by: FAMILY MEDICINE

## 2021-03-31 ENCOUNTER — IMMUNIZATION (OUTPATIENT)
Dept: OBSTETRICS AND GYNECOLOGY | Facility: CLINIC | Age: 66
End: 2021-03-31
Payer: COMMERCIAL

## 2021-03-31 DIAGNOSIS — Z23 NEED FOR VACCINATION: Primary | ICD-10-CM

## 2021-03-31 PROCEDURE — 0002A COVID-19, MRNA, LNP-S, PF, 30 MCG/0.3 ML DOSE VACCINE: CPT | Mod: PBBFAC | Performed by: FAMILY MEDICINE

## 2021-03-31 PROCEDURE — 91300 COVID-19, MRNA, LNP-S, PF, 30 MCG/0.3 ML DOSE VACCINE: CPT | Mod: PBBFAC | Performed by: FAMILY MEDICINE

## 2021-04-29 ENCOUNTER — PATIENT MESSAGE (OUTPATIENT)
Dept: ADMINISTRATIVE | Facility: HOSPITAL | Age: 66
End: 2021-04-29

## 2021-04-29 ENCOUNTER — PATIENT OUTREACH (OUTPATIENT)
Dept: ADMINISTRATIVE | Facility: HOSPITAL | Age: 66
End: 2021-04-29

## 2021-05-03 ENCOUNTER — PATIENT OUTREACH (OUTPATIENT)
Dept: INTERNAL MEDICINE | Facility: CLINIC | Age: 66
End: 2021-05-03

## 2021-05-06 ENCOUNTER — PATIENT OUTREACH (OUTPATIENT)
Dept: ADMINISTRATIVE | Facility: HOSPITAL | Age: 66
End: 2021-05-06

## 2021-05-06 DIAGNOSIS — I15.2 HYPERTENSION ASSOCIATED WITH DIABETES: ICD-10-CM

## 2021-05-06 DIAGNOSIS — E11.9 TYPE 2 DIABETES MELLITUS WITHOUT COMPLICATION, WITHOUT LONG-TERM CURRENT USE OF INSULIN: ICD-10-CM

## 2021-05-06 DIAGNOSIS — E11.59 HYPERTENSION ASSOCIATED WITH DIABETES: ICD-10-CM

## 2021-05-06 DIAGNOSIS — Z00.00 ENCOUNTER FOR ANNUAL PHYSICAL EXAM: Primary | ICD-10-CM

## 2021-05-06 DIAGNOSIS — E11.69 HYPERLIPIDEMIA ASSOCIATED WITH TYPE 2 DIABETES MELLITUS: ICD-10-CM

## 2021-05-06 DIAGNOSIS — E78.5 HYPERLIPIDEMIA ASSOCIATED WITH TYPE 2 DIABETES MELLITUS: ICD-10-CM

## 2021-05-19 ENCOUNTER — PATIENT OUTREACH (OUTPATIENT)
Dept: ADMINISTRATIVE | Facility: HOSPITAL | Age: 66
End: 2021-05-19

## 2021-05-19 ENCOUNTER — PATIENT MESSAGE (OUTPATIENT)
Dept: ADMINISTRATIVE | Facility: HOSPITAL | Age: 66
End: 2021-05-19

## 2021-05-29 ENCOUNTER — LAB VISIT (OUTPATIENT)
Dept: LAB | Facility: HOSPITAL | Age: 66
End: 2021-05-29
Attending: INTERNAL MEDICINE
Payer: COMMERCIAL

## 2021-05-29 DIAGNOSIS — E11.9 TYPE 2 DIABETES MELLITUS WITHOUT COMPLICATION, WITHOUT LONG-TERM CURRENT USE OF INSULIN: ICD-10-CM

## 2021-05-29 DIAGNOSIS — E78.5 HYPERLIPIDEMIA ASSOCIATED WITH TYPE 2 DIABETES MELLITUS: ICD-10-CM

## 2021-05-29 DIAGNOSIS — E11.59 HYPERTENSION ASSOCIATED WITH DIABETES: ICD-10-CM

## 2021-05-29 DIAGNOSIS — E11.69 HYPERLIPIDEMIA ASSOCIATED WITH TYPE 2 DIABETES MELLITUS: ICD-10-CM

## 2021-05-29 DIAGNOSIS — E11.9 TYPE 2 DIABETES MELLITUS WITHOUT COMPLICATION: ICD-10-CM

## 2021-05-29 DIAGNOSIS — Z00.00 ENCOUNTER FOR ANNUAL PHYSICAL EXAM: ICD-10-CM

## 2021-05-29 DIAGNOSIS — I15.2 HYPERTENSION ASSOCIATED WITH DIABETES: ICD-10-CM

## 2021-05-29 LAB
ALBUMIN SERPL BCP-MCNC: 4 G/DL (ref 3.5–5.2)
ALP SERPL-CCNC: 151 U/L (ref 55–135)
ALT SERPL W/O P-5'-P-CCNC: 32 U/L (ref 10–44)
ANION GAP SERPL CALC-SCNC: 11 MMOL/L (ref 8–16)
AST SERPL-CCNC: 28 U/L (ref 10–40)
BASOPHILS # BLD AUTO: 0.03 K/UL (ref 0–0.2)
BASOPHILS NFR BLD: 0.8 % (ref 0–1.9)
BILIRUB SERPL-MCNC: 0.3 MG/DL (ref 0.1–1)
BUN SERPL-MCNC: 12 MG/DL (ref 8–23)
CALCIUM SERPL-MCNC: 10 MG/DL (ref 8.7–10.5)
CHLORIDE SERPL-SCNC: 104 MMOL/L (ref 95–110)
CHOLEST SERPL-MCNC: 159 MG/DL (ref 120–199)
CHOLEST/HDLC SERPL: 5.3 {RATIO} (ref 2–5)
CO2 SERPL-SCNC: 26 MMOL/L (ref 23–29)
CREAT SERPL-MCNC: 1.3 MG/DL (ref 0.5–1.4)
DIFFERENTIAL METHOD: ABNORMAL
EOSINOPHIL # BLD AUTO: 0.1 K/UL (ref 0–0.5)
EOSINOPHIL NFR BLD: 1.9 % (ref 0–8)
ERYTHROCYTE [DISTWIDTH] IN BLOOD BY AUTOMATED COUNT: 13 % (ref 11.5–14.5)
EST. GFR  (AFRICAN AMERICAN): >60 ML/MIN/1.73 M^2
EST. GFR  (NON AFRICAN AMERICAN): 58.1 ML/MIN/1.73 M^2
ESTIMATED AVG GLUCOSE: ABNORMAL MG/DL (ref 68–131)
GLUCOSE SERPL-MCNC: 326 MG/DL (ref 70–110)
HBA1C MFR BLD: >14 % (ref 4–5.6)
HCT VFR BLD AUTO: 40.8 % (ref 40–54)
HDLC SERPL-MCNC: 30 MG/DL (ref 40–75)
HDLC SERPL: 18.9 % (ref 20–50)
HGB BLD-MCNC: 12.6 G/DL (ref 14–18)
IMM GRANULOCYTES # BLD AUTO: 0.01 K/UL (ref 0–0.04)
IMM GRANULOCYTES NFR BLD AUTO: 0.3 % (ref 0–0.5)
LDLC SERPL CALC-MCNC: ABNORMAL MG/DL (ref 63–159)
LYMPHOCYTES # BLD AUTO: 1.8 K/UL (ref 1–4.8)
LYMPHOCYTES NFR BLD: 48.4 % (ref 18–48)
MCH RBC QN AUTO: 26.4 PG (ref 27–31)
MCHC RBC AUTO-ENTMCNC: 30.9 G/DL (ref 32–36)
MCV RBC AUTO: 85 FL (ref 82–98)
MONOCYTES # BLD AUTO: 0.4 K/UL (ref 0.3–1)
MONOCYTES NFR BLD: 9.6 % (ref 4–15)
NEUTROPHILS # BLD AUTO: 1.4 K/UL (ref 1.8–7.7)
NEUTROPHILS NFR BLD: 39 % (ref 38–73)
NONHDLC SERPL-MCNC: 129 MG/DL
NRBC BLD-RTO: 0 /100 WBC
PLATELET # BLD AUTO: 186 K/UL (ref 150–450)
PMV BLD AUTO: 11.8 FL (ref 9.2–12.9)
POTASSIUM SERPL-SCNC: 5.1 MMOL/L (ref 3.5–5.1)
PROT SERPL-MCNC: 8.1 G/DL (ref 6–8.4)
RBC # BLD AUTO: 4.78 M/UL (ref 4.6–6.2)
SODIUM SERPL-SCNC: 141 MMOL/L (ref 136–145)
TRIGL SERPL-MCNC: 614 MG/DL (ref 30–150)
TSH SERPL DL<=0.005 MIU/L-ACNC: 0.89 UIU/ML (ref 0.4–4)
WBC # BLD AUTO: 3.64 K/UL (ref 3.9–12.7)

## 2021-05-29 PROCEDURE — 84443 ASSAY THYROID STIM HORMONE: CPT | Performed by: INTERNAL MEDICINE

## 2021-05-29 PROCEDURE — 80053 COMPREHEN METABOLIC PANEL: CPT | Performed by: INTERNAL MEDICINE

## 2021-05-29 PROCEDURE — 80061 LIPID PANEL: CPT | Performed by: INTERNAL MEDICINE

## 2021-05-29 PROCEDURE — 83036 HEMOGLOBIN GLYCOSYLATED A1C: CPT | Performed by: INTERNAL MEDICINE

## 2021-05-29 PROCEDURE — 85025 COMPLETE CBC W/AUTO DIFF WBC: CPT | Performed by: INTERNAL MEDICINE

## 2021-05-29 PROCEDURE — 36415 COLL VENOUS BLD VENIPUNCTURE: CPT | Mod: PO | Performed by: INTERNAL MEDICINE

## 2021-05-31 ENCOUNTER — TELEPHONE (OUTPATIENT)
Dept: INTERNAL MEDICINE | Facility: CLINIC | Age: 66
End: 2021-05-31

## 2021-05-31 RX ORDER — FENOFIBRATE 54 MG/1
54 TABLET ORAL DAILY
Qty: 90 TABLET | Refills: 3 | Status: SHIPPED | OUTPATIENT
Start: 2021-05-31 | End: 2021-08-03

## 2021-06-09 ENCOUNTER — CLINICAL SUPPORT (OUTPATIENT)
Dept: INTERNAL MEDICINE | Facility: CLINIC | Age: 66
End: 2021-06-09
Payer: COMMERCIAL

## 2021-06-09 ENCOUNTER — OFFICE VISIT (OUTPATIENT)
Dept: INTERNAL MEDICINE | Facility: CLINIC | Age: 66
End: 2021-06-09
Payer: COMMERCIAL

## 2021-06-09 ENCOUNTER — LAB VISIT (OUTPATIENT)
Dept: LAB | Facility: HOSPITAL | Age: 66
End: 2021-06-09
Attending: INTERNAL MEDICINE
Payer: COMMERCIAL

## 2021-06-09 VITALS
WEIGHT: 212.5 LBS | HEIGHT: 68 IN | OXYGEN SATURATION: 98 % | HEART RATE: 74 BPM | SYSTOLIC BLOOD PRESSURE: 122 MMHG | DIASTOLIC BLOOD PRESSURE: 74 MMHG | BODY MASS INDEX: 32.21 KG/M2

## 2021-06-09 DIAGNOSIS — E11.69 HYPERLIPIDEMIA ASSOCIATED WITH TYPE 2 DIABETES MELLITUS: ICD-10-CM

## 2021-06-09 DIAGNOSIS — R74.8 ELEVATED ALKALINE PHOSPHATASE LEVEL: ICD-10-CM

## 2021-06-09 DIAGNOSIS — Z12.11 COLON CANCER SCREENING: ICD-10-CM

## 2021-06-09 DIAGNOSIS — Z00.00 ANNUAL PHYSICAL EXAM: Primary | ICD-10-CM

## 2021-06-09 DIAGNOSIS — E11.69 DIABETES MELLITUS TYPE 2 IN OBESE: ICD-10-CM

## 2021-06-09 DIAGNOSIS — I15.2 HYPERTENSION ASSOCIATED WITH DIABETES: ICD-10-CM

## 2021-06-09 DIAGNOSIS — B35.1 ONYCHOMYCOSIS: ICD-10-CM

## 2021-06-09 DIAGNOSIS — R76.8 HEPATITIS C ANTIBODY TEST POSITIVE: ICD-10-CM

## 2021-06-09 DIAGNOSIS — E11.65 UNCONTROLLED TYPE 2 DIABETES MELLITUS WITH HYPERGLYCEMIA: ICD-10-CM

## 2021-06-09 DIAGNOSIS — E66.9 DIABETES MELLITUS TYPE 2 IN OBESE: ICD-10-CM

## 2021-06-09 DIAGNOSIS — H61.23 EXCESSIVE CERUMEN IN BOTH EAR CANALS: ICD-10-CM

## 2021-06-09 DIAGNOSIS — E78.5 HYPERLIPIDEMIA ASSOCIATED WITH TYPE 2 DIABETES MELLITUS: ICD-10-CM

## 2021-06-09 DIAGNOSIS — E11.59 HYPERTENSION ASSOCIATED WITH DIABETES: ICD-10-CM

## 2021-06-09 LAB
25(OH)D3+25(OH)D2 SERPL-MCNC: 16 NG/ML (ref 30–96)
ALP SERPL-CCNC: 139 U/L (ref 55–135)
GGT SERPL-CCNC: 31 U/L (ref 8–55)

## 2021-06-09 PROCEDURE — 90472 IMMUNIZATION ADMIN EACH ADD: CPT | Mod: S$GLB,,, | Performed by: INTERNAL MEDICINE

## 2021-06-09 PROCEDURE — 82306 VITAMIN D 25 HYDROXY: CPT | Performed by: INTERNAL MEDICINE

## 2021-06-09 PROCEDURE — 99999 PR PBB SHADOW E&M-EST. PATIENT-LVL IV: ICD-10-PCS | Mod: PBBFAC,,, | Performed by: INTERNAL MEDICINE

## 2021-06-09 PROCEDURE — 84075 ASSAY ALKALINE PHOSPHATASE: CPT | Performed by: INTERNAL MEDICINE

## 2021-06-09 PROCEDURE — 90715 TDAP VACCINE 7 YRS/> IM: CPT | Mod: S$GLB,,, | Performed by: INTERNAL MEDICINE

## 2021-06-09 PROCEDURE — 90670 PCV13 VACCINE IM: CPT | Mod: S$GLB,,, | Performed by: INTERNAL MEDICINE

## 2021-06-09 PROCEDURE — 36415 COLL VENOUS BLD VENIPUNCTURE: CPT | Performed by: INTERNAL MEDICINE

## 2021-06-09 PROCEDURE — 90670 PNEUMOCOCCAL CONJUGATE VACCINE 13-VALENT LESS THAN 5YO & GREATER THAN: ICD-10-PCS | Mod: S$GLB,,, | Performed by: INTERNAL MEDICINE

## 2021-06-09 PROCEDURE — 99396 PR PREVENTIVE VISIT,EST,40-64: ICD-10-PCS | Mod: 25,S$GLB,, | Performed by: INTERNAL MEDICINE

## 2021-06-09 PROCEDURE — 90715 TDAP VACCINE GREATER THAN OR EQUAL TO 7YO IM: ICD-10-PCS | Mod: S$GLB,,, | Performed by: INTERNAL MEDICINE

## 2021-06-09 PROCEDURE — 99999 PR PBB SHADOW E&M-EST. PATIENT-LVL IV: CPT | Mod: PBBFAC,,, | Performed by: INTERNAL MEDICINE

## 2021-06-09 PROCEDURE — 83036 HEMOGLOBIN GLYCOSYLATED A1C: CPT | Performed by: INTERNAL MEDICINE

## 2021-06-09 PROCEDURE — 90471 TDAP VACCINE GREATER THAN OR EQUAL TO 7YO IM: ICD-10-PCS | Mod: S$GLB,,, | Performed by: INTERNAL MEDICINE

## 2021-06-09 PROCEDURE — 69210 PR REMOVAL IMPACTED CERUMEN REQUIRING INSTRUMENTATION, UNILATERAL: ICD-10-PCS | Mod: S$GLB,,, | Performed by: INTERNAL MEDICINE

## 2021-06-09 PROCEDURE — 82977 ASSAY OF GGT: CPT | Performed by: INTERNAL MEDICINE

## 2021-06-09 PROCEDURE — 90472 PNEUMOCOCCAL CONJUGATE VACCINE 13-VALENT LESS THAN 5YO & GREATER THAN: ICD-10-PCS | Mod: S$GLB,,, | Performed by: INTERNAL MEDICINE

## 2021-06-09 PROCEDURE — 99396 PREV VISIT EST AGE 40-64: CPT | Mod: 25,S$GLB,, | Performed by: INTERNAL MEDICINE

## 2021-06-09 PROCEDURE — 69210 REMOVE IMPACTED EAR WAX UNI: CPT | Mod: S$GLB,,, | Performed by: INTERNAL MEDICINE

## 2021-06-09 PROCEDURE — 87522 HEPATITIS C REVRS TRNSCRPJ: CPT | Performed by: INTERNAL MEDICINE

## 2021-06-09 PROCEDURE — 90471 IMMUNIZATION ADMIN: CPT | Mod: S$GLB,,, | Performed by: INTERNAL MEDICINE

## 2021-06-09 RX ORDER — CLOTRIMAZOLE AND BETAMETHASONE DIPROPIONATE 10; .64 MG/G; MG/G
CREAM TOPICAL 2 TIMES DAILY
Qty: 60 G | Refills: 3 | Status: SHIPPED | OUTPATIENT
Start: 2021-06-09 | End: 2022-05-31 | Stop reason: SDUPTHER

## 2021-06-09 RX ORDER — METFORMIN HYDROCHLORIDE 500 MG/1
TABLET, EXTENDED RELEASE ORAL
Qty: 360 TABLET | Refills: 3 | Status: SHIPPED | OUTPATIENT
Start: 2021-06-09 | End: 2022-05-31 | Stop reason: SDUPTHER

## 2021-06-09 RX ORDER — GLIPIZIDE 5 MG/1
5 TABLET ORAL 2 TIMES DAILY WITH MEALS
Qty: 180 TABLET | Refills: 3 | Status: SHIPPED | OUTPATIENT
Start: 2021-06-09 | End: 2022-05-31 | Stop reason: SDUPTHER

## 2021-06-09 RX ORDER — CLOTRIMAZOLE AND BETAMETHASONE DIPROPIONATE 10; .64 MG/G; MG/G
CREAM TOPICAL 2 TIMES DAILY
Qty: 45 G | Refills: 3 | Status: SHIPPED | OUTPATIENT
Start: 2021-06-09 | End: 2021-06-09 | Stop reason: SDUPTHER

## 2021-06-09 RX ORDER — INSULIN PUMP SYRINGE, 3 ML
EACH MISCELLANEOUS
Qty: 1 EACH | Refills: 0 | Status: SHIPPED | OUTPATIENT
Start: 2021-06-09 | End: 2022-06-16

## 2021-06-09 RX ORDER — LANCING DEVICE
1 EACH MISCELLANEOUS 2 TIMES DAILY WITH MEALS
Qty: 1 EACH | Refills: 0 | Status: SHIPPED | OUTPATIENT
Start: 2021-06-09 | End: 2022-06-16

## 2021-06-09 RX ORDER — LANCETS
1 EACH MISCELLANEOUS 2 TIMES DAILY WITH MEALS
Qty: 100 EACH | Refills: 11 | Status: SHIPPED | OUTPATIENT
Start: 2021-06-09 | End: 2022-06-16

## 2021-06-10 ENCOUNTER — TELEPHONE (OUTPATIENT)
Dept: INTERNAL MEDICINE | Facility: CLINIC | Age: 66
End: 2021-06-10

## 2021-06-10 DIAGNOSIS — E55.9 VITAMIN D DEFICIENCY: Primary | ICD-10-CM

## 2021-06-10 LAB
ESTIMATED AVG GLUCOSE: ABNORMAL MG/DL (ref 68–131)
HBA1C MFR BLD: >14 % (ref 4–5.6)

## 2021-06-10 RX ORDER — ERGOCALCIFEROL 1.25 MG/1
50000 CAPSULE ORAL
Qty: 12 CAPSULE | Refills: 3 | Status: SHIPPED | OUTPATIENT
Start: 2021-06-10 | End: 2022-07-07 | Stop reason: SDUPTHER

## 2021-06-11 ENCOUNTER — TELEPHONE (OUTPATIENT)
Dept: INTERNAL MEDICINE | Facility: CLINIC | Age: 66
End: 2021-06-11

## 2021-06-11 LAB — HEPATITIS C VIRUS (HCV) RNA DETECTION/QUANTIFICATION RT-PCR: NORMAL IU/ML

## 2021-07-07 ENCOUNTER — PATIENT MESSAGE (OUTPATIENT)
Dept: ADMINISTRATIVE | Facility: HOSPITAL | Age: 66
End: 2021-07-07

## 2021-07-28 ENCOUNTER — OFFICE VISIT (OUTPATIENT)
Dept: FAMILY MEDICINE | Facility: CLINIC | Age: 66
End: 2021-07-28
Payer: COMMERCIAL

## 2021-07-28 ENCOUNTER — TELEPHONE (OUTPATIENT)
Dept: INTERNAL MEDICINE | Facility: CLINIC | Age: 66
End: 2021-07-28

## 2021-07-28 ENCOUNTER — LAB VISIT (OUTPATIENT)
Dept: OBSTETRICS AND GYNECOLOGY | Facility: CLINIC | Age: 66
End: 2021-07-28
Payer: COMMERCIAL

## 2021-07-28 VITALS
BODY MASS INDEX: 32.34 KG/M2 | TEMPERATURE: 102 F | OXYGEN SATURATION: 96 % | HEART RATE: 91 BPM | WEIGHT: 213.38 LBS | DIASTOLIC BLOOD PRESSURE: 72 MMHG | HEIGHT: 68 IN | SYSTOLIC BLOOD PRESSURE: 142 MMHG

## 2021-07-28 DIAGNOSIS — R05.9 COUGH: ICD-10-CM

## 2021-07-28 DIAGNOSIS — R52 BODY ACHES: Primary | ICD-10-CM

## 2021-07-28 DIAGNOSIS — R05.9 COUGH: Primary | ICD-10-CM

## 2021-07-28 DIAGNOSIS — R19.7 DIARRHEA, UNSPECIFIED TYPE: ICD-10-CM

## 2021-07-28 DIAGNOSIS — I15.2 HYPERTENSION ASSOCIATED WITH DIABETES: ICD-10-CM

## 2021-07-28 DIAGNOSIS — E11.9 TYPE 2 DIABETES MELLITUS WITHOUT COMPLICATION, WITHOUT LONG-TERM CURRENT USE OF INSULIN: ICD-10-CM

## 2021-07-28 DIAGNOSIS — E11.59 HYPERTENSION ASSOCIATED WITH DIABETES: ICD-10-CM

## 2021-07-28 DIAGNOSIS — R50.9 FEVER, UNSPECIFIED FEVER CAUSE: ICD-10-CM

## 2021-07-28 PROCEDURE — U0003 INFECTIOUS AGENT DETECTION BY NUCLEIC ACID (DNA OR RNA); SEVERE ACUTE RESPIRATORY SYNDROME CORONAVIRUS 2 (SARS-COV-2) (CORONAVIRUS DISEASE [COVID-19]), AMPLIFIED PROBE TECHNIQUE, MAKING USE OF HIGH THROUGHPUT TECHNOLOGIES AS DESCRIBED BY CMS-2020-01-R: HCPCS | Performed by: INTERNAL MEDICINE

## 2021-07-28 PROCEDURE — 99214 PR OFFICE/OUTPT VISIT, EST, LEVL IV, 30-39 MIN: ICD-10-PCS | Mod: S$GLB,,, | Performed by: INTERNAL MEDICINE

## 2021-07-28 PROCEDURE — 99999 PR PBB SHADOW E&M-EST. PATIENT-LVL III: CPT | Mod: PBBFAC,,, | Performed by: INTERNAL MEDICINE

## 2021-07-28 PROCEDURE — U0005 INFEC AGEN DETEC AMPLI PROBE: HCPCS | Performed by: INTERNAL MEDICINE

## 2021-07-28 PROCEDURE — 99999 PR PBB SHADOW E&M-EST. PATIENT-LVL III: ICD-10-PCS | Mod: PBBFAC,,, | Performed by: INTERNAL MEDICINE

## 2021-07-28 PROCEDURE — 99214 OFFICE O/P EST MOD 30 MIN: CPT | Mod: S$GLB,,, | Performed by: INTERNAL MEDICINE

## 2021-07-28 RX ORDER — COVID-19 MOLECULAR TEST ASSAY
KIT MISCELLANEOUS
COMMUNITY
Start: 2021-07-01 | End: 2022-05-31

## 2021-07-30 ENCOUNTER — TELEPHONE (OUTPATIENT)
Dept: INTERNAL MEDICINE | Facility: CLINIC | Age: 66
End: 2021-07-30

## 2021-07-30 DIAGNOSIS — U07.1 LAB TEST POSITIVE FOR DETECTION OF COVID-19 VIRUS: Primary | ICD-10-CM

## 2021-07-30 DIAGNOSIS — U07.1 COVID-19 VIRUS INFECTION: Primary | ICD-10-CM

## 2021-07-30 LAB
SARS-COV-2 RNA RESP QL NAA+PROBE: DETECTED
SARS-COV-2- CYCLE NUMBER: 7.44

## 2021-07-30 RX ORDER — BENZONATATE 100 MG/1
100 CAPSULE ORAL 3 TIMES DAILY PRN
Qty: 30 CAPSULE | Refills: 0 | Status: SHIPPED | OUTPATIENT
Start: 2021-07-30 | End: 2021-08-09

## 2021-08-02 ENCOUNTER — TELEPHONE (OUTPATIENT)
Dept: INTERNAL MEDICINE | Facility: CLINIC | Age: 66
End: 2021-08-02

## 2021-08-03 ENCOUNTER — TELEPHONE (OUTPATIENT)
Dept: INTERNAL MEDICINE | Facility: CLINIC | Age: 66
End: 2021-08-03

## 2021-08-03 ENCOUNTER — OFFICE VISIT (OUTPATIENT)
Dept: INTERNAL MEDICINE | Facility: CLINIC | Age: 66
End: 2021-08-03
Payer: COMMERCIAL

## 2021-08-03 DIAGNOSIS — E11.59 HYPERTENSION ASSOCIATED WITH DIABETES: ICD-10-CM

## 2021-08-03 DIAGNOSIS — C61 PROSTATE CANCER: ICD-10-CM

## 2021-08-03 DIAGNOSIS — E78.5 HYPERLIPIDEMIA ASSOCIATED WITH TYPE 2 DIABETES MELLITUS: ICD-10-CM

## 2021-08-03 DIAGNOSIS — E11.9 TYPE 2 DIABETES MELLITUS WITHOUT COMPLICATION, WITHOUT LONG-TERM CURRENT USE OF INSULIN: Primary | ICD-10-CM

## 2021-08-03 DIAGNOSIS — I15.2 HYPERTENSION ASSOCIATED WITH DIABETES: ICD-10-CM

## 2021-08-03 DIAGNOSIS — E11.69 HYPERLIPIDEMIA ASSOCIATED WITH TYPE 2 DIABETES MELLITUS: ICD-10-CM

## 2021-08-03 PROBLEM — U07.1 LAB TEST POSITIVE FOR DETECTION OF COVID-19 VIRUS: Status: ACTIVE | Noted: 2021-08-03

## 2021-08-03 PROCEDURE — 99443 PR PHYSICIAN TELEPHONE EVALUATION 21-30 MIN: ICD-10-PCS | Mod: 95,,, | Performed by: NURSE PRACTITIONER

## 2021-08-03 PROCEDURE — 99443 PR PHYSICIAN TELEPHONE EVALUATION 21-30 MIN: CPT | Mod: 95,,, | Performed by: NURSE PRACTITIONER

## 2021-08-03 RX ORDER — DULAGLUTIDE 0.75 MG/.5ML
0.75 INJECTION, SOLUTION SUBCUTANEOUS
Qty: 4 PEN | Refills: 2 | Status: SHIPPED | OUTPATIENT
Start: 2021-08-03 | End: 2022-05-31

## 2021-08-07 NOTE — TELEPHONE ENCOUNTER
I spoke with patient and his wife, who stated patient has had a lot of drainage from julian drain site, I advised her to just keep redressing the site, patient hasn't had a bowel movement since surgery  But has been passing gas, and was taking miralax once daily, I advised her to have him increase miralax to every hour until he starts having bowel movements and then reduce frequency to maintain soft formed stool. Patient told to continue with liquids. Patient has been walking around the block. Patient advised to eat small frequent meals. Wife agreed.    99

## 2021-09-25 ENCOUNTER — LAB VISIT (OUTPATIENT)
Dept: LAB | Facility: HOSPITAL | Age: 66
End: 2021-09-25
Attending: UROLOGY
Payer: COMMERCIAL

## 2021-09-25 DIAGNOSIS — C61 PROSTATE CANCER: ICD-10-CM

## 2021-09-25 LAB — COMPLEXED PSA SERPL-MCNC: 0.05 NG/ML (ref 0–4)

## 2021-09-25 PROCEDURE — 84153 ASSAY OF PSA TOTAL: CPT | Performed by: UROLOGY

## 2021-09-25 PROCEDURE — 36415 COLL VENOUS BLD VENIPUNCTURE: CPT | Mod: PO | Performed by: UROLOGY

## 2021-09-28 ENCOUNTER — OFFICE VISIT (OUTPATIENT)
Dept: UROLOGY | Facility: CLINIC | Age: 66
End: 2021-09-28
Payer: COMMERCIAL

## 2021-09-28 VITALS
SYSTOLIC BLOOD PRESSURE: 152 MMHG | HEART RATE: 60 BPM | HEIGHT: 68 IN | BODY MASS INDEX: 32.28 KG/M2 | WEIGHT: 213 LBS | DIASTOLIC BLOOD PRESSURE: 88 MMHG

## 2021-09-28 DIAGNOSIS — C61 PROSTATE CANCER: Primary | ICD-10-CM

## 2021-09-28 PROCEDURE — 99213 OFFICE O/P EST LOW 20 MIN: CPT | Mod: S$GLB,,, | Performed by: UROLOGY

## 2021-09-28 PROCEDURE — 99213 PR OFFICE/OUTPT VISIT, EST, LEVL III, 20-29 MIN: ICD-10-PCS | Mod: S$GLB,,, | Performed by: UROLOGY

## 2021-09-28 PROCEDURE — 99999 PR PBB SHADOW E&M-EST. PATIENT-LVL III: ICD-10-PCS | Mod: PBBFAC,,, | Performed by: UROLOGY

## 2021-09-28 PROCEDURE — 99999 PR PBB SHADOW E&M-EST. PATIENT-LVL III: CPT | Mod: PBBFAC,,, | Performed by: UROLOGY

## 2022-03-22 DIAGNOSIS — E11.69 HYPERLIPIDEMIA ASSOCIATED WITH TYPE 2 DIABETES MELLITUS: ICD-10-CM

## 2022-03-22 DIAGNOSIS — E78.5 HYPERLIPIDEMIA ASSOCIATED WITH TYPE 2 DIABETES MELLITUS: ICD-10-CM

## 2022-03-22 RX ORDER — ATORVASTATIN CALCIUM 20 MG/1
TABLET, FILM COATED ORAL
Qty: 90 TABLET | Refills: 0 | Status: SHIPPED | OUTPATIENT
Start: 2022-03-22 | End: 2022-05-31 | Stop reason: SDUPTHER

## 2022-03-22 NOTE — TELEPHONE ENCOUNTER
Courtesy refill of meds. Please let pt know that as of 1/1/22, I am with [facility] and am no longer his/her PCP. Pt would have to schedule an est care appt with another provider to get further refills.    
32.6

## 2022-04-26 ENCOUNTER — TELEPHONE (OUTPATIENT)
Dept: PRIMARY CARE CLINIC | Facility: CLINIC | Age: 67
End: 2022-04-26
Payer: COMMERCIAL

## 2022-05-31 ENCOUNTER — LAB VISIT (OUTPATIENT)
Dept: LAB | Facility: HOSPITAL | Age: 67
End: 2022-05-31
Attending: INTERNAL MEDICINE
Payer: COMMERCIAL

## 2022-05-31 ENCOUNTER — TELEPHONE (OUTPATIENT)
Dept: INTERNAL MEDICINE | Facility: CLINIC | Age: 67
End: 2022-05-31
Payer: COMMERCIAL

## 2022-05-31 ENCOUNTER — OFFICE VISIT (OUTPATIENT)
Dept: INTERNAL MEDICINE | Facility: CLINIC | Age: 67
End: 2022-05-31
Payer: COMMERCIAL

## 2022-05-31 VITALS
HEIGHT: 68 IN | SYSTOLIC BLOOD PRESSURE: 120 MMHG | HEART RATE: 75 BPM | BODY MASS INDEX: 32.01 KG/M2 | WEIGHT: 211.19 LBS | DIASTOLIC BLOOD PRESSURE: 70 MMHG | OXYGEN SATURATION: 98 %

## 2022-05-31 DIAGNOSIS — E78.5 HYPERLIPIDEMIA ASSOCIATED WITH TYPE 2 DIABETES MELLITUS: ICD-10-CM

## 2022-05-31 DIAGNOSIS — E11.69 DIABETES MELLITUS TYPE 2 IN OBESE: ICD-10-CM

## 2022-05-31 DIAGNOSIS — E11.65 UNCONTROLLED TYPE 2 DIABETES MELLITUS WITH HYPERGLYCEMIA: Primary | ICD-10-CM

## 2022-05-31 DIAGNOSIS — Z12.11 COLON CANCER SCREENING: ICD-10-CM

## 2022-05-31 DIAGNOSIS — E11.69 HYPERLIPIDEMIA ASSOCIATED WITH TYPE 2 DIABETES MELLITUS: ICD-10-CM

## 2022-05-31 DIAGNOSIS — B35.3 TINEA PEDIS OF BOTH FEET: ICD-10-CM

## 2022-05-31 DIAGNOSIS — I15.2 HYPERTENSION ASSOCIATED WITH DIABETES: ICD-10-CM

## 2022-05-31 DIAGNOSIS — E55.9 VITAMIN D DEFICIENCY: ICD-10-CM

## 2022-05-31 DIAGNOSIS — R76.8 HEPATITIS C ANTIBODY TEST POSITIVE: ICD-10-CM

## 2022-05-31 DIAGNOSIS — E66.9 DIABETES MELLITUS TYPE 2 IN OBESE: ICD-10-CM

## 2022-05-31 DIAGNOSIS — B35.1 ONYCHOMYCOSIS: ICD-10-CM

## 2022-05-31 DIAGNOSIS — E11.65 UNCONTROLLED TYPE 2 DIABETES MELLITUS WITH HYPERGLYCEMIA: ICD-10-CM

## 2022-05-31 DIAGNOSIS — E11.59 HYPERTENSION ASSOCIATED WITH DIABETES: ICD-10-CM

## 2022-05-31 DIAGNOSIS — I10 ESSENTIAL HYPERTENSION: ICD-10-CM

## 2022-05-31 LAB
25(OH)D3+25(OH)D2 SERPL-MCNC: 16 NG/ML (ref 30–96)
ALBUMIN SERPL BCP-MCNC: 4.3 G/DL (ref 3.5–5.2)
ALBUMIN/CREAT UR: 9.7 UG/MG (ref 0–30)
ALP SERPL-CCNC: 129 U/L (ref 55–135)
ALT SERPL W/O P-5'-P-CCNC: 43 U/L (ref 10–44)
ANION GAP SERPL CALC-SCNC: 10 MMOL/L (ref 8–16)
AST SERPL-CCNC: 30 U/L (ref 10–40)
BASOPHILS # BLD AUTO: 0.03 K/UL (ref 0–0.2)
BASOPHILS NFR BLD: 0.8 % (ref 0–1.9)
BILIRUB SERPL-MCNC: 0.7 MG/DL (ref 0.1–1)
BUN SERPL-MCNC: 15 MG/DL (ref 8–23)
CALCIUM SERPL-MCNC: 9.9 MG/DL (ref 8.7–10.5)
CHLORIDE SERPL-SCNC: 99 MMOL/L (ref 95–110)
CHOLEST SERPL-MCNC: 147 MG/DL (ref 120–199)
CHOLEST/HDLC SERPL: 3.7 {RATIO} (ref 2–5)
CO2 SERPL-SCNC: 27 MMOL/L (ref 23–29)
CREAT SERPL-MCNC: 1.1 MG/DL (ref 0.5–1.4)
CREAT UR-MCNC: 144 MG/DL (ref 23–375)
DIFFERENTIAL METHOD: ABNORMAL
EOSINOPHIL # BLD AUTO: 0.1 K/UL (ref 0–0.5)
EOSINOPHIL NFR BLD: 1.4 % (ref 0–8)
ERYTHROCYTE [DISTWIDTH] IN BLOOD BY AUTOMATED COUNT: 12.9 % (ref 11.5–14.5)
EST. GFR  (AFRICAN AMERICAN): >60 ML/MIN/1.73 M^2
EST. GFR  (NON AFRICAN AMERICAN): >60 ML/MIN/1.73 M^2
ESTIMATED AVG GLUCOSE: 303 MG/DL (ref 68–131)
GLUCOSE SERPL-MCNC: 286 MG/DL (ref 70–110)
HBA1C MFR BLD: 12.2 % (ref 4–5.6)
HCT VFR BLD AUTO: 42.9 % (ref 40–54)
HDLC SERPL-MCNC: 40 MG/DL (ref 40–75)
HDLC SERPL: 27.2 % (ref 20–50)
HGB BLD-MCNC: 13.6 G/DL (ref 14–18)
IMM GRANULOCYTES # BLD AUTO: 0 K/UL (ref 0–0.04)
IMM GRANULOCYTES NFR BLD AUTO: 0 % (ref 0–0.5)
LDLC SERPL CALC-MCNC: 68.8 MG/DL (ref 63–159)
LYMPHOCYTES # BLD AUTO: 1.4 K/UL (ref 1–4.8)
LYMPHOCYTES NFR BLD: 39.4 % (ref 18–48)
MCH RBC QN AUTO: 26.8 PG (ref 27–31)
MCHC RBC AUTO-ENTMCNC: 31.7 G/DL (ref 32–36)
MCV RBC AUTO: 84 FL (ref 82–98)
MICROALBUMIN UR DL<=1MG/L-MCNC: 14 UG/ML
MONOCYTES # BLD AUTO: 0.3 K/UL (ref 0.3–1)
MONOCYTES NFR BLD: 9.6 % (ref 4–15)
NEUTROPHILS # BLD AUTO: 1.7 K/UL (ref 1.8–7.7)
NEUTROPHILS NFR BLD: 48.8 % (ref 38–73)
NONHDLC SERPL-MCNC: 107 MG/DL
NRBC BLD-RTO: 0 /100 WBC
PLATELET # BLD AUTO: 182 K/UL (ref 150–450)
PMV BLD AUTO: 11.4 FL (ref 9.2–12.9)
POTASSIUM SERPL-SCNC: 4.6 MMOL/L (ref 3.5–5.1)
PROT SERPL-MCNC: 8.4 G/DL (ref 6–8.4)
RBC # BLD AUTO: 5.08 M/UL (ref 4.6–6.2)
SODIUM SERPL-SCNC: 136 MMOL/L (ref 136–145)
TRIGL SERPL-MCNC: 191 MG/DL (ref 30–150)
TSH SERPL DL<=0.005 MIU/L-ACNC: 0.63 UIU/ML (ref 0.4–4)
WBC # BLD AUTO: 3.55 K/UL (ref 3.9–12.7)

## 2022-05-31 PROCEDURE — 83036 HEMOGLOBIN GLYCOSYLATED A1C: CPT | Performed by: INTERNAL MEDICINE

## 2022-05-31 PROCEDURE — 99396 PR PREVENTIVE VISIT,EST,40-64: ICD-10-PCS | Mod: S$GLB,,, | Performed by: INTERNAL MEDICINE

## 2022-05-31 PROCEDURE — 82306 VITAMIN D 25 HYDROXY: CPT | Performed by: INTERNAL MEDICINE

## 2022-05-31 PROCEDURE — 99999 PR PBB SHADOW E&M-EST. PATIENT-LVL IV: CPT | Mod: PBBFAC,,, | Performed by: INTERNAL MEDICINE

## 2022-05-31 PROCEDURE — 80061 LIPID PANEL: CPT | Performed by: INTERNAL MEDICINE

## 2022-05-31 PROCEDURE — 99396 PREV VISIT EST AGE 40-64: CPT | Mod: S$GLB,,, | Performed by: INTERNAL MEDICINE

## 2022-05-31 PROCEDURE — 82570 ASSAY OF URINE CREATININE: CPT | Performed by: INTERNAL MEDICINE

## 2022-05-31 PROCEDURE — 85025 COMPLETE CBC W/AUTO DIFF WBC: CPT | Performed by: INTERNAL MEDICINE

## 2022-05-31 PROCEDURE — 36415 COLL VENOUS BLD VENIPUNCTURE: CPT | Performed by: INTERNAL MEDICINE

## 2022-05-31 PROCEDURE — 80053 COMPREHEN METABOLIC PANEL: CPT | Performed by: INTERNAL MEDICINE

## 2022-05-31 PROCEDURE — 82043 UR ALBUMIN QUANTITATIVE: CPT | Performed by: INTERNAL MEDICINE

## 2022-05-31 PROCEDURE — 99999 PR PBB SHADOW E&M-EST. PATIENT-LVL IV: ICD-10-PCS | Mod: PBBFAC,,, | Performed by: INTERNAL MEDICINE

## 2022-05-31 PROCEDURE — 84443 ASSAY THYROID STIM HORMONE: CPT | Performed by: INTERNAL MEDICINE

## 2022-05-31 RX ORDER — PEN NEEDLE, DIABETIC 33 GX5/32"
1 NEEDLE, DISPOSABLE MISCELLANEOUS NIGHTLY
Qty: 100 EACH | Refills: 3 | Status: SHIPPED | OUTPATIENT
Start: 2022-05-31 | End: 2022-06-16

## 2022-05-31 RX ORDER — METFORMIN HYDROCHLORIDE 500 MG/1
TABLET, EXTENDED RELEASE ORAL
Qty: 360 TABLET | Refills: 3 | Status: SHIPPED | OUTPATIENT
Start: 2022-05-31 | End: 2022-06-16 | Stop reason: SDUPTHER

## 2022-05-31 RX ORDER — GLIPIZIDE 5 MG/1
5 TABLET ORAL 2 TIMES DAILY WITH MEALS
Qty: 180 TABLET | Refills: 3 | Status: SHIPPED | OUTPATIENT
Start: 2022-05-31 | End: 2022-06-16 | Stop reason: SDUPTHER

## 2022-05-31 RX ORDER — CLOTRIMAZOLE AND BETAMETHASONE DIPROPIONATE 10; .64 MG/G; MG/G
CREAM TOPICAL 2 TIMES DAILY
Qty: 60 G | Refills: 3 | Status: SHIPPED | OUTPATIENT
Start: 2022-05-31 | End: 2023-06-06

## 2022-05-31 RX ORDER — LOSARTAN POTASSIUM 50 MG/1
50 TABLET ORAL DAILY
Qty: 90 TABLET | Refills: 3 | Status: SHIPPED | OUTPATIENT
Start: 2022-05-31 | End: 2023-08-13

## 2022-05-31 RX ORDER — PRENATAL VIT 91/IRON/FOLIC/DHA 28-975-200
COMBINATION PACKAGE (EA) ORAL 2 TIMES DAILY
Qty: 28.4 G | Refills: 5 | Status: SHIPPED | OUTPATIENT
Start: 2022-05-31 | End: 2022-05-31 | Stop reason: CLARIF

## 2022-05-31 RX ORDER — INSULIN DETEMIR 100 [IU]/ML
10 INJECTION, SOLUTION SUBCUTANEOUS NIGHTLY
Qty: 15 ML | Refills: 1 | Status: SHIPPED | OUTPATIENT
Start: 2022-05-31 | End: 2022-06-16

## 2022-05-31 RX ORDER — ATORVASTATIN CALCIUM 20 MG/1
20 TABLET, FILM COATED ORAL DAILY
Qty: 90 TABLET | Refills: 3 | Status: SHIPPED | OUTPATIENT
Start: 2022-05-31 | End: 2023-10-09 | Stop reason: SDUPTHER

## 2022-05-31 NOTE — PATIENT INSTRUCTIONS
Shingles vaccination today (Shingrix, two shot series, 2-6 months apart)    Fasting labwork today    Urine sample today    Schedule optometry appointment    Prescriptions have been refilled to your [facility] pharmacy.     Restart metformin 1000 mg (2x500) twice a day with meals    You need to check your sugars twice a day. Goal blood sugar for now is 120-160, ultimate goal is much less. (<120)    Prescription for insulin levemir 10 units nightly placed; Pen needles placed. These have been sent to your pharmacy. Will schedule you for appointment with Wally Dorantes for insulin teaching. Do not start until appointment and proper instruction on use.     Colonoscopy - a colonoscopy has been ordered for you. In order to schedule this appointment, please call [phone].     Return to clinic in 3 months or sooner if needed.

## 2022-05-31 NOTE — Clinical Note
Patient uncontrolled a1c >14.0; never filled trulicity. Needs to start insulin, prescription for levemir placed, would appreciate f/u and insulin training

## 2022-05-31 NOTE — PROGRESS NOTES
Subjective:       Patient ID: Sanchez Purcell is a 66 y.o. male.    Chief Complaint: Annual Exam and Establish Care      HPI  Sanchez Purcell is a 66 y.o. year old male with history of HTN, HLD, uncontrolled t2DM (last a1c >14.0, over 11 months ago, lost to follow up), history of prostate cancer presents for establishment of care. Reports recurrence of athlete's foot.    Prostate CA 2017 s/p RALP 2/2017.  Follows w/ Dr. Chavez     Review of Systems   Constitutional: Negative for activity change, appetite change, chills, fatigue, fever and unexpected weight change.   HENT: Negative for congestion, rhinorrhea and sore throat.    Eyes: Negative for visual disturbance.   Respiratory: Negative for shortness of breath.    Cardiovascular: Negative for chest pain.   Gastrointestinal: Negative for abdominal pain, diarrhea, nausea and vomiting.   Genitourinary: Negative for difficulty urinating and dysuria.   Musculoskeletal: Negative for arthralgias, back pain and myalgias.   Skin: Negative for color change and rash.   Neurological: Negative for dizziness, weakness and headaches.         Past Medical History:   Diagnosis Date    Diabetes mellitus type II     Heart murmur 1990's    Hyperlipidemia     Hypertension     Prostate cancer         Prior to Admission medications    Medication Sig Start Date End Date Taking? Authorizing Provider   atorvastatin (LIPITOR) 20 MG tablet TAKE 1 TABLET(20 MG) BY MOUTH EVERY DAY 3/22/22  Yes Perri King MD   blood sugar diagnostic Strp 1 strip by Misc.(Non-Drug; Combo Route) route 2 (two) times daily with meals. 6/9/21  Yes Perri King MD   blood-glucose meter kit Use as instructed 6/9/21 6/9/22 Yes Perri King MD   clotrimazole-betamethasone 1-0.05% (LOTRISONE) cream Apply topically 2 (two) times daily. 6/9/21  Yes Perri King MD   ergocalciferol (ERGOCALCIFEROL) 50,000 unit Cap Take 1 capsule (50,000 Units total) by mouth every 7 days. 6/10/21  Yes Perri King MD   glipiZIDE (GLUCOTROL) 5 MG tablet  "Take 1 tablet (5 mg total) by mouth 2 (two) times daily with meals. 6/9/21 6/9/22 Yes Perri King MD   lancets Misc 1 lancet by Misc.(Non-Drug; Combo Route) route 2 (two) times daily with meals. 6/9/21  Yes Perri King MD   lancing device Misc 1 Device by Misc.(Non-Drug; Combo Route) route 2 (two) times daily with meals. 6/9/21 6/9/22 Yes Perri King MD   losartan (COZAAR) 50 MG tablet TAKE 1 TABLET(50 MG) BY MOUTH EVERY DAY 2/22/22  Yes Perri King MD   dulaglutide (TRULICITY) 0.75 mg/0.5 mL pen injector Inject 0.75 mg into the skin every 7 days.  Patient not taking: Reported on 5/31/2022 8/3/21   SHARLENE Khan, FNP   metFORMIN (GLUCOPHAGE-XR) 500 MG ER 24hr tablet TAKE 2 TABLETS(1000 MG) BY MOUTH TWICE DAILY.  Patient not taking: Reported on 5/31/2022 6/9/21   Perri King MD   ID NOW COVID-19 TEST KIT Kit TEST AS DIRECTED 7/1/21 5/31/22  Historical Provider        Past medical history, surgical history, and family medical history reviewed and updated as appropriate.    Medications and allergies reviewed.     Objective:          Vitals:    05/31/22 0907   BP: 120/70   BP Location: Right arm   Patient Position: Sitting   BP Method: Large (Manual)   Pulse: 75   SpO2: 98%   Weight: 95.8 kg (211 lb 3.2 oz)   Height: 5' 8" (1.727 m)     Body mass index is 32.11 kg/m².  Physical Exam  Constitutional:       General: He is not in acute distress.     Appearance: He is well-developed.   HENT:      Head: Normocephalic and atraumatic.      Nose: Nose normal.   Eyes:      General: No scleral icterus.     Extraocular Movements: Extraocular movements intact.   Neck:      Thyroid: No thyromegaly.      Vascular: No JVD.      Trachea: No tracheal deviation.   Cardiovascular:      Rate and Rhythm: Normal rate and regular rhythm.      Heart sounds: Normal heart sounds. No murmur heard.    No friction rub. No gallop.   Pulmonary:      Effort: Pulmonary effort is normal. No respiratory distress.      Breath sounds: Normal breath sounds. No " wheezing or rales.   Abdominal:      General: Bowel sounds are normal. There is no distension.      Palpations: Abdomen is soft. There is no mass.      Tenderness: There is no abdominal tenderness.   Musculoskeletal:         General: No tenderness. Normal range of motion.      Cervical back: Normal range of motion and neck supple.   Lymphadenopathy:      Cervical: No cervical adenopathy.   Skin:     General: Skin is warm and dry.      Findings: No rash.   Neurological:      Mental Status: He is alert and oriented to person, place, and time.      Cranial Nerves: No cranial nerve deficit.      Deep Tendon Reflexes: Reflexes normal.   Psychiatric:         Behavior: Behavior normal.         Lab Results   Component Value Date    WBC 3.64 (L) 05/29/2021    HGB 12.6 (L) 05/29/2021    HCT 40.8 05/29/2021     05/29/2021    CHOL 159 05/29/2021    TRIG 614 (H) 05/29/2021    HDL 30 (L) 05/29/2021    ALT 32 05/29/2021    AST 28 05/29/2021     05/29/2021    K 5.1 05/29/2021     05/29/2021    CREATININE 1.3 05/29/2021    BUN 12 05/29/2021    CO2 26 05/29/2021    TSH 0.886 05/29/2021    PSA 7.2 (H) 07/29/2016    HGBA1C >14.0 (H) 06/09/2021       Assessment:       1. Uncontrolled type 2 diabetes mellitus with hyperglycemia    2. Vitamin D deficiency    3. Hyperlipidemia associated with type 2 diabetes mellitus    4. Hypertension associated with diabetes    5. Onychomycosis    6. Hepatitis C antibody test positive    7. Tinea pedis of both feet    8. Diabetes mellitus type 2 in obese    9. Essential hypertension    10. Colon cancer screening          Plan:     Sanchez was seen today for annual exam and establish care.    Diagnoses and all orders for this visit:    Uncontrolled type 2 diabetes mellitus with hyperglycemia  -     Hemoglobin A1C; Future  -     insulin detemir U-100 (LEVEMIR FLEXTOUCH U-100 INSULN) 100 unit/mL (3 mL) InPn pen; Inject 10 Units into the skin every evening.  -     pen needle, diabetic  "(MICRODOT INSULIN PEN NEEDLE) 33 gauge x 5/32" Ndle; 1 Stick by Misc.(Non-Drug; Combo Route) route every evening.  -     MICROALBUMIN / CREATININE RATIO URINE  -     Ambulatory referral/consult to Optometry; Future  -     glipiZIDE (GLUCOTROL) 5 MG tablet; Take 1 tablet (5 mg total) by mouth 2 (two) times daily with meals.    Vitamin D deficiency  -     Vitamin D; Future    Hyperlipidemia associated with type 2 diabetes mellitus  -     Lipid Panel; Future  -     atorvastatin (LIPITOR) 20 MG tablet; Take 1 tablet (20 mg total) by mouth once daily.    Hypertension associated with diabetes  -     CBC Auto Differential; Future  -     Comprehensive Metabolic Panel; Future  -     TSH; Future    Onychomycosis  -     clotrimazole-betamethasone 1-0.05% (LOTRISONE) cream; Apply topically 2 (two) times daily.    Hepatitis C antibody test positive    Tinea pedis of both feet  -     clotrimazole-betamethasone 1-0.05% (LOTRISONE) cream; Apply topically 2 (two) times daily.    Diabetes mellitus type 2 in obese  -     metFORMIN (GLUCOPHAGE-XR) 500 MG ER 24hr tablet; TAKE 2 TABLETS(1000 MG) BY MOUTH TWICE DAILY.  -     glipiZIDE (GLUCOTROL) 5 MG tablet; Take 1 tablet (5 mg total) by mouth 2 (two) times daily with meals.    Essential hypertension  -     losartan (COZAAR) 50 MG tablet; Take 1 tablet (50 mg total) by mouth once daily.    Colon cancer screening  -     Case Request Endoscopy: COLONOSCOPY    t2dm - uncontrolled, recheck a1c, reordered metformin, glipizide, levemir; needs to f/u with diabetes JULIO Dorantes  HTN - uncontrolled, restarted losartan 50 mg daily  HLD - recheck lipid panel, restart lipitor 20   Due for colonoscopy  Tinea pedis - restart lotrisone    Health maintenance reviewed with patient.     Follow up in about 3 months (around 8/31/2022).    Jamin Anthony MD  Internal Medicine / Primary Care  [facility]  5/31/2022    "

## 2022-06-07 ENCOUNTER — OFFICE VISIT (OUTPATIENT)
Dept: OPTOMETRY | Facility: CLINIC | Age: 67
End: 2022-06-07
Payer: COMMERCIAL

## 2022-06-07 DIAGNOSIS — Z79.4 TYPE 2 DIABETES MELLITUS WITH DIABETIC CATARACT, WITH LONG-TERM CURRENT USE OF INSULIN: Primary | ICD-10-CM

## 2022-06-07 DIAGNOSIS — E11.36 TYPE 2 DIABETES MELLITUS WITH DIABETIC CATARACT, WITH LONG-TERM CURRENT USE OF INSULIN: Primary | ICD-10-CM

## 2022-06-07 DIAGNOSIS — H52.7 REFRACTIVE ERROR: ICD-10-CM

## 2022-06-07 DIAGNOSIS — H25.13 NUCLEAR SCLEROSIS OF BOTH EYES: ICD-10-CM

## 2022-06-07 PROCEDURE — 99999 PR PBB SHADOW E&M-EST. PATIENT-LVL III: ICD-10-PCS | Mod: PBBFAC,,, | Performed by: OPTOMETRIST

## 2022-06-07 PROCEDURE — 99999 PR PBB SHADOW E&M-EST. PATIENT-LVL III: CPT | Mod: PBBFAC,,, | Performed by: OPTOMETRIST

## 2022-06-07 PROCEDURE — 92014 PR EYE EXAM, EST PATIENT,COMPREHESV: ICD-10-PCS | Mod: S$GLB,,, | Performed by: OPTOMETRIST

## 2022-06-07 PROCEDURE — 92015 PR REFRACTION: ICD-10-PCS | Mod: S$GLB,,, | Performed by: OPTOMETRIST

## 2022-06-07 PROCEDURE — 92015 DETERMINE REFRACTIVE STATE: CPT | Mod: S$GLB,,, | Performed by: OPTOMETRIST

## 2022-06-07 PROCEDURE — 92014 COMPRE OPH EXAM EST PT 1/>: CPT | Mod: S$GLB,,, | Performed by: OPTOMETRIST

## 2022-06-07 NOTE — PROGRESS NOTES
Subjective:       Patient ID: Sanchez Purcell is a 66 y.o. male      Chief Complaint   Patient presents with    Concerns About Ocular Health    Diabetic Eye Exam     History of Present Illness  Dls: 2/14/20 Dr. Moreno     67 y/o male presents today for diabetic eye exam.  Pt states no changes in vision. Pt wears otc readers.     LBS ?     No tearing  No itching   No burning  No pain  No ha's  + floaters   No flashes    Eye meds  None     Hemoglobin A1C       Date                     Value               Ref Range             Status                05/31/2022               12.2 (H)            4.0 - 5.6 %           Final                  06/09/2021               >14.0 (H)           4.0 - 5.6 %           Final                 05/29/2021               >14.0 (H)           4.0 - 5.6 %           Final                  Assessment/Plan:     1. Type 2 diabetes mellitus with diabetic cataract, with long-term current use of insulin  No diabetic retinopathy. Discussed with pt the effects of diabetes on vision, importance of good blood sugar control, compliance with meds, and follow up care with PCP. Return in 1 year for dilated eye exam, sooner PRN.      2. Nuclear sclerosis of both eyes  Educated pt on presence of cataracts and effects on vision. No surgery at this time. Recheck in one year, sooner PRN.    3. Refractive error  Educated patient on refractive error and discussed lens options. Dispensed updated spectacle Rx. Educated about adaptation period to new specs.    Eyeglass Final Rx     Eyeglass Final Rx       Sphere Cylinder Axis Add    Right -1.75 +0.50 180 +2.50    Left -1.25 Sphere  +2.50    Expiration Date: 6/7/2023                  Follow up in about 1 year (around 6/7/2023) for Diabetic Eye Exam.

## 2022-06-15 NOTE — PROGRESS NOTES
CHIEF COMPLAINT: Type 2 Diabetes     HPI: Mr. Sanchez Purcell is a 66 y.o. male who was diagnosed with Type 2 DM > 12 years ago.   Worked for [employer].   poct glucose today: 321 mg/dl   a1c elevated  Admits to eating fried foods at times, higher carbs , daiquiri on Thursday nights.    Lab Results   Component Value Date    HGBA1C 12.2 (H) 05/31/2022     PREVIOUS DIABETES MEDICATIONS TRIED  Metformin  Glipizide   levemir- not started yet    CURRENT DIABETIC MEDS: metformin 1000 mg twice a day , glipizide 5 mg twice a day w/ meals     Diabetes Management Status    Statin: Taking  ACE/ARB: Taking    Screening or Prevention Patient's value Goal Complete/Controlled?   HgA1C Testing and Control   Lab Results   Component Value Date    HGBA1C 12.2 (H) 05/31/2022      Annually/Less than 8% No   Lipid profile : 05/31/2022 Annually Yes   LDL control Lab Results   Component Value Date    LDLCALC 68.8 05/31/2022    Annually/Less than 100 mg/dl  Yes   Nephropathy screening Lab Results   Component Value Date    LABMICR 14.0 05/31/2022     Lab Results   Component Value Date    PROTEINUA Negative 05/24/2010    Annually Yes   Blood pressure BP Readings from Last 1 Encounters:   05/31/22 120/70    Less than 140/90 Yes   Dilated retinal exam : 06/07/2022 Annually Yes   Foot exam   : 06/09/2021 Annually No     REVIEW OF SYSTEMS  General: no weakness, fatigue, weight stable.   Eyes: no double or blurred vision, eye pain, or redness  Cardiovascular: no chest pain, palpitations, edema, or murmurs.   Respiratory: no cough or dyspnea.   GI: no heartburn, nausea, or changes in bowel patterns; good appetite.   Skin: no rashes, dryness, itching, or reactions at insulin injection sites.  Neuro: no numbness, tingling, tremors, or vertigo.   Endocrine: no polyuria, polydipsia, polyphagia, heat or cold intolerance.     Vital Signs  BP (!) 148/78 (BP Location: Left arm, Patient Position: Sitting, BP Method: Medium (Manual))   Pulse 86   Ht 5'  "8" (1.727 m)   Wt 97.5 kg (214 lb 15.2 oz)   SpO2 98%   BMI 32.68 kg/m²     Hemoglobin A1C   Date Value Ref Range Status   05/31/2022 12.2 (H) 4.0 - 5.6 % Final     Comment:     ADA Screening Guidelines:  5.7-6.4%  Consistent with prediabetes  >or=6.5%  Consistent with diabetes    High levels of fetal hemoglobin interfere with the HbA1C  assay. Heterozygous hemoglobin variants (HbS, HgC, etc)do  not significantly interfere with this assay.   However, presence of multiple variants may affect accuracy.     06/09/2021 >14.0 (H) 4.0 - 5.6 % Final     Comment:     ADA Screening Guidelines:  5.7-6.4%  Consistent with prediabetes  >or=6.5%  Consistent with diabetes    High levels of fetal hemoglobin interfere with the HbA1C  assay. Heterozygous hemoglobin variants (HbS, HgC, etc)do  not significantly interfere with this assay.   However, presence of multiple variants may affect accuracy.     05/29/2021 >14.0 (H) 4.0 - 5.6 % Final     Comment:     ADA Screening Guidelines:  5.7-6.4%  Consistent with prediabetes  >or=6.5%  Consistent with diabetes    High levels of fetal hemoglobin interfere with the HbA1C  assay. Heterozygous hemoglobin variants (HbS, HgC, etc)do  not significantly interfere with this assay.   However, presence of multiple variants may affect accuracy.          Chemistry        Component Value Date/Time     05/31/2022 1032    K 4.6 05/31/2022 1032    CL 99 05/31/2022 1032    CO2 27 05/31/2022 1032    BUN 15 05/31/2022 1032    CREATININE 1.1 05/31/2022 1032     (H) 05/31/2022 1032        Component Value Date/Time    CALCIUM 9.9 05/31/2022 1032    ALKPHOS 129 05/31/2022 1032    AST 30 05/31/2022 1032    ALT 43 05/31/2022 1032    BILITOT 0.7 05/31/2022 1032    ESTGFRAFRICA >60.0 05/31/2022 1032    EGFRNONAA >60.0 05/31/2022 1032           Lab Results   Component Value Date    TSH 0.633 05/31/2022      Lab Results   Component Value Date    CHOL 147 05/31/2022    CHOL 159 05/29/2021    CHOL 195 " 01/24/2020     Lab Results   Component Value Date    HDL 40 05/31/2022    HDL 30 (L) 05/29/2021    HDL 43 01/24/2020     Lab Results   Component Value Date    LDLCALC 68.8 05/31/2022    LDLCALC Invalid, Trig>400.0 05/29/2021    LDLCALC 118.2 01/24/2020     Lab Results   Component Value Date    TRIG 191 (H) 05/31/2022    TRIG 614 (H) 05/29/2021    TRIG 169 (H) 01/24/2020     Lab Results   Component Value Date    CHOLHDL 27.2 05/31/2022    CHOLHDL 18.9 (L) 05/29/2021    CHOLHDL 22.1 01/24/2020         PHYSICAL EXAMINATION  Constitutional: Appears well, no distress..  Reviewed vitals above.  Eyes: conjunctivae & lids intact; PERRLA, EOMs intact; optic discs   Neck: Supple, trachea midline.   Respiratory: No wheezes, even and unlabored, upon palpation wnl, percussion wnl  Cardiovascular: RRR;  no edema or varicosities  Lymph: deferred   Skin: warm and dry; no injection site reactions, no acanthosis nigracans observed.  Neuro:patient alert and cooperative, normal affect; steady gait.  Psychiatric: judgement & insight intact, orientation of time, place & person intact, memory; mood & affect wnl     Diabetes Foot Exam:   Deferred     Assessment/Plan  1. Type 2 diabetes mellitus without complication, without long-term current use of insulin  Hemoglobin A1C    POCT Glucose, Hand-Held Device    Hemoglobin A1C   2. Prostate cancer     3. Hyperlipidemia associated with type 2 diabetes mellitus     4. Hypertension associated with diabetes     5. Uncontrolled type 2 diabetes mellitus with hyperglycemia  glipiZIDE (GLUCOTROL) 5 MG tablet    insulin detemir U-100 (LEVEMIR FLEXTOUCH U-100 INSULN) 100 unit/mL (3 mL) InPn pen   6. Diabetes mellitus type 2 in obese  metFORMIN (GLUCOPHAGE-XR) 500 MG ER 24hr tablet    glipiZIDE (GLUCOTROL) 5 MG tablet     1. Follow up in 6 months w/ me   a1c in 3 months   a1c in 6 months   a1c goal less than 7%   poct glucose 321 mg/dl   Demo -levemir 16 units at night   Instructions printed out for  insulin pen use  Continue glipizide 5 mg bid w/ 2 meals  Metformin 1000 mg twice a day   Carb book given   New meter, strips, lancets   2. F/u with urology   3.   Lab Results   Component Value Date    LDLCALC 68.8 05/31/2022     At goal   4. Continue med(s)  Controlled  5. See above   6. See above     FOLLOW UP  In 6 months

## 2022-06-16 ENCOUNTER — OFFICE VISIT (OUTPATIENT)
Dept: INTERNAL MEDICINE | Facility: CLINIC | Age: 67
End: 2022-06-16
Payer: COMMERCIAL

## 2022-06-16 VITALS
WEIGHT: 214.94 LBS | DIASTOLIC BLOOD PRESSURE: 78 MMHG | OXYGEN SATURATION: 98 % | HEIGHT: 68 IN | SYSTOLIC BLOOD PRESSURE: 148 MMHG | BODY MASS INDEX: 32.58 KG/M2 | HEART RATE: 86 BPM

## 2022-06-16 DIAGNOSIS — E11.65 UNCONTROLLED TYPE 2 DIABETES MELLITUS WITH HYPERGLYCEMIA: ICD-10-CM

## 2022-06-16 DIAGNOSIS — E11.59 HYPERTENSION ASSOCIATED WITH DIABETES: ICD-10-CM

## 2022-06-16 DIAGNOSIS — E11.69 HYPERLIPIDEMIA ASSOCIATED WITH TYPE 2 DIABETES MELLITUS: ICD-10-CM

## 2022-06-16 DIAGNOSIS — E66.9 DIABETES MELLITUS TYPE 2 IN OBESE: ICD-10-CM

## 2022-06-16 DIAGNOSIS — E11.69 DIABETES MELLITUS TYPE 2 IN OBESE: ICD-10-CM

## 2022-06-16 DIAGNOSIS — C61 PROSTATE CANCER: ICD-10-CM

## 2022-06-16 DIAGNOSIS — E11.9 TYPE 2 DIABETES MELLITUS WITHOUT COMPLICATION, WITHOUT LONG-TERM CURRENT USE OF INSULIN: Primary | ICD-10-CM

## 2022-06-16 DIAGNOSIS — I15.2 HYPERTENSION ASSOCIATED WITH DIABETES: ICD-10-CM

## 2022-06-16 DIAGNOSIS — E78.5 HYPERLIPIDEMIA ASSOCIATED WITH TYPE 2 DIABETES MELLITUS: ICD-10-CM

## 2022-06-16 LAB — GLUCOSE SERPL-MCNC: 321 MG/DL (ref 70–110)

## 2022-06-16 PROCEDURE — 82962 POCT GLUCOSE, HAND-HELD DEVICE: ICD-10-PCS | Mod: S$GLB,,, | Performed by: NURSE PRACTITIONER

## 2022-06-16 PROCEDURE — 99999 PR PBB SHADOW E&M-EST. PATIENT-LVL IV: ICD-10-PCS | Mod: PBBFAC,,, | Performed by: NURSE PRACTITIONER

## 2022-06-16 PROCEDURE — 99214 PR OFFICE/OUTPT VISIT, EST, LEVL IV, 30-39 MIN: ICD-10-PCS | Mod: S$GLB,,, | Performed by: NURSE PRACTITIONER

## 2022-06-16 PROCEDURE — 99999 PR PBB SHADOW E&M-EST. PATIENT-LVL IV: CPT | Mod: PBBFAC,,, | Performed by: NURSE PRACTITIONER

## 2022-06-16 PROCEDURE — 99499 UNLISTED E&M SERVICE: CPT | Mod: S$GLB,,, | Performed by: NURSE PRACTITIONER

## 2022-06-16 PROCEDURE — 99214 OFFICE O/P EST MOD 30 MIN: CPT | Mod: S$GLB,,, | Performed by: NURSE PRACTITIONER

## 2022-06-16 PROCEDURE — 82962 GLUCOSE BLOOD TEST: CPT | Mod: S$GLB,,, | Performed by: NURSE PRACTITIONER

## 2022-06-16 PROCEDURE — 99499 RISK ADDL DX/OHS AUDIT: ICD-10-PCS | Mod: S$GLB,,, | Performed by: NURSE PRACTITIONER

## 2022-06-16 RX ORDER — INSULIN PUMP SYRINGE, 3 ML
EACH MISCELLANEOUS
Qty: 1 EACH | Refills: 0 | Status: SHIPPED | OUTPATIENT
Start: 2022-06-16 | End: 2023-01-11

## 2022-06-16 RX ORDER — GLIPIZIDE 5 MG/1
5 TABLET ORAL 2 TIMES DAILY WITH MEALS
Qty: 180 TABLET | Refills: 3 | Status: SHIPPED | OUTPATIENT
Start: 2022-06-16 | End: 2023-01-11

## 2022-06-16 RX ORDER — GLIPIZIDE 5 MG/1
5 TABLET ORAL 2 TIMES DAILY WITH MEALS
Qty: 180 TABLET | Refills: 3 | Status: SHIPPED | OUTPATIENT
Start: 2022-06-16 | End: 2022-06-16 | Stop reason: SDUPTHER

## 2022-06-16 RX ORDER — PEN NEEDLE, DIABETIC 30 GX3/16"
NEEDLE, DISPOSABLE MISCELLANEOUS
Qty: 100 EACH | Refills: 3 | Status: SHIPPED | OUTPATIENT
Start: 2022-06-16 | End: 2023-01-11

## 2022-06-16 RX ORDER — INSULIN DETEMIR 100 [IU]/ML
INJECTION, SOLUTION SUBCUTANEOUS
Qty: 15 ML | Refills: 4 | Status: SHIPPED | OUTPATIENT
Start: 2022-06-16 | End: 2023-01-11

## 2022-06-16 RX ORDER — LANCETS
EACH MISCELLANEOUS
Qty: 300 EACH | Refills: 3 | Status: SHIPPED | OUTPATIENT
Start: 2022-06-16 | End: 2023-01-11

## 2022-06-16 RX ORDER — METFORMIN HYDROCHLORIDE 500 MG/1
TABLET, EXTENDED RELEASE ORAL
Qty: 360 TABLET | Refills: 3 | Status: SHIPPED | OUTPATIENT
Start: 2022-06-16 | End: 2023-06-07

## 2022-06-16 NOTE — PATIENT INSTRUCTIONS
Fasting/am sugar levels < 130    Start levemir 16 units at night -same time each night  Metformin 1000 mg twice a day   Glipizide 5 mg 15 mins prior to breakfast and dinner  Goal  no higher than 180     Lab Results   Component Value Date    HGBA1C 12.2 (H) 05/31/2022   Less than 7%     Keep in refrigerator     [email or web address]  Eat fit chase  Myfitnesspal chase  [email or web address]

## 2022-07-07 RX ORDER — ERGOCALCIFEROL 1.25 MG/1
50000 CAPSULE ORAL
Qty: 12 CAPSULE | Refills: 3 | Status: SHIPPED | OUTPATIENT
Start: 2022-07-07 | End: 2023-01-11

## 2022-08-17 ENCOUNTER — PATIENT OUTREACH (OUTPATIENT)
Dept: ADMINISTRATIVE | Facility: HOSPITAL | Age: 67
End: 2022-08-17
Payer: COMMERCIAL

## 2022-08-17 NOTE — PROGRESS NOTES
Health Maintenance Due   Topic Date Due    Colorectal Cancer Screening  07/08/2020    COVID-19 Vaccine (3 - Booster for Pfizer series) 08/31/2021    Shingles Vaccine (2 of 2) 07/26/2022     Triggered links updated HM, immunizations and care everywhere.chart review

## 2022-09-10 ENCOUNTER — LAB VISIT (OUTPATIENT)
Dept: LAB | Facility: HOSPITAL | Age: 67
End: 2022-09-10
Attending: INTERNAL MEDICINE
Payer: MEDICARE

## 2022-09-10 DIAGNOSIS — E11.9 TYPE 2 DIABETES MELLITUS WITHOUT COMPLICATION, WITHOUT LONG-TERM CURRENT USE OF INSULIN: ICD-10-CM

## 2022-09-10 LAB
ESTIMATED AVG GLUCOSE: 295 MG/DL (ref 68–131)
HBA1C MFR BLD: 11.9 % (ref 4–5.6)

## 2022-09-10 PROCEDURE — 36415 COLL VENOUS BLD VENIPUNCTURE: CPT | Mod: PO | Performed by: NURSE PRACTITIONER

## 2022-09-10 PROCEDURE — 83036 HEMOGLOBIN GLYCOSYLATED A1C: CPT | Performed by: NURSE PRACTITIONER

## 2022-11-01 DIAGNOSIS — Z12.11 COLON CANCER SCREENING: Primary | ICD-10-CM

## 2023-01-07 ENCOUNTER — LAB VISIT (OUTPATIENT)
Dept: LAB | Facility: HOSPITAL | Age: 68
End: 2023-01-07
Payer: MEDICARE

## 2023-01-07 DIAGNOSIS — E11.9 TYPE 2 DIABETES MELLITUS WITHOUT COMPLICATION, WITHOUT LONG-TERM CURRENT USE OF INSULIN: ICD-10-CM

## 2023-01-07 LAB
ESTIMATED AVG GLUCOSE: 286 MG/DL (ref 68–131)
HBA1C MFR BLD: 11.6 % (ref 4–5.6)

## 2023-01-07 PROCEDURE — 83036 HEMOGLOBIN GLYCOSYLATED A1C: CPT | Mod: HCNC | Performed by: NURSE PRACTITIONER

## 2023-01-07 PROCEDURE — 36415 COLL VENOUS BLD VENIPUNCTURE: CPT | Mod: HCNC,PO | Performed by: NURSE PRACTITIONER

## 2023-01-10 NOTE — PROGRESS NOTES
CHIEF COMPLAINT: Type 2 Diabetes     HPI: Mr. Sanchez Purcell is a 67 y.o. male who was diagnosed with Type 2 DM > 12 years ago.   Worked for [employer] company.   Has not started levemir.  Has needle phobias.   A1c remains uncontrolled.   Admits to eating higher carbs at times.   Has interest in dexcom g6.     Lab Results   Component Value Date    HGBA1C 11.6 (H) 01/07/2023     PREVIOUS DIABETES MEDICATIONS TRIED  Metformin  Glipizide   levemir- not started yet    CURRENT DIABETIC MEDS: metformin 1000 mg xr twice a day , glipizide 5 mg twice a day w/ meals , levemir- off   Addendum:  Novolog correction scale 180-230+2, etc.  On MDI injections 3 x a day   Testing 4 x a day  Patient is willing and able to use the device  Demonstrated an understanding of the technology and is motivated to use CGM  Patient expected to adhere to a comprehensive diabetes treatment plan and patient has adequate medical supervision  Patient experiences recurrent, multiple impaired awareness of hypoglycemia (hypoglycemia unawareness)    Diabetes Management Status    Statin: Taking  ACE/ARB: Taking    Screening or Prevention Patient's value Goal Complete/Controlled?   HgA1C Testing and Control   Lab Results   Component Value Date    HGBA1C 11.6 (H) 01/07/2023      Annually/Less than 8% No   Lipid profile : 05/31/2022 Annually Yes   LDL control Lab Results   Component Value Date    LDLCALC 68.8 05/31/2022    Annually/Less than 100 mg/dl  Yes   Nephropathy screening Lab Results   Component Value Date    LABMICR 14.0 05/31/2022     Lab Results   Component Value Date    PROTEINUA Negative 05/24/2010    Annually Yes   Blood pressure BP Readings from Last 1 Encounters:   01/11/23 136/88    Less than 140/90 Yes   Dilated retinal exam : 06/07/2022 Annually Yes   Foot exam   : 06/16/2022 Annually No     REVIEW OF SYSTEMS  General: no weakness, fatigue, weight fluctuations 5#   Eyes: no double or blurred vision, eye pain, or redness  Cardiovascular: no  "chest pain, palpitations, edema, or murmurs.   Respiratory: no cough or dyspnea.   GI: no heartburn, nausea, or changes in bowel patterns; good appetite.   Skin: no rashes, dryness, itching, or reactions at insulin injection sites.  Neuro: no numbness, tingling, tremors, or vertigo.   Endocrine: + polyuria, polydipsia, polyphagia, heat or cold intolerance.     Vital Signs  /88 (BP Location: Left arm, Patient Position: Sitting, BP Method: Large (Manual))   Pulse 66   Ht 5' 8" (1.727 m)   Wt 99.6 kg (219 lb 9.3 oz)   SpO2 97%   BMI 33.39 kg/m²     Hemoglobin A1C   Date Value Ref Range Status   01/07/2023 11.6 (H) 4.0 - 5.6 % Final     Comment:     ADA Screening Guidelines:  5.7-6.4%  Consistent with prediabetes  >or=6.5%  Consistent with diabetes    High levels of fetal hemoglobin interfere with the HbA1C  assay. Heterozygous hemoglobin variants (HbS, HgC, etc)do  not significantly interfere with this assay.   However, presence of multiple variants may affect accuracy.     09/10/2022 11.9 (H) 4.0 - 5.6 % Final     Comment:     ADA Screening Guidelines:  5.7-6.4%  Consistent with prediabetes  >or=6.5%  Consistent with diabetes    High levels of fetal hemoglobin interfere with the HbA1C  assay. Heterozygous hemoglobin variants (HbS, HgC, etc)do  not significantly interfere with this assay.   However, presence of multiple variants may affect accuracy.     05/31/2022 12.2 (H) 4.0 - 5.6 % Final     Comment:     ADA Screening Guidelines:  5.7-6.4%  Consistent with prediabetes  >or=6.5%  Consistent with diabetes    High levels of fetal hemoglobin interfere with the HbA1C  assay. Heterozygous hemoglobin variants (HbS, HgC, etc)do  not significantly interfere with this assay.   However, presence of multiple variants may affect accuracy.          Chemistry        Component Value Date/Time     05/31/2022 1032    K 4.6 05/31/2022 1032    CL 99 05/31/2022 1032    CO2 27 05/31/2022 1032    BUN 15 05/31/2022 1032    " CREATININE 1.1 05/31/2022 1032     (H) 05/31/2022 1032        Component Value Date/Time    CALCIUM 9.9 05/31/2022 1032    ALKPHOS 129 05/31/2022 1032    AST 30 05/31/2022 1032    ALT 43 05/31/2022 1032    BILITOT 0.7 05/31/2022 1032    ESTGFRAFRICA >60.0 05/31/2022 1032    EGFRNONAA >60.0 05/31/2022 1032           Lab Results   Component Value Date    TSH 0.633 05/31/2022      Lab Results   Component Value Date    CHOL 147 05/31/2022    CHOL 159 05/29/2021    CHOL 195 01/24/2020     Lab Results   Component Value Date    HDL 40 05/31/2022    HDL 30 (L) 05/29/2021    HDL 43 01/24/2020     Lab Results   Component Value Date    LDLCALC 68.8 05/31/2022    LDLCALC Invalid, Trig>400.0 05/29/2021    LDLCALC 118.2 01/24/2020     Lab Results   Component Value Date    TRIG 191 (H) 05/31/2022    TRIG 614 (H) 05/29/2021    TRIG 169 (H) 01/24/2020     Lab Results   Component Value Date    CHOLHDL 27.2 05/31/2022    CHOLHDL 18.9 (L) 05/29/2021    CHOLHDL 22.1 01/24/2020         PHYSICAL EXAMINATION  Constitutional: Appears well, no distress..  Reviewed vitals above.  Eyes: conjunctivae & lids intact; PERRLA, EOMs intact; optic discs   Neck: Supple, trachea midline.   Respiratory: No wheezes, even and unlabored, upon palpation wnl, percussion wnl  Cardiovascular: RRR;  no edema or varicosities  Lymph: deferred   Skin: warm and dry; no injection site reactions, no acanthosis nigracans observed.  Neuro:patient alert and cooperative, normal affect; steady gait.  Psychiatric: judgement & insight intact, orientation of time, place & person intact, memory; mood & affect wnl     Diabetes Foot Exam:   Deferred     Assessment/Plan  1. Type 2 diabetes mellitus without complication, without long-term current use of insulin  Hemoglobin A1C    Ambulatory referral/consult to Diabetes Education      2. Prostate cancer        3. Hyperlipidemia associated with type 2 diabetes mellitus        4. Hypertension associated with diabetes           1-4. Follow up in 4 months w/ me   A1c in 6 weeks  A1c in 4 months  Add jardiance 10 mg daily   Add glipizide 10 mg bid w/ meals   A1c goal less than 7%  Discussed rybelsus- defer for now   Send rx for novolog correction scale 180-230+2 ,etc  Send rx bd autoshield needles- to help with needle phobia   Dexcom g6 bundle - [facility]  F/u w/ urology   Lab Results   Component Value Date    LDLCALC 68.8 05/31/2022     At goal   Bp controlled

## 2023-01-11 ENCOUNTER — OFFICE VISIT (OUTPATIENT)
Dept: INTERNAL MEDICINE | Facility: CLINIC | Age: 68
End: 2023-01-11
Payer: MEDICARE

## 2023-01-11 ENCOUNTER — TELEPHONE (OUTPATIENT)
Dept: INTERNAL MEDICINE | Facility: CLINIC | Age: 68
End: 2023-01-11

## 2023-01-11 VITALS
OXYGEN SATURATION: 97 % | BODY MASS INDEX: 33.28 KG/M2 | WEIGHT: 219.56 LBS | HEART RATE: 66 BPM | SYSTOLIC BLOOD PRESSURE: 136 MMHG | HEIGHT: 68 IN | DIASTOLIC BLOOD PRESSURE: 88 MMHG

## 2023-01-11 DIAGNOSIS — E11.59 HYPERTENSION ASSOCIATED WITH DIABETES: ICD-10-CM

## 2023-01-11 DIAGNOSIS — E11.9 TYPE 2 DIABETES MELLITUS WITHOUT COMPLICATION, WITHOUT LONG-TERM CURRENT USE OF INSULIN: Primary | ICD-10-CM

## 2023-01-11 DIAGNOSIS — C61 PROSTATE CANCER: ICD-10-CM

## 2023-01-11 DIAGNOSIS — I15.2 HYPERTENSION ASSOCIATED WITH DIABETES: ICD-10-CM

## 2023-01-11 DIAGNOSIS — E78.5 HYPERLIPIDEMIA ASSOCIATED WITH TYPE 2 DIABETES MELLITUS: ICD-10-CM

## 2023-01-11 DIAGNOSIS — E11.69 HYPERLIPIDEMIA ASSOCIATED WITH TYPE 2 DIABETES MELLITUS: ICD-10-CM

## 2023-01-11 PROCEDURE — 1126F PR PAIN SEVERITY QUANTIFIED, NO PAIN PRESENT: ICD-10-PCS | Mod: HCNC,CPTII,S$GLB, | Performed by: NURSE PRACTITIONER

## 2023-01-11 PROCEDURE — 99499 RISK ADDL DX/OHS AUDIT: ICD-10-PCS | Mod: S$GLB,,, | Performed by: NURSE PRACTITIONER

## 2023-01-11 PROCEDURE — 3046F PR MOST RECENT HEMOGLOBIN A1C LEVEL > 9.0%: ICD-10-PCS | Mod: HCNC,CPTII,S$GLB, | Performed by: NURSE PRACTITIONER

## 2023-01-11 PROCEDURE — 3288F FALL RISK ASSESSMENT DOCD: CPT | Mod: HCNC,CPTII,S$GLB, | Performed by: NURSE PRACTITIONER

## 2023-01-11 PROCEDURE — 99999 PR PBB SHADOW E&M-EST. PATIENT-LVL IV: CPT | Mod: PBBFAC,HCNC,, | Performed by: NURSE PRACTITIONER

## 2023-01-11 PROCEDURE — 99214 PR OFFICE/OUTPT VISIT, EST, LEVL IV, 30-39 MIN: ICD-10-PCS | Mod: HCNC,S$GLB,, | Performed by: NURSE PRACTITIONER

## 2023-01-11 PROCEDURE — 3008F BODY MASS INDEX DOCD: CPT | Mod: HCNC,CPTII,S$GLB, | Performed by: NURSE PRACTITIONER

## 2023-01-11 PROCEDURE — 1126F AMNT PAIN NOTED NONE PRSNT: CPT | Mod: HCNC,CPTII,S$GLB, | Performed by: NURSE PRACTITIONER

## 2023-01-11 PROCEDURE — 99499 UNLISTED E&M SERVICE: CPT | Mod: S$GLB,,, | Performed by: NURSE PRACTITIONER

## 2023-01-11 PROCEDURE — 3008F PR BODY MASS INDEX (BMI) DOCUMENTED: ICD-10-PCS | Mod: HCNC,CPTII,S$GLB, | Performed by: NURSE PRACTITIONER

## 2023-01-11 PROCEDURE — 3046F HEMOGLOBIN A1C LEVEL >9.0%: CPT | Mod: HCNC,CPTII,S$GLB, | Performed by: NURSE PRACTITIONER

## 2023-01-11 PROCEDURE — 1160F RVW MEDS BY RX/DR IN RCRD: CPT | Mod: HCNC,CPTII,S$GLB, | Performed by: NURSE PRACTITIONER

## 2023-01-11 PROCEDURE — 1101F PT FALLS ASSESS-DOCD LE1/YR: CPT | Mod: HCNC,CPTII,S$GLB, | Performed by: NURSE PRACTITIONER

## 2023-01-11 PROCEDURE — 3075F SYST BP GE 130 - 139MM HG: CPT | Mod: HCNC,CPTII,S$GLB, | Performed by: NURSE PRACTITIONER

## 2023-01-11 PROCEDURE — 3079F DIAST BP 80-89 MM HG: CPT | Mod: HCNC,CPTII,S$GLB, | Performed by: NURSE PRACTITIONER

## 2023-01-11 PROCEDURE — 3075F PR MOST RECENT SYSTOLIC BLOOD PRESS GE 130-139MM HG: ICD-10-PCS | Mod: HCNC,CPTII,S$GLB, | Performed by: NURSE PRACTITIONER

## 2023-01-11 PROCEDURE — 3072F LOW RISK FOR RETINOPATHY: CPT | Mod: HCNC,CPTII,S$GLB, | Performed by: NURSE PRACTITIONER

## 2023-01-11 PROCEDURE — 1159F PR MEDICATION LIST DOCUMENTED IN MEDICAL RECORD: ICD-10-PCS | Mod: HCNC,CPTII,S$GLB, | Performed by: NURSE PRACTITIONER

## 2023-01-11 PROCEDURE — 3288F PR FALLS RISK ASSESSMENT DOCUMENTED: ICD-10-PCS | Mod: HCNC,CPTII,S$GLB, | Performed by: NURSE PRACTITIONER

## 2023-01-11 PROCEDURE — 99999 PR PBB SHADOW E&M-EST. PATIENT-LVL IV: ICD-10-PCS | Mod: PBBFAC,HCNC,, | Performed by: NURSE PRACTITIONER

## 2023-01-11 PROCEDURE — 1101F PR PT FALLS ASSESS DOC 0-1 FALLS W/OUT INJ PAST YR: ICD-10-PCS | Mod: HCNC,CPTII,S$GLB, | Performed by: NURSE PRACTITIONER

## 2023-01-11 PROCEDURE — 1159F MED LIST DOCD IN RCRD: CPT | Mod: HCNC,CPTII,S$GLB, | Performed by: NURSE PRACTITIONER

## 2023-01-11 PROCEDURE — 3079F PR MOST RECENT DIASTOLIC BLOOD PRESSURE 80-89 MM HG: ICD-10-PCS | Mod: HCNC,CPTII,S$GLB, | Performed by: NURSE PRACTITIONER

## 2023-01-11 PROCEDURE — 99214 OFFICE O/P EST MOD 30 MIN: CPT | Mod: HCNC,S$GLB,, | Performed by: NURSE PRACTITIONER

## 2023-01-11 PROCEDURE — 3072F PR LOW RISK FOR RETINOPATHY: ICD-10-PCS | Mod: HCNC,CPTII,S$GLB, | Performed by: NURSE PRACTITIONER

## 2023-01-11 PROCEDURE — 1160F PR REVIEW ALL MEDS BY PRESCRIBER/CLIN PHARMACIST DOCUMENTED: ICD-10-PCS | Mod: HCNC,CPTII,S$GLB, | Performed by: NURSE PRACTITIONER

## 2023-01-11 RX ORDER — GLIPIZIDE 10 MG/1
10 TABLET ORAL
Qty: 180 TABLET | Refills: 3 | Status: SHIPPED | OUTPATIENT
Start: 2023-01-11 | End: 2023-06-06 | Stop reason: SDUPTHER

## 2023-01-11 RX ORDER — PEN NEEDLE, DIABETIC, SAFETY 30 GX3/16"
NEEDLE, DISPOSABLE MISCELLANEOUS
Qty: 300 EACH | Refills: 3 | Status: SHIPPED | OUTPATIENT
Start: 2023-01-11 | End: 2023-06-06

## 2023-01-11 RX ORDER — INSULIN ASPART 100 [IU]/ML
INJECTION, SOLUTION INTRAVENOUS; SUBCUTANEOUS
Qty: 15 ML | Refills: 6 | Status: SHIPPED | OUTPATIENT
Start: 2023-01-11 | End: 2023-06-06

## 2023-01-11 NOTE — Clinical Note
Adding novolog 180-230+2, etc. Dexcom -[facility] , adding jardiance 10 mg daily

## 2023-01-11 NOTE — PATIENT INSTRUCTIONS
Novolog correction 180-230+2 , 231-280+4, 281-330+6, etc.   [email or web address]   [facility] supplier     Lab Results   Component Value Date    HGBA1C 11.6 (H) 01/07/2023     Goal less than 7.5%   Avg 165 or less     [email or web address]  Eat fit chase  [employer] chase  [email or web address]     Goal  no higher than 180/200     Increase glipizide 10 mg before 2 meals (15 mins prior)   Add new medication: jardiance 10 mg daily - drink 4-5 bottles of water/day     Follow up in 4 months w/Irielle  A1c cmp in 6 weeks   A1c  cmp lipid panel in 4 months

## 2023-01-11 NOTE — TELEPHONE ENCOUNTER
----- Message from Doris Holloway sent at 1/11/2023  9:54 AM CST -----  Regarding: Lab Orders  Good morning,    Patient I schedule with Dr Anthony on 5/4. Patient requesting orders for lab to be done prior to his appt . Please add orders to be scheduled.  Thanks,

## 2023-02-13 ENCOUNTER — TELEPHONE (OUTPATIENT)
Dept: DIABETES | Facility: CLINIC | Age: 68
End: 2023-02-13
Payer: MEDICARE

## 2023-02-25 ENCOUNTER — LAB VISIT (OUTPATIENT)
Dept: LAB | Facility: HOSPITAL | Age: 68
End: 2023-02-25
Attending: INTERNAL MEDICINE
Payer: MEDICARE

## 2023-02-25 DIAGNOSIS — E11.9 TYPE 2 DIABETES MELLITUS WITHOUT COMPLICATION, WITHOUT LONG-TERM CURRENT USE OF INSULIN: ICD-10-CM

## 2023-02-25 LAB
ALBUMIN SERPL BCP-MCNC: 4 G/DL (ref 3.5–5.2)
ALP SERPL-CCNC: 134 U/L (ref 55–135)
ALT SERPL W/O P-5'-P-CCNC: 26 U/L (ref 10–44)
ANION GAP SERPL CALC-SCNC: 10 MMOL/L (ref 8–16)
AST SERPL-CCNC: 21 U/L (ref 10–40)
BILIRUB SERPL-MCNC: 0.5 MG/DL (ref 0.1–1)
BUN SERPL-MCNC: 10 MG/DL (ref 8–23)
CALCIUM SERPL-MCNC: 10.1 MG/DL (ref 8.7–10.5)
CHLORIDE SERPL-SCNC: 100 MMOL/L (ref 95–110)
CO2 SERPL-SCNC: 27 MMOL/L (ref 23–29)
CREAT SERPL-MCNC: 1.2 MG/DL (ref 0.5–1.4)
EST. GFR  (NO RACE VARIABLE): >60 ML/MIN/1.73 M^2
ESTIMATED AVG GLUCOSE: 306 MG/DL (ref 68–131)
GLUCOSE SERPL-MCNC: 337 MG/DL (ref 70–110)
HBA1C MFR BLD: 12.3 % (ref 4–5.6)
POTASSIUM SERPL-SCNC: 4.8 MMOL/L (ref 3.5–5.1)
PROT SERPL-MCNC: 8 G/DL (ref 6–8.4)
SODIUM SERPL-SCNC: 137 MMOL/L (ref 136–145)

## 2023-02-25 PROCEDURE — 36415 COLL VENOUS BLD VENIPUNCTURE: CPT | Mod: HCNC,PO | Performed by: NURSE PRACTITIONER

## 2023-02-25 PROCEDURE — 80053 COMPREHEN METABOLIC PANEL: CPT | Mod: HCNC | Performed by: NURSE PRACTITIONER

## 2023-02-25 PROCEDURE — 83036 HEMOGLOBIN GLYCOSYLATED A1C: CPT | Mod: HCNC | Performed by: NURSE PRACTITIONER

## 2023-03-06 ENCOUNTER — TELEPHONE (OUTPATIENT)
Dept: INTERNAL MEDICINE | Facility: CLINIC | Age: 68
End: 2023-03-06
Payer: MEDICARE

## 2023-03-06 NOTE — TELEPHONE ENCOUNTER
----- Message from SHARLENE Khan, FNP sent at 3/5/2023  7:36 AM CST -----  Regarding: blood sugar check  Check in with pt to see if his #s are coming down  A1cs have been running high over the past year or so  Keep me posted,  Wally   Goal sugar  no higher than 220 throughout day

## 2023-04-10 ENCOUNTER — TELEPHONE (OUTPATIENT)
Dept: INTERNAL MEDICINE | Facility: CLINIC | Age: 68
End: 2023-04-10
Payer: MEDICARE

## 2023-04-10 NOTE — TELEPHONE ENCOUNTER
----- Message from Aleah Boss sent at 4/10/2023  2:55 PM CDT -----  Contact: Tracy @ [phone]  Good Afternoon  Insurance is calling to confirm if patient has diabetic and heat issues    Please call and advise with Ref# [id number]

## 2023-04-10 NOTE — TELEPHONE ENCOUNTER
Patient is a known diabetic. Does have high blood pressure and high cholesterol. Unsure if he has any other underlying heart disease.

## 2023-05-04 ENCOUNTER — LAB VISIT (OUTPATIENT)
Dept: LAB | Facility: HOSPITAL | Age: 68
End: 2023-05-04
Attending: INTERNAL MEDICINE
Payer: MEDICARE

## 2023-05-04 ENCOUNTER — OFFICE VISIT (OUTPATIENT)
Dept: INTERNAL MEDICINE | Facility: CLINIC | Age: 68
End: 2023-05-04
Payer: MEDICARE

## 2023-05-04 VITALS
HEART RATE: 80 BPM | WEIGHT: 218.25 LBS | OXYGEN SATURATION: 99 % | SYSTOLIC BLOOD PRESSURE: 122 MMHG | HEIGHT: 68 IN | BODY MASS INDEX: 33.08 KG/M2 | DIASTOLIC BLOOD PRESSURE: 78 MMHG

## 2023-05-04 DIAGNOSIS — E11.59 HYPERTENSION ASSOCIATED WITH DIABETES: ICD-10-CM

## 2023-05-04 DIAGNOSIS — Z12.11 SCREENING FOR COLON CANCER: ICD-10-CM

## 2023-05-04 DIAGNOSIS — E11.65 UNCONTROLLED TYPE 2 DIABETES MELLITUS WITH HYPERGLYCEMIA: ICD-10-CM

## 2023-05-04 DIAGNOSIS — Z23 NEED FOR VACCINATION FOR STREP PNEUMONIAE: ICD-10-CM

## 2023-05-04 DIAGNOSIS — E11.69 HYPERLIPIDEMIA ASSOCIATED WITH TYPE 2 DIABETES MELLITUS: ICD-10-CM

## 2023-05-04 DIAGNOSIS — C61 PROSTATE CANCER: Primary | ICD-10-CM

## 2023-05-04 DIAGNOSIS — E78.5 HYPERLIPIDEMIA ASSOCIATED WITH TYPE 2 DIABETES MELLITUS: ICD-10-CM

## 2023-05-04 DIAGNOSIS — Z00.00 ENCOUNTER FOR ANNUAL PHYSICAL EXAM: ICD-10-CM

## 2023-05-04 DIAGNOSIS — I15.2 HYPERTENSION ASSOCIATED WITH DIABETES: ICD-10-CM

## 2023-05-04 DIAGNOSIS — Z23 NEED FOR SHINGLES VACCINE: ICD-10-CM

## 2023-05-04 LAB
ALBUMIN/CREAT UR: 6.5 UG/MG (ref 0–30)
CREAT UR-MCNC: 138 MG/DL (ref 23–375)
MICROALBUMIN UR DL<=1MG/L-MCNC: 9 UG/ML

## 2023-05-04 PROCEDURE — 3008F BODY MASS INDEX DOCD: CPT | Mod: CPTII,S$GLB,, | Performed by: INTERNAL MEDICINE

## 2023-05-04 PROCEDURE — 99397 PR PREVENTIVE VISIT,EST,65 & OVER: ICD-10-PCS | Mod: 25,S$GLB,, | Performed by: INTERNAL MEDICINE

## 2023-05-04 PROCEDURE — 1125F AMNT PAIN NOTED PAIN PRSNT: CPT | Mod: CPTII,S$GLB,, | Performed by: INTERNAL MEDICINE

## 2023-05-04 PROCEDURE — 3074F SYST BP LT 130 MM HG: CPT | Mod: CPTII,S$GLB,, | Performed by: INTERNAL MEDICINE

## 2023-05-04 PROCEDURE — 3288F FALL RISK ASSESSMENT DOCD: CPT | Mod: CPTII,S$GLB,, | Performed by: INTERNAL MEDICINE

## 2023-05-04 PROCEDURE — 1101F PT FALLS ASSESS-DOCD LE1/YR: CPT | Mod: CPTII,S$GLB,, | Performed by: INTERNAL MEDICINE

## 2023-05-04 PROCEDURE — 3072F LOW RISK FOR RETINOPATHY: CPT | Mod: CPTII,S$GLB,, | Performed by: INTERNAL MEDICINE

## 2023-05-04 PROCEDURE — G0009 PNEUMOCOCCAL POLYSACCHARIDE VACCINE 23-VALENT =>2YO SQ IM: ICD-10-PCS | Mod: S$GLB,,, | Performed by: INTERNAL MEDICINE

## 2023-05-04 PROCEDURE — 3046F PR MOST RECENT HEMOGLOBIN A1C LEVEL > 9.0%: ICD-10-PCS | Mod: CPTII,S$GLB,, | Performed by: INTERNAL MEDICINE

## 2023-05-04 PROCEDURE — 90732 PPSV23 VACC 2 YRS+ SUBQ/IM: CPT | Mod: S$GLB,,, | Performed by: INTERNAL MEDICINE

## 2023-05-04 PROCEDURE — 90732 PNEUMOCOCCAL POLYSACCHARIDE VACCINE 23-VALENT =>2YO SQ IM: ICD-10-PCS | Mod: S$GLB,,, | Performed by: INTERNAL MEDICINE

## 2023-05-04 PROCEDURE — 99999 PR PBB SHADOW E&M-EST. PATIENT-LVL IV: ICD-10-PCS | Mod: PBBFAC,,, | Performed by: INTERNAL MEDICINE

## 2023-05-04 PROCEDURE — 1160F RVW MEDS BY RX/DR IN RCRD: CPT | Mod: CPTII,S$GLB,, | Performed by: INTERNAL MEDICINE

## 2023-05-04 PROCEDURE — 3078F DIAST BP <80 MM HG: CPT | Mod: CPTII,S$GLB,, | Performed by: INTERNAL MEDICINE

## 2023-05-04 PROCEDURE — 3046F HEMOGLOBIN A1C LEVEL >9.0%: CPT | Mod: CPTII,S$GLB,, | Performed by: INTERNAL MEDICINE

## 2023-05-04 PROCEDURE — 1159F PR MEDICATION LIST DOCUMENTED IN MEDICAL RECORD: ICD-10-PCS | Mod: CPTII,S$GLB,, | Performed by: INTERNAL MEDICINE

## 2023-05-04 PROCEDURE — 1159F MED LIST DOCD IN RCRD: CPT | Mod: CPTII,S$GLB,, | Performed by: INTERNAL MEDICINE

## 2023-05-04 PROCEDURE — 1101F PR PT FALLS ASSESS DOC 0-1 FALLS W/OUT INJ PAST YR: ICD-10-PCS | Mod: CPTII,S$GLB,, | Performed by: INTERNAL MEDICINE

## 2023-05-04 PROCEDURE — 1125F PR PAIN SEVERITY QUANTIFIED, PAIN PRESENT: ICD-10-PCS | Mod: CPTII,S$GLB,, | Performed by: INTERNAL MEDICINE

## 2023-05-04 PROCEDURE — 99999 PR PBB SHADOW E&M-EST. PATIENT-LVL IV: CPT | Mod: PBBFAC,,, | Performed by: INTERNAL MEDICINE

## 2023-05-04 PROCEDURE — 3072F PR LOW RISK FOR RETINOPATHY: ICD-10-PCS | Mod: CPTII,S$GLB,, | Performed by: INTERNAL MEDICINE

## 2023-05-04 PROCEDURE — 3008F PR BODY MASS INDEX (BMI) DOCUMENTED: ICD-10-PCS | Mod: CPTII,S$GLB,, | Performed by: INTERNAL MEDICINE

## 2023-05-04 PROCEDURE — 3074F PR MOST RECENT SYSTOLIC BLOOD PRESSURE < 130 MM HG: ICD-10-PCS | Mod: CPTII,S$GLB,, | Performed by: INTERNAL MEDICINE

## 2023-05-04 PROCEDURE — 3288F PR FALLS RISK ASSESSMENT DOCUMENTED: ICD-10-PCS | Mod: CPTII,S$GLB,, | Performed by: INTERNAL MEDICINE

## 2023-05-04 PROCEDURE — 82570 ASSAY OF URINE CREATININE: CPT | Performed by: INTERNAL MEDICINE

## 2023-05-04 PROCEDURE — 3078F PR MOST RECENT DIASTOLIC BLOOD PRESSURE < 80 MM HG: ICD-10-PCS | Mod: CPTII,S$GLB,, | Performed by: INTERNAL MEDICINE

## 2023-05-04 PROCEDURE — 1160F PR REVIEW ALL MEDS BY PRESCRIBER/CLIN PHARMACIST DOCUMENTED: ICD-10-PCS | Mod: CPTII,S$GLB,, | Performed by: INTERNAL MEDICINE

## 2023-05-04 PROCEDURE — G0009 ADMIN PNEUMOCOCCAL VACCINE: HCPCS | Mod: S$GLB,,, | Performed by: INTERNAL MEDICINE

## 2023-05-04 PROCEDURE — 99397 PER PM REEVAL EST PAT 65+ YR: CPT | Mod: 25,S$GLB,, | Performed by: INTERNAL MEDICINE

## 2023-05-04 RX ORDER — ZOSTER VACCINE RECOMBINANT, ADJUVANTED 50 MCG/0.5
0.5 KIT INTRAMUSCULAR ONCE
Qty: 1 EACH | Refills: 0 | Status: SHIPPED | OUTPATIENT
Start: 2023-05-04 | End: 2023-05-04

## 2023-05-04 NOTE — PROGRESS NOTES
After obtaining consent, and per orders of Dr. Jamin Anthony, injection of Pneumovax 23 given by Negin Moise on the right deltoid. Patient instructed to remain in clinic for 15 minutes afterwards, and to report any adverse reaction to me immediately.

## 2023-05-04 NOTE — PROGRESS NOTES
Subjective:       Patient ID: Sanchez Purcell is a 67 y.o. male.    Chief Complaint: Follow-up and Shoulder Pain      HPI  Sanchez Purcell is a 67 y.o. year old male with t2dm (uncontrolled), HTN, HLD, history of prostate cancer (s/p prostatectomy) presents for annual exam. Currently he is taking metformin, glipizide, not taking jardiance (no particular reason, afraid of new medicines). Taking all other medications as prescribed. Heart to heart discussion regarding his uncontrolled t2dm. Patient willing to try injectables if needed. Has appt with dm NP next week.     Review of Systems   Constitutional:  Negative for activity change, appetite change, chills, fatigue, fever and unexpected weight change.   HENT:  Negative for congestion, rhinorrhea and sore throat.    Eyes:  Negative for visual disturbance.   Respiratory:  Negative for shortness of breath.    Cardiovascular:  Negative for chest pain.   Gastrointestinal:  Negative for abdominal pain, diarrhea, nausea and vomiting.   Genitourinary:  Negative for difficulty urinating and dysuria.   Musculoskeletal:  Negative for arthralgias, back pain and myalgias.   Skin:  Negative for color change and rash.   Neurological:  Negative for dizziness, weakness and headaches.       Past Medical History:   Diagnosis Date    Diabetes mellitus type II     Heart murmur 1990's    Hyperlipidemia     Hypertension     Prostate cancer         Prior to Admission medications    Medication Sig Start Date End Date Taking? Authorizing Provider   atorvastatin (LIPITOR) 20 MG tablet Take 1 tablet (20 mg total) by mouth once daily. 5/31/22  Yes Jamin Anthony MD   blood-glucose sensor Debbie Use dexcom g7, change every 10 days , e11.65 3/8/23  Yes SHARLENE Khan, CHRISTINAP   clotrimazole-betamethasone 1-0.05% (LOTRISONE) cream Apply topically 2 (two) times daily. 5/31/22  Yes Jamin Anthony MD   empagliflozin (JARDIANCE) 10 mg tablet Take 1 tablet (10 mg total) by mouth once daily. 1/11/23  Yes  "SHARLENE Khan FNP   fenofibrate (TRICOR) 54 MG tablet TAKE 1 TABLET(54 MG) BY MOUTH EVERY DAY 6/21/22  Yes Jamin Anthony MD   glipiZIDE (GLUCOTROL) 10 MG tablet Take 1 tablet (10 mg total) by mouth 2 (two) times daily before meals. 1/11/23 1/11/24 Yes SHARLENE Khan FNP   losartan (COZAAR) 50 MG tablet Take 1 tablet (50 mg total) by mouth once daily. 5/31/22  Yes Jamin Anthony MD   metFORMIN (GLUCOPHAGE-XR) 500 MG ER 24hr tablet TAKE 2 TABLETS(1000 MG) BY MOUTH TWICE DAILY. 6/16/22  Yes SHARLENE Khan FNP   pen needle,diabetic dual safty (BD AUTOSHIELD DUO PEN NEEDLE) 30 gauge x 3/16" Ndle Use 3 x a day, e 11.65 1/11/23  Yes SHARLENE Khan FNP   insulin aspart U-100 (NOVOLOG FLEXPEN U-100 INSULIN) 100 unit/mL (3 mL) InPn pen Inject if sugar is > 180, 180-230+2, 231-280+4, 281-330+6, 331-380+8, >380+10 MAX DAILY 30 UNITS.  Patient not taking: Reported on 5/4/2023 1/11/23   SHARLENE Khan FNP        Past medical history, surgical history, and family medical history reviewed and updated as appropriate.    Medications and allergies reviewed.     Objective:          Vitals:    05/04/23 0848   BP: 122/78   BP Location: Right arm   Patient Position: Sitting   Pulse: 80   SpO2: 99%   Weight: 99 kg (218 lb 4.1 oz)   Height: 5' 8" (1.727 m)     Body mass index is 33.19 kg/m².  Physical Exam  Constitutional:       General: He is not in acute distress.     Appearance: He is well-developed.   HENT:      Head: Normocephalic and atraumatic.      Nose: Nose normal.   Eyes:      General: No scleral icterus.     Extraocular Movements: Extraocular movements intact.   Neck:      Thyroid: No thyromegaly.      Vascular: No JVD.      Trachea: No tracheal deviation.   Cardiovascular:      Rate and Rhythm: Normal rate and regular rhythm.      Heart sounds: Normal heart sounds. No murmur heard.    No friction rub. No gallop.   Pulmonary:      Effort: Pulmonary effort is normal. No respiratory " distress.      Breath sounds: Normal breath sounds. No wheezing or rales.   Abdominal:      General: Bowel sounds are normal. There is no distension.      Palpations: Abdomen is soft. There is no mass.      Tenderness: There is no abdominal tenderness.   Musculoskeletal:         General: No tenderness. Normal range of motion.      Cervical back: Normal range of motion and neck supple.   Lymphadenopathy:      Cervical: No cervical adenopathy.   Skin:     General: Skin is warm and dry.      Findings: No rash.   Neurological:      Mental Status: He is alert and oriented to person, place, and time.      Cranial Nerves: No cranial nerve deficit.      Deep Tendon Reflexes: Reflexes normal.   Psychiatric:         Behavior: Behavior normal.       Lab Results   Component Value Date    WBC 3.55 (L) 05/31/2022    HGB 13.6 (L) 05/31/2022    HCT 42.9 05/31/2022     05/31/2022    CHOL 147 05/31/2022    TRIG 191 (H) 05/31/2022    HDL 40 05/31/2022    ALT 26 02/25/2023    AST 21 02/25/2023     02/25/2023    K 4.8 02/25/2023     02/25/2023    CREATININE 1.2 02/25/2023    BUN 10 02/25/2023    CO2 27 02/25/2023    TSH 0.633 05/31/2022    PSA 7.2 (H) 07/29/2016    HGBA1C 12.3 (H) 02/25/2023       Assessment:       1. Prostate cancer    2. Hypertension associated with diabetes    3. Hyperlipidemia associated with type 2 diabetes mellitus    4. Uncontrolled type 2 diabetes mellitus with hyperglycemia    5. Screening for colon cancer    6. Need for vaccination for Strep pneumoniae    7. Need for shingles vaccine          Plan:     Sanchez was seen today for follow-up and shoulder pain.    Diagnoses and all orders for this visit:    Prostate cancer  Comments:  follows with urology (Dr. Chavez). recheck PSA  Orders:  -     PROSTATE SPECIFIC ANTIGEN, DIAGNOSTIC; Future    Hypertension associated with diabetes  Comments:  controlled, no changes to current management.  Orders:  -     CBC W/ AUTO DIFFERENTIAL; Future  -      TSH; Future    Hyperlipidemia associated with type 2 diabetes mellitus  Comments:  controlled, no changes to current management.    Uncontrolled type 2 diabetes mellitus with hyperglycemia  Comments:  restart jardiance, continue metformin, continue glipizide. May be amenable to starting ozempic or trulicity, to discuss at f/u with kelley carrion  Orders:  -     Microalbumin/Creatinine Ratio, Urine; Future  -     (In Office Administered) Pneumococcal Polysaccharide Vaccine (23 Valent) (SQ/IM)    Screening for colon cancer  -     Ambulatory referral/consult to Endo Procedure ; Future    Need for vaccination for Strep pneumoniae  -     (In Office Administered) Pneumococcal Polysaccharide Vaccine (23 Valent) (SQ/IM)    Need for shingles vaccine  -     varicella-zoster [id number]-[id number], PF, (SHINGRIX, PF,) 50 mcg/0.5 mL injection; Inject 0.5 mLs into the muscle once. for 1 dose    Will address digital medicine next visit.     Health maintenance reviewed with patient.     Follow up in about 3 months (around 8/4/2023).    Jamin Anthony MD  Internal Medicine / Primary Care  [facility]  5/4/2023

## 2023-05-04 NOTE — PATIENT INSTRUCTIONS
Urine today  Pneumonia shot today  Schedule optometry appointment  Labwork is scheduled for Saturday 5/6/2023 in [place]    Vaccinations recommended:  Shingles vaccination - prescription printed  Covid-19 bivalent booster - no prescription needed, go to any pharmacy    Colonoscopy - a colonoscopy has been ordered for you. In order to schedule this appointment, please call [phone].     Return to clinic in 3 months or sooner if needed.

## 2023-05-06 ENCOUNTER — LAB VISIT (OUTPATIENT)
Dept: LAB | Facility: HOSPITAL | Age: 68
End: 2023-05-06
Attending: NURSE PRACTITIONER
Payer: MEDICARE

## 2023-05-06 DIAGNOSIS — I15.2 HYPERTENSION ASSOCIATED WITH DIABETES: ICD-10-CM

## 2023-05-06 DIAGNOSIS — E11.9 TYPE 2 DIABETES MELLITUS WITHOUT COMPLICATION, WITHOUT LONG-TERM CURRENT USE OF INSULIN: ICD-10-CM

## 2023-05-06 DIAGNOSIS — E78.5 HYPERLIPIDEMIA ASSOCIATED WITH TYPE 2 DIABETES MELLITUS: ICD-10-CM

## 2023-05-06 DIAGNOSIS — E11.69 HYPERLIPIDEMIA ASSOCIATED WITH TYPE 2 DIABETES MELLITUS: ICD-10-CM

## 2023-05-06 DIAGNOSIS — E11.59 HYPERTENSION ASSOCIATED WITH DIABETES: ICD-10-CM

## 2023-05-06 DIAGNOSIS — C61 PROSTATE CANCER: ICD-10-CM

## 2023-05-06 PROCEDURE — 85025 COMPLETE CBC W/AUTO DIFF WBC: CPT | Performed by: INTERNAL MEDICINE

## 2023-05-06 PROCEDURE — 84153 ASSAY OF PSA TOTAL: CPT | Performed by: INTERNAL MEDICINE

## 2023-05-06 PROCEDURE — 36415 COLL VENOUS BLD VENIPUNCTURE: CPT | Mod: PO | Performed by: INTERNAL MEDICINE

## 2023-05-06 PROCEDURE — 80061 LIPID PANEL: CPT | Performed by: NURSE PRACTITIONER

## 2023-05-06 PROCEDURE — 84443 ASSAY THYROID STIM HORMONE: CPT | Performed by: INTERNAL MEDICINE

## 2023-05-06 PROCEDURE — 83036 HEMOGLOBIN GLYCOSYLATED A1C: CPT | Performed by: NURSE PRACTITIONER

## 2023-05-06 PROCEDURE — 80053 COMPREHEN METABOLIC PANEL: CPT | Performed by: NURSE PRACTITIONER

## 2023-05-07 LAB
ALBUMIN SERPL BCP-MCNC: 3.9 G/DL (ref 3.5–5.2)
ALP SERPL-CCNC: 116 U/L (ref 55–135)
ALT SERPL W/O P-5'-P-CCNC: 21 U/L (ref 10–44)
ANION GAP SERPL CALC-SCNC: 10 MMOL/L (ref 8–16)
AST SERPL-CCNC: 17 U/L (ref 10–40)
BASOPHILS # BLD AUTO: 0.03 K/UL (ref 0–0.2)
BASOPHILS NFR BLD: 0.8 % (ref 0–1.9)
BILIRUB SERPL-MCNC: 0.7 MG/DL (ref 0.1–1)
BUN SERPL-MCNC: 14 MG/DL (ref 8–23)
CALCIUM SERPL-MCNC: 9.4 MG/DL (ref 8.7–10.5)
CHLORIDE SERPL-SCNC: 100 MMOL/L (ref 95–110)
CHOLEST SERPL-MCNC: 122 MG/DL (ref 120–199)
CHOLEST/HDLC SERPL: 3 {RATIO} (ref 2–5)
CO2 SERPL-SCNC: 27 MMOL/L (ref 23–29)
COMPLEXED PSA SERPL-MCNC: 0.02 NG/ML (ref 0–4)
CREAT SERPL-MCNC: 1 MG/DL (ref 0.5–1.4)
DIFFERENTIAL METHOD: ABNORMAL
EOSINOPHIL # BLD AUTO: 0.1 K/UL (ref 0–0.5)
EOSINOPHIL NFR BLD: 2.9 % (ref 0–8)
ERYTHROCYTE [DISTWIDTH] IN BLOOD BY AUTOMATED COUNT: 13.6 % (ref 11.5–14.5)
EST. GFR  (NO RACE VARIABLE): >60 ML/MIN/1.73 M^2
ESTIMATED AVG GLUCOSE: 301 MG/DL (ref 68–131)
GLUCOSE SERPL-MCNC: 285 MG/DL (ref 70–110)
HBA1C MFR BLD: 12.1 % (ref 4–5.6)
HCT VFR BLD AUTO: 38.8 % (ref 40–54)
HDLC SERPL-MCNC: 41 MG/DL (ref 40–75)
HDLC SERPL: 33.6 % (ref 20–50)
HGB BLD-MCNC: 12.2 G/DL (ref 14–18)
IMM GRANULOCYTES # BLD AUTO: 0.01 K/UL (ref 0–0.04)
IMM GRANULOCYTES NFR BLD AUTO: 0.3 % (ref 0–0.5)
LDLC SERPL CALC-MCNC: 57.4 MG/DL (ref 63–159)
LYMPHOCYTES # BLD AUTO: 1.6 K/UL (ref 1–4.8)
LYMPHOCYTES NFR BLD: 41.6 % (ref 18–48)
MCH RBC QN AUTO: 26.9 PG (ref 27–31)
MCHC RBC AUTO-ENTMCNC: 31.4 G/DL (ref 32–36)
MCV RBC AUTO: 86 FL (ref 82–98)
MONOCYTES # BLD AUTO: 0.4 K/UL (ref 0.3–1)
MONOCYTES NFR BLD: 10.1 % (ref 4–15)
NEUTROPHILS # BLD AUTO: 1.7 K/UL (ref 1.8–7.7)
NEUTROPHILS NFR BLD: 44.3 % (ref 38–73)
NONHDLC SERPL-MCNC: 81 MG/DL
NRBC BLD-RTO: 0 /100 WBC
PLATELET # BLD AUTO: 170 K/UL (ref 150–450)
PMV BLD AUTO: 11.8 FL (ref 9.2–12.9)
POTASSIUM SERPL-SCNC: 3.9 MMOL/L (ref 3.5–5.1)
PROT SERPL-MCNC: 7.4 G/DL (ref 6–8.4)
RBC # BLD AUTO: 4.53 M/UL (ref 4.6–6.2)
SODIUM SERPL-SCNC: 137 MMOL/L (ref 136–145)
TRIGL SERPL-MCNC: 118 MG/DL (ref 30–150)
TSH SERPL DL<=0.005 MIU/L-ACNC: 1.22 UIU/ML (ref 0.4–4)
WBC # BLD AUTO: 3.77 K/UL (ref 3.9–12.7)

## 2023-05-09 ENCOUNTER — TELEPHONE (OUTPATIENT)
Dept: INTERNAL MEDICINE | Facility: CLINIC | Age: 68
End: 2023-05-09
Payer: MEDICARE

## 2023-05-09 NOTE — TELEPHONE ENCOUNTER
----- Message from Negin Moise MA sent at 5/9/2023  8:10 AM CDT -----    ----- Message -----  From: Jamin Anthony MD  Sent: 5/9/2023   7:56 AM CDT  To: Raj [facility] Staff    Labwork stable.  A1c elevated 12.1  Follow up with Wally Dorantes for medication titration on 5/11/2023

## 2023-05-09 NOTE — TELEPHONE ENCOUNTER
Spoke with patient to relay message from Dr Anthony  Patient reports he will keep appointment with Ms Dorantes on 05/11/23

## 2023-05-11 ENCOUNTER — TELEPHONE (OUTPATIENT)
Dept: INTERNAL MEDICINE | Facility: CLINIC | Age: 68
End: 2023-05-11

## 2023-05-11 NOTE — TELEPHONE ENCOUNTER
----- Message from SHARLENE Khan, SOFÍA sent at 5/9/2023  1:07 PM CDT -----  Regarding: a1c  A1c too high  12.1 a1c  Discuss with pt  Thanks  IB

## 2023-05-16 ENCOUNTER — PES CALL (OUTPATIENT)
Dept: ADMINISTRATIVE | Facility: CLINIC | Age: 68
End: 2023-05-16
Payer: MEDICARE

## 2023-05-17 ENCOUNTER — TELEPHONE (OUTPATIENT)
Dept: INTERNAL MEDICINE | Facility: CLINIC | Age: 68
End: 2023-05-17
Payer: MEDICARE

## 2023-06-05 NOTE — PROGRESS NOTES
CHIEF COMPLAINT: Type 2 Diabetes     HPI: Mr. Sanchez Purcell is a 67 y.o. male who was diagnosed with Type 2 DM > 12 years ago.     Worked for [employer].   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  Has needle phobia.   A1c remains uncontrolled.   Received dexcom g6 from [facility].   Admits to eating higher carbs at times.     Lab Results   Component Value Date    HGBA1C 12.1 (H) 05/06/2023     PREVIOUS DIABETES MEDICATIONS TRIED  Metformin  Glipizide   Levemir  jardiance    CURRENT DIABETIC MEDS: metformin 1000 mg xr twice a day , glipizide 10 mg twice a day w/ meals     Addendum: injection 1 x a day  Patient is willing and able to use the device  Demonstrated an understanding of the technology and is motivated to use CGM  Patient expected to adhere to a comprehensive diabetes treatment plan and patient has adequate medical supervision  Patient experiences recurrent, multiple impaired awareness of hypoglycemia (hypoglycemia unawareness)    Diabetes Management Status    Statin: Taking  ACE/ARB: Taking    Screening or Prevention Patient's value Goal Complete/Controlled?   HgA1C Testing and Control   Lab Results   Component Value Date    HGBA1C 12.1 (H) 05/06/2023      Annually/Less than 8% No   Lipid profile : 05/06/2023 Annually Yes   LDL control Lab Results   Component Value Date    LDLCALC 57.4 (L) 05/06/2023    Annually/Less than 100 mg/dl  Yes   Nephropathy screening Lab Results   Component Value Date    LABMICR 9.0 05/04/2023     Lab Results   Component Value Date    PROTEINUA Negative 05/24/2010    Annually Yes   Blood pressure BP Readings from Last 1 Encounters:   05/04/23 122/78    Less than 140/90 Yes   Dilated retinal exam : 06/07/2022 Annually Yes   Foot exam   : 06/16/2022 Annually No     REVIEW OF SYSTEMS  General: no weakness, fatigue, weight fluctuations 5#   Eyes: no double or blurred vision, eye pain, or redness  Cardiovascular: no chest pain, palpitations, edema, or murmurs.   Respiratory: no cough or  dyspnea.   GI: no heartburn, nausea, or changes in bowel patterns; good appetite.   Skin: no rashes, dryness, itching, or reactions at insulin injection sites.  Neuro: no numbness, tingling, tremors, or vertigo.   Endocrine: + polyuria, polydipsia, polyphagia, heat or cold intolerance.     Vital Signs  There were no vitals taken for this visit.    Hemoglobin A1C   Date Value Ref Range Status   05/06/2023 12.1 (H) 4.0 - 5.6 % Final     Comment:     ADA Screening Guidelines:  5.7-6.4%  Consistent with prediabetes  >or=6.5%  Consistent with diabetes    High levels of fetal hemoglobin interfere with the HbA1C  assay. Heterozygous hemoglobin variants (HbS, HgC, etc)do  not significantly interfere with this assay.   However, presence of multiple variants may affect accuracy.     02/25/2023 12.3 (H) 4.0 - 5.6 % Final     Comment:     ADA Screening Guidelines:  5.7-6.4%  Consistent with prediabetes  >or=6.5%  Consistent with diabetes    High levels of fetal hemoglobin interfere with the HbA1C  assay. Heterozygous hemoglobin variants (HbS, HgC, etc)do  not significantly interfere with this assay.   However, presence of multiple variants may affect accuracy.     01/07/2023 11.6 (H) 4.0 - 5.6 % Final     Comment:     ADA Screening Guidelines:  5.7-6.4%  Consistent with prediabetes  >or=6.5%  Consistent with diabetes    High levels of fetal hemoglobin interfere with the HbA1C  assay. Heterozygous hemoglobin variants (HbS, HgC, etc)do  not significantly interfere with this assay.   However, presence of multiple variants may affect accuracy.          Chemistry        Component Value Date/Time     05/06/2023 0801    K 3.9 05/06/2023 0801     05/06/2023 0801    CO2 27 05/06/2023 0801    BUN 14 05/06/2023 0801    CREATININE 1.0 05/06/2023 0801     (H) 05/06/2023 0801        Component Value Date/Time    CALCIUM 9.4 05/06/2023 0801    ALKPHOS 116 05/06/2023 0801    AST 17 05/06/2023 0801    ALT 21 05/06/2023 0801     BILITOT 0.7 05/06/2023 0801    ESTGFRAFRICA >60.0 05/31/2022 1032    EGFRNONAA >60.0 05/31/2022 1032           Lab Results   Component Value Date    TSH 1.224 05/06/2023      Lab Results   Component Value Date    CHOL 122 05/06/2023    CHOL 147 05/31/2022    CHOL 159 05/29/2021     Lab Results   Component Value Date    HDL 41 05/06/2023    HDL 40 05/31/2022    HDL 30 (L) 05/29/2021     Lab Results   Component Value Date    LDLCALC 57.4 (L) 05/06/2023    LDLCALC 68.8 05/31/2022    LDLCALC Invalid, Trig>400.0 05/29/2021     Lab Results   Component Value Date    TRIG 118 05/06/2023    TRIG 191 (H) 05/31/2022    TRIG 614 (H) 05/29/2021     Lab Results   Component Value Date    CHOLHDL 33.6 05/06/2023    CHOLHDL 27.2 05/31/2022    CHOLHDL 18.9 (L) 05/29/2021         PHYSICAL EXAMINATION  Constitutional: Appears well, no distress..  Reviewed vitals above.  Eyes: conjunctivae & lids intact; PERRLA, EOMs intact; optic discs   Neck: Supple, trachea midline.   Respiratory: No wheezes, even and unlabored  Cardiovascular: RRR;  no edema or varicosities  Lymph: deferred   Skin: warm and dry; no injection site reactions, no acanthosis nigracans observed.  Neuro:patient alert and cooperative, normal affect; steady gait.  Psychiatric: judgement & insight intact, orientation of time, place & person intact, memory; mood & affect wnl     Diabetes Foot Exam:   Deferred     Assessment/Plan  1. Type 2 diabetes mellitus without complication, without long-term current use of insulin  Hemoglobin A1C    Ambulatory referral/consult to Optometry    Ambulatory referral/consult to Diabetes Education    Hemoglobin A1C      2. Hypertension associated with diabetes        3. Hyperlipidemia associated with type 2 diabetes mellitus        4. Prostate cancer          1-4. Follow up in 4 months w/ me   A1c prior   A1c goal less than 7.5%   [facility]- supplier -dexcom g6, will need   Pw issue with [employer]   [place] for dexcom g6   Optometry 2023    Bp controlled  Lab Results   Component Value Date    LDLCALC 57.4 (L) 05/06/2023     Near goal   F/u with urology     1-4. Follow up in 4 months w/ me   A1c in 6 weeks  A1c in 4 months  Add jardiance 10 mg daily   Add glipizide 10 mg bid w/ meals   A1c goal less than 7%  Discussed rybelsus- defer for now   Send rx for novolog correction scale 180-230+2 ,etc  Send rx bd autoshield needles- to help with needle phobia   Dexcom g6 bundle - [facility]  F/u w/ urology   Lab Results   Component Value Date    LDLCALC 57.4 (L) 05/06/2023     At goal   Bp controlled

## 2023-06-06 ENCOUNTER — OFFICE VISIT (OUTPATIENT)
Dept: INTERNAL MEDICINE | Facility: CLINIC | Age: 68
End: 2023-06-06
Payer: MEDICARE

## 2023-06-06 VITALS
DIASTOLIC BLOOD PRESSURE: 80 MMHG | OXYGEN SATURATION: 97 % | SYSTOLIC BLOOD PRESSURE: 132 MMHG | HEIGHT: 68 IN | BODY MASS INDEX: 32.91 KG/M2 | HEART RATE: 72 BPM | WEIGHT: 217.13 LBS

## 2023-06-06 DIAGNOSIS — C61 PROSTATE CANCER: ICD-10-CM

## 2023-06-06 DIAGNOSIS — I15.2 HYPERTENSION ASSOCIATED WITH DIABETES: ICD-10-CM

## 2023-06-06 DIAGNOSIS — E11.59 HYPERTENSION ASSOCIATED WITH DIABETES: ICD-10-CM

## 2023-06-06 DIAGNOSIS — E78.5 HYPERLIPIDEMIA ASSOCIATED WITH TYPE 2 DIABETES MELLITUS: ICD-10-CM

## 2023-06-06 DIAGNOSIS — E11.69 HYPERLIPIDEMIA ASSOCIATED WITH TYPE 2 DIABETES MELLITUS: ICD-10-CM

## 2023-06-06 DIAGNOSIS — E11.9 TYPE 2 DIABETES MELLITUS WITHOUT COMPLICATION, WITHOUT LONG-TERM CURRENT USE OF INSULIN: Primary | ICD-10-CM

## 2023-06-06 PROCEDURE — 3046F HEMOGLOBIN A1C LEVEL >9.0%: CPT | Mod: CPTII,S$GLB,, | Performed by: NURSE PRACTITIONER

## 2023-06-06 PROCEDURE — 3061F NEG MICROALBUMINURIA REV: CPT | Mod: CPTII,S$GLB,, | Performed by: NURSE PRACTITIONER

## 2023-06-06 PROCEDURE — 3075F SYST BP GE 130 - 139MM HG: CPT | Mod: CPTII,S$GLB,, | Performed by: NURSE PRACTITIONER

## 2023-06-06 PROCEDURE — 3066F NEPHROPATHY DOC TX: CPT | Mod: CPTII,S$GLB,, | Performed by: NURSE PRACTITIONER

## 2023-06-06 PROCEDURE — 3288F FALL RISK ASSESSMENT DOCD: CPT | Mod: CPTII,S$GLB,, | Performed by: NURSE PRACTITIONER

## 2023-06-06 PROCEDURE — 1125F PR PAIN SEVERITY QUANTIFIED, PAIN PRESENT: ICD-10-PCS | Mod: CPTII,S$GLB,, | Performed by: NURSE PRACTITIONER

## 2023-06-06 PROCEDURE — 3066F PR DOCUMENTATION OF TREATMENT FOR NEPHROPATHY: ICD-10-PCS | Mod: CPTII,S$GLB,, | Performed by: NURSE PRACTITIONER

## 2023-06-06 PROCEDURE — 3008F PR BODY MASS INDEX (BMI) DOCUMENTED: ICD-10-PCS | Mod: CPTII,S$GLB,, | Performed by: NURSE PRACTITIONER

## 2023-06-06 PROCEDURE — 3008F BODY MASS INDEX DOCD: CPT | Mod: CPTII,S$GLB,, | Performed by: NURSE PRACTITIONER

## 2023-06-06 PROCEDURE — 3288F PR FALLS RISK ASSESSMENT DOCUMENTED: ICD-10-PCS | Mod: CPTII,S$GLB,, | Performed by: NURSE PRACTITIONER

## 2023-06-06 PROCEDURE — 99214 PR OFFICE/OUTPT VISIT, EST, LEVL IV, 30-39 MIN: ICD-10-PCS | Mod: S$GLB,,, | Performed by: NURSE PRACTITIONER

## 2023-06-06 PROCEDURE — 3079F DIAST BP 80-89 MM HG: CPT | Mod: CPTII,S$GLB,, | Performed by: NURSE PRACTITIONER

## 2023-06-06 PROCEDURE — 99999 PR PBB SHADOW E&M-EST. PATIENT-LVL IV: ICD-10-PCS | Mod: PBBFAC,,, | Performed by: NURSE PRACTITIONER

## 2023-06-06 PROCEDURE — 3061F PR NEG MICROALBUMINURIA RESULT DOCUMENTED/REVIEW: ICD-10-PCS | Mod: CPTII,S$GLB,, | Performed by: NURSE PRACTITIONER

## 2023-06-06 PROCEDURE — 1125F AMNT PAIN NOTED PAIN PRSNT: CPT | Mod: CPTII,S$GLB,, | Performed by: NURSE PRACTITIONER

## 2023-06-06 PROCEDURE — 1160F RVW MEDS BY RX/DR IN RCRD: CPT | Mod: CPTII,S$GLB,, | Performed by: NURSE PRACTITIONER

## 2023-06-06 PROCEDURE — 4010F PR ACE/ARB THEARPY RXD/TAKEN: ICD-10-PCS | Mod: CPTII,S$GLB,, | Performed by: NURSE PRACTITIONER

## 2023-06-06 PROCEDURE — 3046F PR MOST RECENT HEMOGLOBIN A1C LEVEL > 9.0%: ICD-10-PCS | Mod: CPTII,S$GLB,, | Performed by: NURSE PRACTITIONER

## 2023-06-06 PROCEDURE — 3075F PR MOST RECENT SYSTOLIC BLOOD PRESS GE 130-139MM HG: ICD-10-PCS | Mod: CPTII,S$GLB,, | Performed by: NURSE PRACTITIONER

## 2023-06-06 PROCEDURE — 1101F PR PT FALLS ASSESS DOC 0-1 FALLS W/OUT INJ PAST YR: ICD-10-PCS | Mod: CPTII,S$GLB,, | Performed by: NURSE PRACTITIONER

## 2023-06-06 PROCEDURE — 3072F PR LOW RISK FOR RETINOPATHY: ICD-10-PCS | Mod: CPTII,S$GLB,, | Performed by: NURSE PRACTITIONER

## 2023-06-06 PROCEDURE — 3079F PR MOST RECENT DIASTOLIC BLOOD PRESSURE 80-89 MM HG: ICD-10-PCS | Mod: CPTII,S$GLB,, | Performed by: NURSE PRACTITIONER

## 2023-06-06 PROCEDURE — 1101F PT FALLS ASSESS-DOCD LE1/YR: CPT | Mod: CPTII,S$GLB,, | Performed by: NURSE PRACTITIONER

## 2023-06-06 PROCEDURE — 1159F PR MEDICATION LIST DOCUMENTED IN MEDICAL RECORD: ICD-10-PCS | Mod: CPTII,S$GLB,, | Performed by: NURSE PRACTITIONER

## 2023-06-06 PROCEDURE — 1159F MED LIST DOCD IN RCRD: CPT | Mod: CPTII,S$GLB,, | Performed by: NURSE PRACTITIONER

## 2023-06-06 PROCEDURE — 99214 OFFICE O/P EST MOD 30 MIN: CPT | Mod: S$GLB,,, | Performed by: NURSE PRACTITIONER

## 2023-06-06 PROCEDURE — 3072F LOW RISK FOR RETINOPATHY: CPT | Mod: CPTII,S$GLB,, | Performed by: NURSE PRACTITIONER

## 2023-06-06 PROCEDURE — 99999 PR PBB SHADOW E&M-EST. PATIENT-LVL IV: CPT | Mod: PBBFAC,,, | Performed by: NURSE PRACTITIONER

## 2023-06-06 PROCEDURE — 1160F PR REVIEW ALL MEDS BY PRESCRIBER/CLIN PHARMACIST DOCUMENTED: ICD-10-PCS | Mod: CPTII,S$GLB,, | Performed by: NURSE PRACTITIONER

## 2023-06-06 PROCEDURE — 4010F ACE/ARB THERAPY RXD/TAKEN: CPT | Mod: CPTII,S$GLB,, | Performed by: NURSE PRACTITIONER

## 2023-06-06 RX ORDER — GLIPIZIDE 10 MG/1
10 TABLET ORAL
Qty: 180 TABLET | Refills: 3
Start: 2023-06-06 | End: 2023-11-12 | Stop reason: SDUPTHER

## 2023-06-06 RX ORDER — PEN NEEDLE, DIABETIC 30 GX3/16"
NEEDLE, DISPOSABLE MISCELLANEOUS
Qty: 100 EACH | Refills: 3 | Status: SHIPPED | OUTPATIENT
Start: 2023-06-06 | End: 2024-02-15

## 2023-06-06 RX ORDER — INSULIN GLARGINE 300 U/ML
INJECTION, SOLUTION SUBCUTANEOUS
Qty: 6 PEN | Refills: 3 | Status: SHIPPED | OUTPATIENT
Start: 2023-06-06 | End: 2023-10-09

## 2023-06-06 NOTE — Clinical Note
Adding toujeo Did not start dexcom- has product Sending note to [facility] for  just in case, pt has iphone- [place]- needs to change password for apple store fyi -needs appt asap  A1c remains too high

## 2023-06-06 NOTE — PATIENT INSTRUCTIONS
Follow up in 4 months w/Irielle   A1c in 6 weeks  A1c prior ( 1-7 days before appt) - 4 months   Optometry 2023     Lab Results   Component Value Date    HGBA1C 12.1 (H) 05/06/2023     Goal less than 7.5%  Goal sugar  no higher than 220    [facility] (supplier)--- dexcom g6     Diabetic educator --- dexcom g6     Start toujeo 22 units at night -insulin at night   Glipizide 10 mg twice a day w/ meals (15-20 mins before food)  Metformin xr 1000 mg twice a day

## 2023-06-07 ENCOUNTER — TELEPHONE (OUTPATIENT)
Dept: DIABETES | Facility: CLINIC | Age: 68
End: 2023-06-07
Payer: MEDICARE

## 2023-06-07 DIAGNOSIS — E11.69 DIABETES MELLITUS TYPE 2 IN OBESE: ICD-10-CM

## 2023-06-07 DIAGNOSIS — E66.9 DIABETES MELLITUS TYPE 2 IN OBESE: ICD-10-CM

## 2023-06-07 RX ORDER — METFORMIN HYDROCHLORIDE 500 MG/1
1000 TABLET, EXTENDED RELEASE ORAL 2 TIMES DAILY
Qty: 360 TABLET | Refills: 1 | Status: SHIPPED | OUTPATIENT
Start: 2023-06-07 | End: 2023-10-09

## 2023-06-07 NOTE — TELEPHONE ENCOUNTER
Refill Decision Note   Sanchez Purcell  is requesting a refill authorization.  Brief Assessment and Rationale for Refill:  Approve     Medication Therapy Plan:         Comments:     Note composed:3:03 PM 06/07/2023             Appointments     Last Visit   5/4/2023 Jamin Anthony MD   Next Visit   8/4/2023 Jamin Anthony MD

## 2023-06-07 NOTE — TELEPHONE ENCOUNTER
No care due was identified.  [facility] Embedded Care Due Messages. Reference number: [id number].   6/07/2023 2:41:44 PM CDT

## 2023-06-08 ENCOUNTER — TELEPHONE (OUTPATIENT)
Dept: DIABETES | Facility: CLINIC | Age: 68
End: 2023-06-08
Payer: MEDICARE

## 2023-06-12 ENCOUNTER — TELEPHONE (OUTPATIENT)
Dept: DIABETES | Facility: CLINIC | Age: 68
End: 2023-06-12
Payer: MEDICARE

## 2023-06-15 ENCOUNTER — CLINICAL SUPPORT (OUTPATIENT)
Dept: DIABETES | Facility: CLINIC | Age: 68
End: 2023-06-15
Payer: MEDICARE

## 2023-06-15 DIAGNOSIS — E11.9 TYPE 2 DIABETES MELLITUS WITHOUT COMPLICATION, WITHOUT LONG-TERM CURRENT USE OF INSULIN: ICD-10-CM

## 2023-06-15 PROCEDURE — 99999 PR PBB SHADOW E&M-EST. PATIENT-LVL II: ICD-10-PCS | Mod: PBBFAC,,,

## 2023-06-15 PROCEDURE — 99999 PR PBB SHADOW E&M-EST. PATIENT-LVL II: CPT | Mod: PBBFAC,,,

## 2023-06-15 PROCEDURE — G0108 DIAB MANAGE TRN  PER INDIV: HCPCS | Mod: S$GLB,,, | Performed by: NURSE PRACTITIONER

## 2023-06-15 PROCEDURE — G0108 PR DIAB MANAGE TRN  PER INDIV: ICD-10-PCS | Mod: S$GLB,,, | Performed by: NURSE PRACTITIONER

## 2023-06-22 VITALS — WEIGHT: 217 LBS | BODY MASS INDEX: 32.99 KG/M2

## 2023-06-22 NOTE — PROGRESS NOTES
Diabetes Care Specialist Progress Note  Author: Lulú Mathew RN  Date: 6/22/2023    Program Intake  Reason for Diabetes Program Visit:: Initial Diabetes Assessment  Current diabetes risk level:: high  In the last 12 months, have you:: used emergency room services  Was the ER or hospital admission related to diabetes?: Yes  Permission to speak with others about care:: yes    Lab Results   Component Value Date    HGBA1C 12.1 (H) 05/06/2023       Clinical    Patient Health Rating  Compared to other people your age, how would you rate your health?: Good    Problem Review  Reviewed Problem List with Patient: yes  Active comorbidities affecting diabetes self-care.: yes  Comorbidities: Hypertension, Cancer, Cardiovascular Disease  Reviewed health maintenance: yes    Clinical Assessment  Current Diabetes Treatment: Oral Medication, Diet, Injectable, Exercise, Insulin  Have you ever experienced hypoglycemia (low blood sugar)?: no  Have you ever experienced hyperglycemia (high blood sugar)?: yes  In the last month, how often have you experienced high blood sugar?: more than once a day  Are you able to tell when your blood sugar is high?: No (comment)  Have you ever been hospitalized because your blood sugar was high?: yes (see comments)    Medication Information  How do you obtain your medications?: Patient drives  How many days a week do you miss your medications?:  (pt is not taking Jardiance of Novolog)  Do you sometimes have difficulty refilling your medications?: No  Medication adherence impacting ability to self-manage diabetes?: Yes (fear of needles)    Labs  Do you have regular lab work to monitor your medications?: Yes  Type of Regular Lab Work: A1c, BMP  Where do you get your labs drawn?: [facility]  Lab Compliance Barriers: No    Nutritional Status  Diet: Regular  Meal Plan 24 Hour Recall: Breakfast, Lunch, Dinner, Snack  Meal Plan 24 Hour Recall - Breakfast: 1 donut   usually skips  Meal Plan 24 Hour Recall -  Lunch: bbq ribs, potato salad, beans  Meal Plan 24 Hour Recall - Dinner: steak, potatoes, lobster  Meal Plan 24 Hour Recall - Snack: pork skins     drinks water, sprite, sugar in coffee  Change in appetite?: No  Recent Changes in Weight: No Recent Weight Change  Current nutritional status an area of need that is impacting patient's ability to self-manage diabetes?: Yes    Additional Social History    Support  Does anyone support you with your diabetes care?: yes  Who supports you?: spouse  Who takes you to your medical appointments?: self  Does the current support meet the patient's needs?: Yes  Is Support an area impacting ability to self-manage diabetes?: No    Access to Mass Media & Technology  Does the patient have access to any of the following devices or technologies?: Smart phone  Media or technology needs impacting ability to self-manage diabetes?: No    Cognitive/Behavioral Health  Alert and Oriented: Yes  Difficulty Thinking: No  Requires Prompting: No  Requires assistance for routine expression?: No  Cognitive or behavioral barriers impacting ability to self-manage diabetes?: No    Culture/Nondenominational  Culture or Gnosticist beliefs that may impact ability to access healthcare: No    Communication  Language preference: English  Hearing Problems: No  Vision Problems: No  Communication needs impacting ability to self-manage diabetes?: No    Health Literacy  Preferred Learning Method: Face to Face, Demonstration, Reading Materials  How often do you need to have someone help you read instructions, pamphlets, or written material from your doctor or pharmacy?: Never  Health literacy needs impacting ability to self-manage diabetes?: No      Diabetes Self-Management Skills Assessment    Diabetes Disease Process/Treatment Options  Patient/caregiver able to state what happens when someone has diabetes.: no  Patient/caregiver knows what type of diabetes they have.: yes  Diabetes Type : Type II  Patient/caregiver able to  identify at least three signs and symptoms of diabetes.: no  Patient able to identify at least three risk factors for diabetes.: no  Diabetes Disease Process/Treatment Options: Skills Assessment Completed: Yes  Assessment indicates:: Knowledge deficit, Instruction Needed  Area of need?: Yes    Nutrition/Healthy Eating  Method of carbohydrate measurement:: no method  Patient can identify foods that impact blood sugar.: no (see comments)  Nutrition/Healthy Eating Skills Assessment Completed:: Yes  Assessment indicates:: Knowledge deficit, Instruction Needed  Area of need?: Yes    Physical Activity/Exercise  Patient's daily activity level:: sedentary  Patient formally exercises outside of work.: no  Reasons for not exercising:: time constraints  Patient can identify forms of physical activity.: no  Patient can identify reasons why exercise/physical activity is important in diabetes management.: no  Physical Activity/Exercise Skills Assessment Completed: : Yes  Assessment indicates:: Knowledge deficit, Instruction Needed  Area of need?: Yes    Medications  Patient is able to describe current diabetes management routine.: yes  Diabetes management routine:: diet, exercise, injectable medications, insulin, oral medications (not taking jardiance or novolog has not started toujeo)  Patient is able to identify current diabetes medications, dosages, and appropriate timing of medications.: yes  Patient understands the purpose of the medications taken for diabetes.: yes  Patient reports problems or concerns with current medication regimen.: yes  Medication regimen problems/concerns:: financial concerns, other (see comments) (needle fobia)  Medication Skills Assessment Completed:: Yes  Assessment indicates:: Knowledge deficit, Instruction Needed  Area of need?: Yes    Home Blood Glucose Monitoring  Patient states that blood sugar is checked at home daily.: no  Reasons for not monitoring:: other (see comments) (needle fobia-  getting dex com)  Home Blood Glucose Monitoring Skills Assessment Completed: : Yes  Assessment indicates:: Knowledge deficit, Instruction Needed  Area of need?: Yes    Acute Complications  Patient is able to identify types of acute complications: No  Acute Complications Skills Assessment Completed: : Yes  Assessment indicates:: Knowledge deficit, Instruction Needed  Area of need?: Yes    Chronic Complications  Patient can identify major chronic complications of diabetes.: no  Patient can identify ways to prevent or delay diabetes complications.: no  Patient is aware that having diabetes increases risk of heart disease?: No  Patient is aware that heart disease is the leading cause of death and disability in people with diabetes?: No  Patient able to state risk factors for heart disease?: No  Patient is taking statin?: Yes  Chronic Complications Skills Assessment Completed: : Yes  Assessment indicates:: Knowledge deficit, Instruction Needed  Area of need?: Yes    Psychosocial/Coping  Patient can identify ways of coping with chronic disease.: yes  Patient-stated ways of coping with chronic disease:: meditation/yoga  Psychosocial/Coping Skills Assessment Completed: : Yes  Assessment indicates:: Adequate understanding  Area of need?: No      Diabetes Self Support Plan         Assessment Summary and Plan    Based on today's diabetes care assessment, the following areas of need were identified:      Social 6/15/2023   Support No   Access to Mass Media/Tech No   Cognitive/Behavioral Health No   Culture/Anglican No   Communication No   Health Literacy No        Clinical 6/15/2023   Medication Adherence Yes   Lab Compliance No   Nutritional Status Yes        Diabetes Self-Management Skills 6/15/2023   Diabetes Disease Process/Treatment Options YesProvided Diabetes management guide and provided instruction regarding SS and consume increased amount of carbohydrate foods and risk factors of diabetes.Instructed patient on what is  Diabetes and the progression of uncontrolled diabetes. Discussed qualifying parameters of diabetes diagnosis. Reviewed/Instructed on disease process. Discussed current treatment options, especially dietary and lifestyle changes as well as possible medication additions and/or changes. Patient verbalizes understanding of received information/education.    Nutrition/Healthy Eating YesEmphasized importance of eliminating all SSB. Discussed SF drink options. Discussed carb vs non-carb foods and reviewed appropriate amounts of carbs to have at meals vs snacks. Recommended 45 - 60 gm carb at meals and 15 gm carb at snacks. Instructed on appropriate label reading and serving sizes of specific carb containing foods. Reviewed need to limit total/saturated fats. Discussed meal plans and snack ideas amenable to pt. Reviewed the plate method. Patient verbalizes understanding of received information/education.        Physical Activity/Exercise YesDiscussed importance of adding physical activity in effort to help manage DM. Goal to increase physical activity to 5 times per week for 30 minutes. Provide different examples of exercise, including walking, jogging, cycling. Also provide pt with examples of chair exercise that can be done.     Medication YesMechanism of action of GLP-1 injection explained. Discussed possible side effects. Reviewed need for rotation of injection sites, appropriate storage, expiration date, safe disposal of used sharps and pen preparation and use. Patient verbalizes understanding of received information/education.     Home Blood Glucose Monitoring YesProvided patient with blood glucose logs, reviewed appropriate timing and frequency to SMBG, education on parameters on when to notify provider and advised patient to bring logs to all appts with PCP/Endocrinologist/Diabetes Care Specialist.Reviewed the importance of self-monitoring blood glucose and keeping logs.Instructed on how to self-monitor blood  "glucose using a home glucometer, how to properly dispose of used strips and lancets after use, and how to appropriately store meter and supplies.    Acute Complications YesDiscussed hypoglycemia vs hyperglycemia symptoms and discussed appropriate treatments for each. Reviewed that pt is at high risk of hypo with current medication regimen. Discussed general vs severe hyperglycemia and risk of DKA.    Chronic Complications YesProvided Diabetes management guide and provided instruction regarding the disease progression if diabetes is uncontrolled. Reviewed ways to decrease risk of chronic complications by healthy eating and having regular activity. Maintaining optimal blood glucose control. Following up with Diabetic team which includes PCP, ENDO MD (if applicable), yearly eye exam, yearly foot exam and Diabetes care specialist .Patient verbalizes understanding of received information/education.  DEXCOM G6 CGM TRAINING     Patient referred to clinic today for Dexcom G6 continuous glucose sensor system training.  Patient arrived with  fully charged and Dexcom G6 mobile chase downloaded to phone. Education was provided using "Quick Start Guide" per Dexcom protocol.     Pt will be using his/her transmitter as the primary .  Overview:  5min glucose reading updates, trending arrows, BG graph screens, battery life indicator, Blue Tooth Symbol.  Menus: trend Graph, start sensor, enter BG, events, Alerts, Settings, Shutdown, Stop Sensor   Settings:                          * Urgent Low: 55 mg/dL                          * Urgent Low Soon: on                          * Low alert: 80 mg/dl repeat 15 min                          * High alert: 250 mg/dl repeat 30 min                          * Rise rate: off                          * Fall Rate: off                          * Signal Loss: on repeat 20 min                          * No Reading: on repeat 20 min                          * Always sound: on      "                     * Alert Schedule:  (instructed on how to set up alert schedule if desired)     Reviewed additional setting options with patient, including Graph Height and Transmitter ID. Transmitter was paired with .    Reviewed where to find sensor insertion time and sensor expiration date.   Discussed no need to calibrate sensor during the entirety of the 10 day wear. Discussed that pt can calibrate sensor if desired, but at that time she will need to continue calibrating every 12 hours for sensor to remain accurate. Reviewed appropriate calibration techniques.  Reviewed sensor site selection. Site selected and prepped using aseptic technique Inserted to left forearm. Transmitter placed in pod and secured.  Practiced sensor pod/transmitter removal from site, and removal of transmitter from sensor pod.  Patient able to demonstrate without difficulty.  Encouraged to review manual prior to starting another sensor.   Reviewed problem solving aspects of sensor transmission/ variables that can disrupt RF transmission.  range 20 feet, but the first 3 hrs keep within 3 feet of transmitter.  Pt instructed on lag time of interstitial fluid from CBG and was advised to tx hypoglycemia and dose insulin based on SMBG values.  Dexcom technical support contact number given and examples of when to contact them discussed. Pt will download software at home for Dexcom download.   Patient advised to always check glucose with glucometer should readings do not match symptoms felt (ex. Hypo or hyperglycemia).       Psychosocial/Coping No          Today's interventions were provided through individual discussion, instruction, and written materials were provided.      Patient verbalized understanding of instruction and written materials.  Pt was able to return back demonstration of instructions today. Patient understood key points, needs reinforcement and further instruction.     Diabetes Self-Management Care  Plan:    Today's Diabetes Self-Management Care Plan was developed with Sanchez's input. Sanchez has agreed to work toward the following goal(s) to improve his/her overall diabetes control.      Care Plan: Diabetes Management   Updates made since 5/23/2023 12:00 AM        Problem: Healthy Eating         Goal: Eat 3 meals daily with 45-60g/3-4 servings of Carbohydrate per meal.    Start Date: 6/15/2023   Expected End Date: 7/27/2023   Priority: High   Barriers: Knowledge deficit   Note:    6/15/23  Instructed pt on the food groups, how to read labels and count carbs. Pt was given sample menus and meal plans as examples. Discussed with pt the importance of eating 3 balanced and portioned meals per day. Discussed with pt how to put his meal together. 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 Pt was to start on Toujeo but he has not to date because  of the needle issue. Instructed pt on the action of Toujeo, how to use the pen, dial up dose, injection sites, rotation of sites, how to inject, storage of pen and disposal of supplies. Pt states that he will start the medication tonight. Pt is not taking Jardiance due to cost issues. Pt is getting a Dex com today to assist with bs's - see notes. Pt will work on meal planning and medication compliance. Discussed with pt that sprite and sugar are carbs, if consumed need to be measured and counted as part of his carbs for that meal.       Task: Reviewed the sources and role of Carbohydrate, Protein, and Fat and how each nutrient impacts blood sugar. Completed 6/22/2023        Task: Provided visual examples using dry measuring cups, food models, and other familiar objects such as computer mouse, deck or cards, tennis ball etc. to help with visualization of portions.         Task: Explained how to count carbohydrates using the food label and the use of dry measuring cups for accurate carb counting. Completed 6/22/2023        Task: Discussed strategies for  choosing healthier menu options when dining out. Completed 6/22/2023        Task: Recommended replacing beverages containing high sugar content with noncaloric/sugar free options and/or water. Completed 6/22/2023        Task: Review the importance of balancing carbohydrates with each meal using portion control techniques to count servings of carbohydrate and label reading to identify serving size and amount of total carbs per serving. Completed 6/22/2023        Task: Provided Sample plate method and reviewed the use of the plate to estimate amounts of carbohydrate per meal. Completed 6/22/2023          Follow Up Plan     Follow up in about 12 days (around 6/27/2023) for changing of dex com sensor, evaluation of blood sugars and meal planning.    Today's care plan and follow up schedule was discussed with patient.  Sanchez verbalized understanding of the care plan, goals, and agrees to follow up plan.        The patient was encouraged to communicate with his/her health care provider/physician and care team regarding his/her condition(s) and treatment.  I provided the patient with my contact information today and encouraged to contact me via phone or [facility] Patient Portal as needed.     Length of Visit   Total Time: 60 Minutes

## 2023-07-22 ENCOUNTER — LAB VISIT (OUTPATIENT)
Dept: LAB | Facility: HOSPITAL | Age: 68
End: 2023-07-22
Attending: NURSE PRACTITIONER
Payer: MEDICARE

## 2023-07-22 DIAGNOSIS — E11.9 TYPE 2 DIABETES MELLITUS WITHOUT COMPLICATION, WITHOUT LONG-TERM CURRENT USE OF INSULIN: ICD-10-CM

## 2023-07-22 LAB
ESTIMATED AVG GLUCOSE: 275 MG/DL (ref 68–131)
HBA1C MFR BLD: 11.2 % (ref 4–5.6)

## 2023-07-22 PROCEDURE — 36415 COLL VENOUS BLD VENIPUNCTURE: CPT | Mod: PO | Performed by: NURSE PRACTITIONER

## 2023-07-22 PROCEDURE — 83036 HEMOGLOBIN GLYCOSYLATED A1C: CPT | Performed by: NURSE PRACTITIONER

## 2023-08-11 ENCOUNTER — PATIENT MESSAGE (OUTPATIENT)
Dept: INTERNAL MEDICINE | Facility: CLINIC | Age: 68
End: 2023-08-11
Payer: MEDICARE

## 2023-08-12 DIAGNOSIS — E11.65 UNCONTROLLED TYPE 2 DIABETES MELLITUS WITH HYPERGLYCEMIA: ICD-10-CM

## 2023-08-12 DIAGNOSIS — I10 ESSENTIAL HYPERTENSION: ICD-10-CM

## 2023-08-12 DIAGNOSIS — E66.9 DIABETES MELLITUS TYPE 2 IN OBESE: ICD-10-CM

## 2023-08-12 DIAGNOSIS — E11.69 DIABETES MELLITUS TYPE 2 IN OBESE: ICD-10-CM

## 2023-08-12 NOTE — TELEPHONE ENCOUNTER
No care due was identified.  [facility] Embedded Care Due Messages. Reference number: [id number].   8/12/2023 9:52:56 AM CDT

## 2023-08-13 RX ORDER — GLIPIZIDE 5 MG/1
TABLET ORAL
Qty: 180 TABLET | Refills: 3 | OUTPATIENT
Start: 2023-08-13

## 2023-08-13 RX ORDER — LOSARTAN POTASSIUM 50 MG/1
50 TABLET ORAL
Qty: 90 TABLET | Refills: 2 | Status: SHIPPED | OUTPATIENT
Start: 2023-08-13

## 2023-08-13 NOTE — TELEPHONE ENCOUNTER
Refill Decision Note   Sanchez Binh  is requesting a refill authorization.  Brief Assessment and Rationale for Refill:  Quick Discontinue  Approve     Medication Therapy Plan:  Glipizide dose adjustment 1/11/23      Comments:     Note composed:2:38 PM 08/13/2023

## 2023-09-06 RX ORDER — GLIPIZIDE 5 MG/1
5 TABLET ORAL 2 TIMES DAILY WITH MEALS
Qty: 180 TABLET | Refills: 1 | OUTPATIENT
Start: 2023-09-06

## 2023-09-07 NOTE — TELEPHONE ENCOUNTER
No care due was identified.  [facility] Embedded Care Due Messages. Reference number: [id number].   9/06/2023 8:58:10 PM CDT

## 2023-09-07 NOTE — TELEPHONE ENCOUNTER
Refill Decision Note   Sanchez Purcell  is requesting a refill authorization.  Brief Assessment and Rationale for Refill:  Quick Discontinue     Medication Therapy Plan:  Per Epic Data,  Glipizide was Increased to 10 mg on 1/11/23.      Comments:     Note composed:9:33 PM 09/06/2023

## 2023-09-11 RX ORDER — FENOFIBRATE 54 MG/1
TABLET ORAL
Qty: 90 TABLET | Refills: 2 | Status: SHIPPED | OUTPATIENT
Start: 2023-09-11

## 2023-09-11 NOTE — TELEPHONE ENCOUNTER
No care due was identified.  [facility] Embedded Care Due Messages. Reference number: [id number].   9/11/2023 11:23:08 AM CDT

## 2023-09-12 NOTE — TELEPHONE ENCOUNTER
Refill Decision Note   Sanchez Purcell  is requesting a refill authorization.  Brief Assessment and Rationale for Refill:  Approve     Medication Therapy Plan:       Medication Reconciliation Completed: No   Comments:     No Care Gaps recommended.     Note composed:9:35 PM 09/11/2023

## 2023-09-12 NOTE — TELEPHONE ENCOUNTER
Refill Routing Note   Medication(s) are not appropriate for processing by [facility] for the following reason(s):      Drug-disease interaction    [facility] action(s):  Defer Care Due:  None identified     Medication Therapy Plan: fenofibrate and Normocytic anemia    Pharmacist review requested: Yes     Appointments  past 12m or future 3m with PCP    Date Provider   Last Visit   5/4/2023 Jamin Anthony MD   Next Visit   Visit date not found Jamin Anthony MD   ED visits in past 90 days: 0        Note composed:8:22 PM 09/11/2023

## 2023-09-27 ENCOUNTER — PATIENT OUTREACH (OUTPATIENT)
Dept: ADMINISTRATIVE | Facility: HOSPITAL | Age: 68
End: 2023-09-27
Payer: MEDICARE

## 2023-10-06 NOTE — PROGRESS NOTES
CHIEF COMPLAINT: Type 2 Diabetes     HPI: Mr. Sanchez Purcell is a 68 y.o. male who was diagnosed with Type 2 DM > 12 years ago.     Worked for power company.   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  Has needle phobia.   A1c remains uncontrolled.   Received dexcom g6 from [facility].   Admits to eating higher carbs at times.     Lab Results   Component Value Date    HGBA1C 11.2 (H) 07/22/2023     PREVIOUS DIABETES MEDICATIONS TRIED  Metformin  Glipizide   Levemir  jardiance    CURRENT DIABETIC MEDS: metformin 1000 mg xr twice a day , glipizide 10 mg twice a day w/ meals     Addendum: injection 1 x a day  Patient is willing and able to use the device  Demonstrated an understanding of the technology and is motivated to use CGM  Patient expected to adhere to a comprehensive diabetes treatment plan and patient has adequate medical supervision  Patient experiences recurrent, multiple impaired awareness of hypoglycemia (hypoglycemia unawareness)    Diabetes Management Status    Statin: Taking  ACE/ARB: Taking    Screening or Prevention Patient's value Goal Complete/Controlled?   HgA1C Testing and Control   Lab Results   Component Value Date    HGBA1C 11.2 (H) 07/22/2023      Annually/Less than 8% No   Lipid profile : 05/06/2023 Annually Yes   LDL control Lab Results   Component Value Date    LDLCALC 57.4 (L) 05/06/2023    Annually/Less than 100 mg/dl  Yes   Nephropathy screening Lab Results   Component Value Date    LABMICR 9.0 05/04/2023     Lab Results   Component Value Date    PROTEINUA Negative 05/24/2010    Annually Yes   Blood pressure BP Readings from Last 1 Encounters:   06/06/23 132/80    Less than 140/90 Yes   Dilated retinal exam : 06/07/2022 Annually Yes   Foot exam   : 06/06/2023 Annually No     REVIEW OF SYSTEMS  General: no weakness, fatigue, weight fluctuations 5#   Eyes: no double or blurred vision, eye pain, or redness  Cardiovascular: no chest pain, palpitations, edema, or murmurs.   Respiratory: no cough or  dyspnea.   GI: no heartburn, nausea, or changes in bowel patterns; good appetite.   Skin: no rashes, dryness, itching, or reactions at insulin injection sites.  Neuro: no numbness, tingling, tremors, or vertigo.   Endocrine: + polyuria, polydipsia, polyphagia, heat or cold intolerance.     Vital Signs  There were no vitals taken for this visit.    Hemoglobin A1C   Date Value Ref Range Status   07/22/2023 11.2 (H) 4.0 - 5.6 % Final     Comment:     ADA Screening Guidelines:  5.7-6.4%  Consistent with prediabetes  >or=6.5%  Consistent with diabetes    High levels of fetal hemoglobin interfere with the HbA1C  assay. Heterozygous hemoglobin variants (HbS, HgC, etc)do  not significantly interfere with this assay.   However, presence of multiple variants may affect accuracy.     05/06/2023 12.1 (H) 4.0 - 5.6 % Final     Comment:     ADA Screening Guidelines:  5.7-6.4%  Consistent with prediabetes  >or=6.5%  Consistent with diabetes    High levels of fetal hemoglobin interfere with the HbA1C  assay. Heterozygous hemoglobin variants (HbS, HgC, etc)do  not significantly interfere with this assay.   However, presence of multiple variants may affect accuracy.     02/25/2023 12.3 (H) 4.0 - 5.6 % Final     Comment:     ADA Screening Guidelines:  5.7-6.4%  Consistent with prediabetes  >or=6.5%  Consistent with diabetes    High levels of fetal hemoglobin interfere with the HbA1C  assay. Heterozygous hemoglobin variants (HbS, HgC, etc)do  not significantly interfere with this assay.   However, presence of multiple variants may affect accuracy.          Chemistry        Component Value Date/Time     05/06/2023 0801    K 3.9 05/06/2023 0801     05/06/2023 0801    CO2 27 05/06/2023 0801    BUN 14 05/06/2023 0801    CREATININE 1.0 05/06/2023 0801     (H) 05/06/2023 0801        Component Value Date/Time    CALCIUM 9.4 05/06/2023 0801    ALKPHOS 116 05/06/2023 0801    AST 17 05/06/2023 0801    ALT 21 05/06/2023 0801     BILITOT 0.7 05/06/2023 0801    ESTGFRAFRICA >60.0 05/31/2022 1032    EGFRNONAA >60.0 05/31/2022 1032           Lab Results   Component Value Date    TSH 1.224 05/06/2023      Lab Results   Component Value Date    CHOL 122 05/06/2023    CHOL 147 05/31/2022    CHOL 159 05/29/2021     Lab Results   Component Value Date    HDL 41 05/06/2023    HDL 40 05/31/2022    HDL 30 (L) 05/29/2021     Lab Results   Component Value Date    LDLCALC 57.4 (L) 05/06/2023    LDLCALC 68.8 05/31/2022    LDLCALC Invalid, Trig>400.0 05/29/2021     Lab Results   Component Value Date    TRIG 118 05/06/2023    TRIG 191 (H) 05/31/2022    TRIG 614 (H) 05/29/2021     Lab Results   Component Value Date    CHOLHDL 33.6 05/06/2023    CHOLHDL 27.2 05/31/2022    CHOLHDL 18.9 (L) 05/29/2021         PHYSICAL EXAMINATION  Constitutional: Appears well, no distress..  Reviewed vitals above.  Eyes: conjunctivae & lids intact; PERRLA, EOMs intact; optic discs   Neck: Supple, trachea midline.   Respiratory: No wheezes, even and unlabored  Cardiovascular: RRR;  no edema or varicosities  Lymph: deferred   Skin: warm and dry; no injection site reactions, no acanthosis nigracans observed.  Neuro:patient alert and cooperative, normal affect; steady gait.  Psychiatric: judgement & insight intact, orientation of time, place & person intact, memory; mood & affect wnl     Diabetes Foot Exam:   Deferred     Assessment/Plan  1. Type 2 diabetes mellitus without complication, with long-term current use of insulin        2. Hyperlipidemia associated with type 2 diabetes mellitus        3. Hypertension associated with diabetes        4. Prostate cancer

## 2023-10-07 ENCOUNTER — LAB VISIT (OUTPATIENT)
Dept: LAB | Facility: HOSPITAL | Age: 68
End: 2023-10-07
Attending: NURSE PRACTITIONER
Payer: MEDICARE

## 2023-10-07 DIAGNOSIS — E11.9 TYPE 2 DIABETES MELLITUS WITHOUT COMPLICATION, WITHOUT LONG-TERM CURRENT USE OF INSULIN: ICD-10-CM

## 2023-10-07 LAB
ESTIMATED AVG GLUCOSE: 272 MG/DL (ref 68–131)
HBA1C MFR BLD: 11.1 % (ref 4–5.6)

## 2023-10-07 PROCEDURE — 36415 COLL VENOUS BLD VENIPUNCTURE: CPT | Mod: PO | Performed by: NURSE PRACTITIONER

## 2023-10-07 PROCEDURE — 83036 HEMOGLOBIN GLYCOSYLATED A1C: CPT | Performed by: NURSE PRACTITIONER

## 2023-10-09 ENCOUNTER — OFFICE VISIT (OUTPATIENT)
Dept: INTERNAL MEDICINE | Facility: CLINIC | Age: 68
End: 2023-10-09
Payer: MEDICARE

## 2023-10-09 VITALS
SYSTOLIC BLOOD PRESSURE: 134 MMHG | OXYGEN SATURATION: 98 % | HEART RATE: 73 BPM | WEIGHT: 218.06 LBS | BODY MASS INDEX: 33.05 KG/M2 | DIASTOLIC BLOOD PRESSURE: 82 MMHG | HEIGHT: 68 IN

## 2023-10-09 DIAGNOSIS — E11.9 TYPE 2 DIABETES MELLITUS WITHOUT COMPLICATION, WITH LONG-TERM CURRENT USE OF INSULIN: Primary | ICD-10-CM

## 2023-10-09 DIAGNOSIS — I15.2 HYPERTENSION ASSOCIATED WITH DIABETES: ICD-10-CM

## 2023-10-09 DIAGNOSIS — E78.5 HYPERLIPIDEMIA ASSOCIATED WITH TYPE 2 DIABETES MELLITUS: ICD-10-CM

## 2023-10-09 DIAGNOSIS — Z79.4 TYPE 2 DIABETES MELLITUS WITHOUT COMPLICATION, WITH LONG-TERM CURRENT USE OF INSULIN: Primary | ICD-10-CM

## 2023-10-09 DIAGNOSIS — C61 PROSTATE CANCER: ICD-10-CM

## 2023-10-09 DIAGNOSIS — E11.59 HYPERTENSION ASSOCIATED WITH DIABETES: ICD-10-CM

## 2023-10-09 DIAGNOSIS — E11.69 HYPERLIPIDEMIA ASSOCIATED WITH TYPE 2 DIABETES MELLITUS: ICD-10-CM

## 2023-10-09 PROCEDURE — 99999 PR PBB SHADOW E&M-EST. PATIENT-LVL III: CPT | Mod: PBBFAC,,, | Performed by: NURSE PRACTITIONER

## 2023-10-09 PROCEDURE — 1101F PT FALLS ASSESS-DOCD LE1/YR: CPT | Mod: CPTII,S$GLB,, | Performed by: NURSE PRACTITIONER

## 2023-10-09 PROCEDURE — 1160F RVW MEDS BY RX/DR IN RCRD: CPT | Mod: CPTII,S$GLB,, | Performed by: NURSE PRACTITIONER

## 2023-10-09 PROCEDURE — 1159F MED LIST DOCD IN RCRD: CPT | Mod: CPTII,S$GLB,, | Performed by: NURSE PRACTITIONER

## 2023-10-09 PROCEDURE — 1159F PR MEDICATION LIST DOCUMENTED IN MEDICAL RECORD: ICD-10-PCS | Mod: CPTII,S$GLB,, | Performed by: NURSE PRACTITIONER

## 2023-10-09 PROCEDURE — 3079F PR MOST RECENT DIASTOLIC BLOOD PRESSURE 80-89 MM HG: ICD-10-PCS | Mod: CPTII,S$GLB,, | Performed by: NURSE PRACTITIONER

## 2023-10-09 PROCEDURE — 4010F PR ACE/ARB THEARPY RXD/TAKEN: ICD-10-PCS | Mod: CPTII,S$GLB,, | Performed by: NURSE PRACTITIONER

## 2023-10-09 PROCEDURE — 3288F PR FALLS RISK ASSESSMENT DOCUMENTED: ICD-10-PCS | Mod: CPTII,S$GLB,, | Performed by: NURSE PRACTITIONER

## 2023-10-09 PROCEDURE — 3079F DIAST BP 80-89 MM HG: CPT | Mod: CPTII,S$GLB,, | Performed by: NURSE PRACTITIONER

## 2023-10-09 PROCEDURE — 99999 PR PBB SHADOW E&M-EST. PATIENT-LVL III: ICD-10-PCS | Mod: PBBFAC,,, | Performed by: NURSE PRACTITIONER

## 2023-10-09 PROCEDURE — 3061F NEG MICROALBUMINURIA REV: CPT | Mod: CPTII,S$GLB,, | Performed by: NURSE PRACTITIONER

## 2023-10-09 PROCEDURE — 3072F PR LOW RISK FOR RETINOPATHY: ICD-10-PCS | Mod: CPTII,S$GLB,, | Performed by: NURSE PRACTITIONER

## 2023-10-09 PROCEDURE — 3008F PR BODY MASS INDEX (BMI) DOCUMENTED: ICD-10-PCS | Mod: CPTII,S$GLB,, | Performed by: NURSE PRACTITIONER

## 2023-10-09 PROCEDURE — 1101F PR PT FALLS ASSESS DOC 0-1 FALLS W/OUT INJ PAST YR: ICD-10-PCS | Mod: CPTII,S$GLB,, | Performed by: NURSE PRACTITIONER

## 2023-10-09 PROCEDURE — 1126F AMNT PAIN NOTED NONE PRSNT: CPT | Mod: CPTII,S$GLB,, | Performed by: NURSE PRACTITIONER

## 2023-10-09 PROCEDURE — 3061F PR NEG MICROALBUMINURIA RESULT DOCUMENTED/REVIEW: ICD-10-PCS | Mod: CPTII,S$GLB,, | Performed by: NURSE PRACTITIONER

## 2023-10-09 PROCEDURE — 3066F PR DOCUMENTATION OF TREATMENT FOR NEPHROPATHY: ICD-10-PCS | Mod: CPTII,S$GLB,, | Performed by: NURSE PRACTITIONER

## 2023-10-09 PROCEDURE — 3288F FALL RISK ASSESSMENT DOCD: CPT | Mod: CPTII,S$GLB,, | Performed by: NURSE PRACTITIONER

## 2023-10-09 PROCEDURE — 99214 PR OFFICE/OUTPT VISIT, EST, LEVL IV, 30-39 MIN: ICD-10-PCS | Mod: S$GLB,,, | Performed by: NURSE PRACTITIONER

## 2023-10-09 PROCEDURE — 3075F PR MOST RECENT SYSTOLIC BLOOD PRESS GE 130-139MM HG: ICD-10-PCS | Mod: CPTII,S$GLB,, | Performed by: NURSE PRACTITIONER

## 2023-10-09 PROCEDURE — 4010F ACE/ARB THERAPY RXD/TAKEN: CPT | Mod: CPTII,S$GLB,, | Performed by: NURSE PRACTITIONER

## 2023-10-09 PROCEDURE — 1126F PR PAIN SEVERITY QUANTIFIED, NO PAIN PRESENT: ICD-10-PCS | Mod: CPTII,S$GLB,, | Performed by: NURSE PRACTITIONER

## 2023-10-09 PROCEDURE — 3008F BODY MASS INDEX DOCD: CPT | Mod: CPTII,S$GLB,, | Performed by: NURSE PRACTITIONER

## 2023-10-09 PROCEDURE — 3066F NEPHROPATHY DOC TX: CPT | Mod: CPTII,S$GLB,, | Performed by: NURSE PRACTITIONER

## 2023-10-09 PROCEDURE — 3072F LOW RISK FOR RETINOPATHY: CPT | Mod: CPTII,S$GLB,, | Performed by: NURSE PRACTITIONER

## 2023-10-09 PROCEDURE — 3075F SYST BP GE 130 - 139MM HG: CPT | Mod: CPTII,S$GLB,, | Performed by: NURSE PRACTITIONER

## 2023-10-09 PROCEDURE — 1160F PR REVIEW ALL MEDS BY PRESCRIBER/CLIN PHARMACIST DOCUMENTED: ICD-10-PCS | Mod: CPTII,S$GLB,, | Performed by: NURSE PRACTITIONER

## 2023-10-09 PROCEDURE — 3046F PR MOST RECENT HEMOGLOBIN A1C LEVEL > 9.0%: ICD-10-PCS | Mod: CPTII,S$GLB,, | Performed by: NURSE PRACTITIONER

## 2023-10-09 PROCEDURE — 99214 OFFICE O/P EST MOD 30 MIN: CPT | Mod: S$GLB,,, | Performed by: NURSE PRACTITIONER

## 2023-10-09 PROCEDURE — 3046F HEMOGLOBIN A1C LEVEL >9.0%: CPT | Mod: CPTII,S$GLB,, | Performed by: NURSE PRACTITIONER

## 2023-10-09 RX ORDER — TIRZEPATIDE 2.5 MG/.5ML
2.5 INJECTION, SOLUTION SUBCUTANEOUS
Qty: 4 PEN | Refills: 6 | Status: SHIPPED | OUTPATIENT
Start: 2023-10-09 | End: 2024-02-15 | Stop reason: SDUPTHER

## 2023-10-09 RX ORDER — DAPAGLIFLOZIN AND METFORMIN HYDROCHLORIDE 5; 1000 MG/1; MG/1
1 TABLET, FILM COATED, EXTENDED RELEASE ORAL EVERY MORNING
Qty: 30 EACH | Refills: 6 | Status: SHIPPED | OUTPATIENT
Start: 2023-10-09 | End: 2024-02-15 | Stop reason: SDUPTHER

## 2023-10-09 RX ORDER — ATORVASTATIN CALCIUM 20 MG/1
20 TABLET, FILM COATED ORAL NIGHTLY
Qty: 90 TABLET | Refills: 3 | Status: SHIPPED | OUTPATIENT
Start: 2023-10-09

## 2023-10-09 NOTE — PATIENT INSTRUCTIONS
Xigduo xr 5/1000 mg daily   Mounjaro 2.5 mg weekly   Stop toujeo   Stop metformin xr   Glipizide 10 mg twice a day     Lab Results   Component Value Date    HGBA1C 11.1 (H) 10/07/2023     Goal less than 7.5%    [email or web address]  Eat fit chase  [employer] chase  [email or web address]    mySugr chase     Goal  no higher than 180/200     Follow up in 4 months w/Irielle  A1c cmp in 6 weeks  A1c in 4 months   Diabetic eye photo 2023

## 2023-11-11 ENCOUNTER — LAB VISIT (OUTPATIENT)
Dept: LAB | Facility: HOSPITAL | Age: 68
End: 2023-11-11
Attending: NURSE PRACTITIONER
Payer: MEDICARE

## 2023-11-11 DIAGNOSIS — Z79.4 TYPE 2 DIABETES MELLITUS WITHOUT COMPLICATION, WITH LONG-TERM CURRENT USE OF INSULIN: ICD-10-CM

## 2023-11-11 DIAGNOSIS — E11.9 TYPE 2 DIABETES MELLITUS WITHOUT COMPLICATION, WITH LONG-TERM CURRENT USE OF INSULIN: ICD-10-CM

## 2023-11-11 LAB
ALBUMIN SERPL BCP-MCNC: 4 G/DL (ref 3.5–5.2)
ALP SERPL-CCNC: 126 U/L (ref 55–135)
ALT SERPL W/O P-5'-P-CCNC: 18 U/L (ref 10–44)
ANION GAP SERPL CALC-SCNC: 10 MMOL/L (ref 8–16)
AST SERPL-CCNC: 15 U/L (ref 10–40)
BILIRUB SERPL-MCNC: 0.4 MG/DL (ref 0.1–1)
BUN SERPL-MCNC: 9 MG/DL (ref 8–23)
CALCIUM SERPL-MCNC: 9.7 MG/DL (ref 8.7–10.5)
CHLORIDE SERPL-SCNC: 103 MMOL/L (ref 95–110)
CO2 SERPL-SCNC: 28 MMOL/L (ref 23–29)
CREAT SERPL-MCNC: 1.1 MG/DL (ref 0.5–1.4)
EST. GFR  (NO RACE VARIABLE): >60 ML/MIN/1.73 M^2
ESTIMATED AVG GLUCOSE: 240 MG/DL (ref 68–131)
GLUCOSE SERPL-MCNC: 141 MG/DL (ref 70–110)
HBA1C MFR BLD: 10 % (ref 4–5.6)
POTASSIUM SERPL-SCNC: 3.7 MMOL/L (ref 3.5–5.1)
PROT SERPL-MCNC: 8.2 G/DL (ref 6–8.4)
SODIUM SERPL-SCNC: 141 MMOL/L (ref 136–145)

## 2023-11-11 PROCEDURE — 80053 COMPREHEN METABOLIC PANEL: CPT | Performed by: NURSE PRACTITIONER

## 2023-11-11 PROCEDURE — 36415 COLL VENOUS BLD VENIPUNCTURE: CPT | Mod: PO | Performed by: NURSE PRACTITIONER

## 2023-11-11 PROCEDURE — 83036 HEMOGLOBIN GLYCOSYLATED A1C: CPT | Performed by: NURSE PRACTITIONER

## 2023-11-12 RX ORDER — GLIPIZIDE 10 MG/1
10 TABLET ORAL
Qty: 180 TABLET | Refills: 3 | Status: SHIPPED | OUTPATIENT
Start: 2023-11-12 | End: 2024-02-15 | Stop reason: SDUPTHER

## 2023-11-12 RX ORDER — GLIPIZIDE 5 MG/1
5 TABLET ORAL
Qty: 60 TABLET | Refills: 11 | OUTPATIENT
Start: 2023-11-12 | End: 2024-11-11

## 2023-11-12 NOTE — TELEPHONE ENCOUNTER
No care due was identified.  [facility] Embedded Care Due Messages. Reference number: [id number].   11/11/2023 9:27:04 PM CST

## 2023-11-12 NOTE — TELEPHONE ENCOUNTER
Refill Routing Note   Medication(s) are not appropriate for processing by [facility] for the following reason(s):      Non-participating provider    [facility] action(s):  Route Care Due:  None identified            Appointments  past 12m or future 3m with PCP    Date Provider   Last Visit   10/9/2023 Wally Dorantes APRN, FNP   Next Visit   2/15/2024 Wally Dorantes APRN, FNP   ED visits in past 90 days: 0        Note composed:8:43 AM 11/12/2023

## 2023-11-13 ENCOUNTER — PATIENT MESSAGE (OUTPATIENT)
Dept: INTERNAL MEDICINE | Facility: CLINIC | Age: 68
End: 2023-11-13
Payer: MEDICARE

## 2023-11-21 ENCOUNTER — TELEPHONE (OUTPATIENT)
Dept: PRIMARY CARE CLINIC | Facility: CLINIC | Age: 68
End: 2023-11-21

## 2024-02-10 ENCOUNTER — LAB VISIT (OUTPATIENT)
Dept: LAB | Facility: HOSPITAL | Age: 69
End: 2024-02-10
Payer: MEDICARE

## 2024-02-10 DIAGNOSIS — E11.9 TYPE 2 DIABETES MELLITUS WITHOUT COMPLICATION, WITH LONG-TERM CURRENT USE OF INSULIN: ICD-10-CM

## 2024-02-10 DIAGNOSIS — Z79.4 TYPE 2 DIABETES MELLITUS WITHOUT COMPLICATION, WITH LONG-TERM CURRENT USE OF INSULIN: ICD-10-CM

## 2024-02-10 LAB
ESTIMATED AVG GLUCOSE: 269 MG/DL (ref 68–131)
HBA1C MFR BLD: 11 % (ref 4–5.6)

## 2024-02-10 PROCEDURE — 83036 HEMOGLOBIN GLYCOSYLATED A1C: CPT | Performed by: NURSE PRACTITIONER

## 2024-02-10 PROCEDURE — 36415 COLL VENOUS BLD VENIPUNCTURE: CPT | Mod: PO | Performed by: NURSE PRACTITIONER

## 2024-02-14 NOTE — PROGRESS NOTES
CHIEF COMPLAINT: Type 2 Diabetes     HPI: Mr. Sanchez Purcell is a 68 y.o. male who was diagnosed with Type 2 DM > 12 years ago.   Mother-  from complications of dm, in  , amputation    Worked for power company.   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  Has needle phobia.   A1c remains uncontrolled.   Not compliant with mounjaro, q other week.   Not interested in cgm.   Not checking BG   Admits to eating higher carbs over the holidays.     Had coffee this am, poured a lot of regular sugar.     Did not start xigduo     Sat, sun- breakfast   During the week no breakfast   Eats vegetables   Drinks: occ coke  Water and lemon  Snacks: corn chips, lays potato chips, chocolate here and there   Eats out for lunch- chicken-fried  Salad: Lettuce, tomatoes, salt/pepper, granados or ranch   [place], extra granados  Still works full time    Lab Results   Component Value Date    HGBA1C 11.0 (H) 02/10/2024     PREVIOUS DIABETES MEDICATIONS TRIED  Metformin  Glipizide   Levemir  Jardiance  Xigduo-did not start, pa needed   Mounjaro - not every week, worried about wt loss    CURRENT DIABETIC MEDS: metformin 1000 mg xr twice a day -did not start combo farxiga/metformin, glipizide 10 mg twice a day w/ meals     Diabetes Management Status    Statin: Taking  ACE/ARB: Taking    Screening or Prevention Patient's value Goal Complete/Controlled?   HgA1C Testing and Control   Lab Results   Component Value Date    HGBA1C 11.0 (H) 02/10/2024      Annually/Less than 8% No   Lipid profile : 2023 Annually Yes   LDL control Lab Results   Component Value Date    LDLCALC 57.4 (L) 2023    Annually/Less than 100 mg/dl  Yes   Nephropathy screening Lab Results   Component Value Date    LABMICR 9.0 2023     Lab Results   Component Value Date    PROTEINUA Negative 2010    Annually Yes   Blood pressure BP Readings from Last 1 Encounters:   10/09/23 134/82    Less than 140/90 Yes   Dilated retinal exam : 2022 Annually Yes   Foot exam   :  "06/06/2023 Annually No     REVIEW OF SYSTEMS  General: no weakness, fatigue, weight fluctuations 1#  Eyes: no double or blurred vision, eye pain, or redness  Cardiovascular: no chest pain, palpitations, edema, or murmurs.   Respiratory: no cough or dyspnea.   GI: no heartburn, nausea, or changes in bowel patterns; good appetite.   Skin: no rashes, dryness, itching, or reactions at insulin injection sites.  Neuro: no numbness, tingling, tremors, or vertigo.   Endocrine: + polyuria, polydipsia, polyphagia, heat or cold intolerance.     Vital Signs  BP (!) 142/84 (BP Location: Left arm, Patient Position: Sitting, BP Method: Large (Manual))   Pulse 60   Ht 5' 8" (1.727 m)   Wt 98.7 kg (217 lb 9.5 oz)   SpO2 97%   BMI 33.09 kg/m²     Hemoglobin A1C   Date Value Ref Range Status   02/10/2024 11.0 (H) 4.0 - 5.6 % Final     Comment:     ADA Screening Guidelines:  5.7-6.4%  Consistent with prediabetes  >or=6.5%  Consistent with diabetes    High levels of fetal hemoglobin interfere with the HbA1C  assay. Heterozygous hemoglobin variants (HbS, HgC, etc)do  not significantly interfere with this assay.   However, presence of multiple variants may affect accuracy.     11/11/2023 10.0 (H) 4.0 - 5.6 % Final     Comment:     ADA Screening Guidelines:  5.7-6.4%  Consistent with prediabetes  >or=6.5%  Consistent with diabetes    High levels of fetal hemoglobin interfere with the HbA1C  assay. Heterozygous hemoglobin variants (HbS, HgC, etc)do  not significantly interfere with this assay.   However, presence of multiple variants may affect accuracy.     10/07/2023 11.1 (H) 4.0 - 5.6 % Final     Comment:     ADA Screening Guidelines:  5.7-6.4%  Consistent with prediabetes  >or=6.5%  Consistent with diabetes    High levels of fetal hemoglobin interfere with the HbA1C  assay. Heterozygous hemoglobin variants (HbS, HgC, etc)do  not significantly interfere with this assay.   However, presence of multiple variants may affect accuracy.   "        Chemistry        Component Value Date/Time     11/11/2023 0851    K 3.7 11/11/2023 0851     11/11/2023 0851    CO2 28 11/11/2023 0851    BUN 9 11/11/2023 0851    CREATININE 1.1 11/11/2023 0851     (H) 11/11/2023 0851        Component Value Date/Time    CALCIUM 9.7 11/11/2023 0851    ALKPHOS 126 11/11/2023 0851    AST 15 11/11/2023 0851    ALT 18 11/11/2023 0851    BILITOT 0.4 11/11/2023 0851    ESTGFRAFRICA >60.0 05/31/2022 1032    EGFRNONAA >60.0 05/31/2022 1032           Lab Results   Component Value Date    TSH 1.224 05/06/2023      Lab Results   Component Value Date    CHOL 122 05/06/2023    CHOL 147 05/31/2022    CHOL 159 05/29/2021     Lab Results   Component Value Date    HDL 41 05/06/2023    HDL 40 05/31/2022    HDL 30 (L) 05/29/2021     Lab Results   Component Value Date    LDLCALC 57.4 (L) 05/06/2023    LDLCALC 68.8 05/31/2022    LDLCALC Invalid, Trig>400.0 05/29/2021     Lab Results   Component Value Date    TRIG 118 05/06/2023    TRIG 191 (H) 05/31/2022    TRIG 614 (H) 05/29/2021     Lab Results   Component Value Date    CHOLHDL 33.6 05/06/2023    CHOLHDL 27.2 05/31/2022    CHOLHDL 18.9 (L) 05/29/2021         PHYSICAL EXAMINATION  Constitutional: Appears well, no distress..  Reviewed vitals above.  Eyes: conjunctivae & lids intact; PERRLA, EOMs intact; optic discs   Neck: Supple, trachea midline.   Respiratory: No wheezes, even and unlabored  Cardiovascular: RRR;  no edema or varicosities  Lymph: deferred   Skin: warm and dry; no injection site reactions, no acanthosis nigracans observed.  Neuro:patient alert and cooperative, normal affect; steady gait.  Psychiatric: judgement & insight intact, orientation of time, place & person intact, memory; mood & affect wnl     Diabetes Foot Exam:   Deferred     Assessment/Plan  1. Type 2 diabetes mellitus without complication, with long-term current use of insulin  Hemoglobin A1C    Ambulatory referral/consult to Diabetes Education     POCT Glucose, Hand-Held Device      2. Prostate cancer        3. Hypertension associated with diabetes        4. Hyperlipidemia associated with type 2 diabetes mellitus          1-4. Follow up in 3 months w/ me   Discussed cgm  F/u] with urology   Bp elevated, repeat   Lab Results   Component Value Date    LDLCALC 57.4 (L) 05/06/2023     At goal   Lipid next time  DE -dietary habits  Poct glucose 317 mg/dl   Add toujeo 20 units at night   Discussed goal  no higher than 180/200

## 2024-02-15 ENCOUNTER — OFFICE VISIT (OUTPATIENT)
Dept: INTERNAL MEDICINE | Facility: CLINIC | Age: 69
End: 2024-02-15
Payer: MEDICARE

## 2024-02-15 VITALS
DIASTOLIC BLOOD PRESSURE: 88 MMHG | HEIGHT: 68 IN | BODY MASS INDEX: 32.98 KG/M2 | HEART RATE: 60 BPM | WEIGHT: 217.63 LBS | OXYGEN SATURATION: 97 % | SYSTOLIC BLOOD PRESSURE: 138 MMHG

## 2024-02-15 DIAGNOSIS — E11.69 HYPERLIPIDEMIA ASSOCIATED WITH TYPE 2 DIABETES MELLITUS: ICD-10-CM

## 2024-02-15 DIAGNOSIS — E11.59 HYPERTENSION ASSOCIATED WITH DIABETES: ICD-10-CM

## 2024-02-15 DIAGNOSIS — I15.2 HYPERTENSION ASSOCIATED WITH DIABETES: ICD-10-CM

## 2024-02-15 DIAGNOSIS — E78.5 HYPERLIPIDEMIA ASSOCIATED WITH TYPE 2 DIABETES MELLITUS: ICD-10-CM

## 2024-02-15 DIAGNOSIS — C61 PROSTATE CANCER: ICD-10-CM

## 2024-02-15 DIAGNOSIS — E11.9 TYPE 2 DIABETES MELLITUS WITHOUT COMPLICATION, WITH LONG-TERM CURRENT USE OF INSULIN: Primary | ICD-10-CM

## 2024-02-15 DIAGNOSIS — Z79.4 TYPE 2 DIABETES MELLITUS WITHOUT COMPLICATION, WITH LONG-TERM CURRENT USE OF INSULIN: Primary | ICD-10-CM

## 2024-02-15 LAB — GLUCOSE SERPL-MCNC: 317 MG/DL (ref 70–110)

## 2024-02-15 PROCEDURE — 99214 OFFICE O/P EST MOD 30 MIN: CPT | Mod: S$GLB,,, | Performed by: NURSE PRACTITIONER

## 2024-02-15 PROCEDURE — 3046F HEMOGLOBIN A1C LEVEL >9.0%: CPT | Mod: CPTII,S$GLB,, | Performed by: NURSE PRACTITIONER

## 2024-02-15 PROCEDURE — 1159F MED LIST DOCD IN RCRD: CPT | Mod: CPTII,S$GLB,, | Performed by: NURSE PRACTITIONER

## 2024-02-15 PROCEDURE — 3075F SYST BP GE 130 - 139MM HG: CPT | Mod: CPTII,S$GLB,, | Performed by: NURSE PRACTITIONER

## 2024-02-15 PROCEDURE — 3288F FALL RISK ASSESSMENT DOCD: CPT | Mod: CPTII,S$GLB,, | Performed by: NURSE PRACTITIONER

## 2024-02-15 PROCEDURE — 3008F BODY MASS INDEX DOCD: CPT | Mod: CPTII,S$GLB,, | Performed by: NURSE PRACTITIONER

## 2024-02-15 PROCEDURE — 3079F DIAST BP 80-89 MM HG: CPT | Mod: CPTII,S$GLB,, | Performed by: NURSE PRACTITIONER

## 2024-02-15 PROCEDURE — 1126F AMNT PAIN NOTED NONE PRSNT: CPT | Mod: CPTII,S$GLB,, | Performed by: NURSE PRACTITIONER

## 2024-02-15 PROCEDURE — 82962 GLUCOSE BLOOD TEST: CPT | Mod: S$GLB,,, | Performed by: NURSE PRACTITIONER

## 2024-02-15 PROCEDURE — 99999 PR PBB SHADOW E&M-EST. PATIENT-LVL III: CPT | Mod: PBBFAC,,, | Performed by: NURSE PRACTITIONER

## 2024-02-15 PROCEDURE — 1160F RVW MEDS BY RX/DR IN RCRD: CPT | Mod: CPTII,S$GLB,, | Performed by: NURSE PRACTITIONER

## 2024-02-15 PROCEDURE — 1101F PT FALLS ASSESS-DOCD LE1/YR: CPT | Mod: CPTII,S$GLB,, | Performed by: NURSE PRACTITIONER

## 2024-02-15 RX ORDER — PEN NEEDLE, DIABETIC 30 GX3/16"
NEEDLE, DISPOSABLE MISCELLANEOUS
Qty: 100 EACH | Refills: 3 | Status: SHIPPED | OUTPATIENT
Start: 2024-02-15

## 2024-02-15 RX ORDER — DAPAGLIFLOZIN AND METFORMIN HYDROCHLORIDE 5; 1000 MG/1; MG/1
1 TABLET, FILM COATED, EXTENDED RELEASE ORAL EVERY MORNING
Qty: 30 EACH | Refills: 6 | Status: SHIPPED | OUTPATIENT
Start: 2024-02-15 | End: 2024-05-28 | Stop reason: SDUPTHER

## 2024-02-15 RX ORDER — GLIPIZIDE 10 MG/1
10 TABLET ORAL
Qty: 180 TABLET | Refills: 3 | Status: SHIPPED | OUTPATIENT
Start: 2024-02-15 | End: 2024-05-28 | Stop reason: SDUPTHER

## 2024-02-15 RX ORDER — INSULIN GLARGINE 300 U/ML
INJECTION, SOLUTION SUBCUTANEOUS
Qty: 3 PEN | Refills: 5 | Status: SHIPPED | OUTPATIENT
Start: 2024-02-15 | End: 2024-04-02 | Stop reason: SDDI

## 2024-02-15 RX ORDER — TIRZEPATIDE 2.5 MG/.5ML
2.5 INJECTION, SOLUTION SUBCUTANEOUS
Qty: 4 PEN | Refills: 6 | Status: SHIPPED | OUTPATIENT
Start: 2024-02-15 | End: 2024-04-02

## 2024-02-15 NOTE — PATIENT INSTRUCTIONS
Follow up in 3 months w/Irielle   A1c prior -3 months   DE education : insulin start     New medication: toujeo 20 units at night (24 hour insulin)  Mounjaro 2.5 mg weekly   Xigduo 5/1000 mg daily   Stop metformin   Glipizide 10 mg twice a day     Goal  no higher than 200     [email or web address]  Eat fit chase  [employer]al chase  [email or web address]    MySugr chase

## 2024-03-17 DIAGNOSIS — E66.9 DIABETES MELLITUS TYPE 2 IN OBESE: ICD-10-CM

## 2024-03-17 DIAGNOSIS — E11.69 DIABETES MELLITUS TYPE 2 IN OBESE: ICD-10-CM

## 2024-03-17 NOTE — TELEPHONE ENCOUNTER
Care Due:                  Date            Visit Type   Department     Provider  --------------------------------------------------------------------------------                                EP -                              PRIMARY      [facility] INTERNAL  Last Visit: 05-      CARE ([facility])   EMMANUEL Anthony                               -                              PRIMARY      [facility] INTERNAL  Next Visit: 04-      CARE ([facility])   EMMANUEL Anthony                                                            Last  Test          Frequency    Reason                     Performed    Due Date  --------------------------------------------------------------------------------    CBC.........  12 months..  fenofibrate..............  05- 05-    Lipid Panel.  12 months..  fenofibrate..............  05- 05-    [employer] Embedded Care Due Messages. Reference number: [id number].   3/17/2024 1:12:09 PM CDT

## 2024-03-17 NOTE — TELEPHONE ENCOUNTER
No care due was identified.  [facility] Embedded Care Due Messages. Reference number: [id number].   3/17/2024 1:15:39 PM CDT

## 2024-03-18 RX ORDER — METFORMIN HYDROCHLORIDE 500 MG/1
TABLET, EXTENDED RELEASE ORAL
Qty: 360 TABLET | Refills: 1 | OUTPATIENT
Start: 2024-03-18

## 2024-03-18 NOTE — TELEPHONE ENCOUNTER
Refill Decision Note  Carol DC. Inappropriate Request     Sanchez Purcell  is requesting a refill authorization.  Brief Assessment and Rationale for Refill:  Quick Discontinue     Medication Therapy Plan:  prescription was discontinued on 10/9/2023 by Wally Dorantes APRN, FNP    Medication Reconciliation Completed: No   Comments:      Provider Staff:  Action required for this patient    Requires labs      Please see care gap opportunities below in Care Due Message.    Thanks!  [facility]     Appointments      Date Provider   Last Visit   5/4/2023 Jamin Anthony MD   Next Visit   3/17/2024 Jamin Anthony MD          Note composed:9:09 AM 03/18/2024

## 2024-03-18 NOTE — TELEPHONE ENCOUNTER
Refill Decision Note  Quick DC. Duplicate Request-  med pended in previous encounter, awaiting assessment     Sanchez Fuentesnt  is requesting a refill authorization.  Brief Assessment and Rationale for Refill:  Quick Discontinue     Medication Therapy Plan:  DUPLICATE ENCOUNTER    Medication Reconciliation Completed: No   Comments:     No Care Gaps recommended.     Note composed:9:07 AM 03/18/2024

## 2024-04-02 ENCOUNTER — OFFICE VISIT (OUTPATIENT)
Dept: INTERNAL MEDICINE | Facility: CLINIC | Age: 69
End: 2024-04-02
Payer: MEDICARE

## 2024-04-02 ENCOUNTER — TELEPHONE (OUTPATIENT)
Dept: UROLOGY | Facility: CLINIC | Age: 69
End: 2024-04-02
Payer: MEDICARE

## 2024-04-02 VITALS
BODY MASS INDEX: 32.91 KG/M2 | HEART RATE: 74 BPM | DIASTOLIC BLOOD PRESSURE: 84 MMHG | HEIGHT: 68 IN | OXYGEN SATURATION: 100 % | SYSTOLIC BLOOD PRESSURE: 138 MMHG | WEIGHT: 217.13 LBS

## 2024-04-02 DIAGNOSIS — E11.69 HYPERLIPIDEMIA ASSOCIATED WITH TYPE 2 DIABETES MELLITUS: ICD-10-CM

## 2024-04-02 DIAGNOSIS — G89.29 CHRONIC LEFT SHOULDER PAIN: ICD-10-CM

## 2024-04-02 DIAGNOSIS — Z79.4 TYPE 2 DIABETES MELLITUS WITH HYPERGLYCEMIA, WITH LONG-TERM CURRENT USE OF INSULIN: Primary | ICD-10-CM

## 2024-04-02 DIAGNOSIS — Z12.11 SCREENING FOR COLON CANCER: ICD-10-CM

## 2024-04-02 DIAGNOSIS — E11.59 HYPERTENSION ASSOCIATED WITH DIABETES: ICD-10-CM

## 2024-04-02 DIAGNOSIS — I15.2 HYPERTENSION ASSOCIATED WITH DIABETES: ICD-10-CM

## 2024-04-02 DIAGNOSIS — E11.65 TYPE 2 DIABETES MELLITUS WITH HYPERGLYCEMIA, WITH LONG-TERM CURRENT USE OF INSULIN: Primary | ICD-10-CM

## 2024-04-02 DIAGNOSIS — M25.512 CHRONIC LEFT SHOULDER PAIN: ICD-10-CM

## 2024-04-02 DIAGNOSIS — E78.5 HYPERLIPIDEMIA ASSOCIATED WITH TYPE 2 DIABETES MELLITUS: ICD-10-CM

## 2024-04-02 DIAGNOSIS — C61 PROSTATE CANCER: ICD-10-CM

## 2024-04-02 PROCEDURE — 4010F ACE/ARB THERAPY RXD/TAKEN: CPT | Mod: CPTII,S$GLB,, | Performed by: INTERNAL MEDICINE

## 2024-04-02 PROCEDURE — 1159F MED LIST DOCD IN RCRD: CPT | Mod: CPTII,S$GLB,, | Performed by: INTERNAL MEDICINE

## 2024-04-02 PROCEDURE — 99999 PR PBB SHADOW E&M-EST. PATIENT-LVL V: CPT | Mod: PBBFAC,,, | Performed by: INTERNAL MEDICINE

## 2024-04-02 PROCEDURE — 99214 OFFICE O/P EST MOD 30 MIN: CPT | Mod: S$GLB,,, | Performed by: INTERNAL MEDICINE

## 2024-04-02 PROCEDURE — 3046F HEMOGLOBIN A1C LEVEL >9.0%: CPT | Mod: CPTII,S$GLB,, | Performed by: INTERNAL MEDICINE

## 2024-04-02 PROCEDURE — 3008F BODY MASS INDEX DOCD: CPT | Mod: CPTII,S$GLB,, | Performed by: INTERNAL MEDICINE

## 2024-04-02 PROCEDURE — 1101F PT FALLS ASSESS-DOCD LE1/YR: CPT | Mod: CPTII,S$GLB,, | Performed by: INTERNAL MEDICINE

## 2024-04-02 PROCEDURE — 3075F SYST BP GE 130 - 139MM HG: CPT | Mod: CPTII,S$GLB,, | Performed by: INTERNAL MEDICINE

## 2024-04-02 PROCEDURE — 1125F AMNT PAIN NOTED PAIN PRSNT: CPT | Mod: CPTII,S$GLB,, | Performed by: INTERNAL MEDICINE

## 2024-04-02 PROCEDURE — 3079F DIAST BP 80-89 MM HG: CPT | Mod: CPTII,S$GLB,, | Performed by: INTERNAL MEDICINE

## 2024-04-02 PROCEDURE — 3288F FALL RISK ASSESSMENT DOCD: CPT | Mod: CPTII,S$GLB,, | Performed by: INTERNAL MEDICINE

## 2024-04-02 RX ORDER — TIRZEPATIDE 5 MG/.5ML
5 INJECTION, SOLUTION SUBCUTANEOUS
Qty: 4 PEN | Refills: 11 | Status: SHIPPED | OUTPATIENT
Start: 2024-04-02 | End: 2024-05-28 | Stop reason: SDUPTHER

## 2024-04-02 NOTE — PATIENT INSTRUCTIONS
Schedule urology appointment  Schedule optometry appointment    Labwork scheduled for Saturday, 5/25/2024 at 8:00 AM in [facility]    Increase mounjaro from 2.5 mg weekly to 5 mg weekly - new prescription sent to pharmacy. Finish off your 2.5 mg weekly pens and then your next fill should be the 5 mg weekly pens.     Make sure to go fill your xigduo (dapagliflozin-metformin) and glipizide at your pharmacy.

## 2024-04-02 NOTE — PROGRESS NOTES
Subjective:       Patient ID: Sanchez Purcell is a 68 y.o. male.    Chief Complaint: Follow-up      HPI  Sanchez Purcell is a 68 y.o. year old male with t2dm (last A1c 11.0), HTN, HLD, history of prostate cancer (s/p prostatectomy) presents for annual exam.  Since last visit patient has started taking Mounjaro 2.5 mg weekly despite his needle phobia.  Patient also has started taking Toujeo 20 units nightly.  Patient continues to take glipizide, metformin, dapagliflozin.      Ran out of glipizide and xigduo - has refills at pharmacy.  Never started taking Toujeo  Is taking mounjaro 2.5 mg weekly - Monday's at 9pm. Tolerating this dose.     Review of Systems   Constitutional:  Positive for fatigue (chronic). Negative for activity change, appetite change, chills, fever and unexpected weight change.   HENT:  Negative for congestion, rhinorrhea and sore throat.    Eyes:  Positive for visual disturbance (blurry vision).   Respiratory:  Negative for shortness of breath.    Cardiovascular:  Negative for chest pain.   Gastrointestinal:  Negative for abdominal pain, diarrhea, nausea and vomiting.   Genitourinary:  Negative for difficulty urinating and dysuria.   Musculoskeletal:  Negative for arthralgias, back pain and myalgias.   Skin:  Negative for color change and rash.   Neurological:  Negative for dizziness, weakness and headaches.         Past Medical History:   Diagnosis Date    Diabetes mellitus type II     Heart murmur 1990's    Hyperlipidemia     Hypertension     Prostate cancer         Prior to Admission medications    Medication Sig Start Date End Date Taking? Authorizing Provider   atorvastatin (LIPITOR) 20 MG tablet Take 1 tablet (20 mg total) by mouth every evening. 10/9/23  Yes Wally Dorantes, APRN, FNP   fenofibrate (TRICOR) 54 MG tablet TAKE 1 TABLET(54 MG) BY MOUTH EVERY DAY 9/11/23  Yes Jamin Anthony MD   glipiZIDE (GLUCOTROL) 10 MG tablet Take 1 tablet (10 mg total) by mouth 2 (two) times daily before  "meals. 2/15/24 2/14/25 Yes Wally Dorantes APRN, CHRISTINAP   losartan (COZAAR) 50 MG tablet TAKE 1 TABLET(50 MG) BY MOUTH EVERY DAY 8/13/23  Yes Jamin Anthony MD   pen needle, diabetic (BD RICHY 2ND GEN PEN NEEDLE) 32 gauge x 5/32" Ndle Use 1x a day. 2/15/24  Yes Wally Dorantes APRN, FNP   tirzepatide (MOUNJARO) 2.5 mg/0.5 mL PnIj Inject 2.5 mg into the skin every 7 days. 2/15/24  Yes Wally Dorantes APRN, CHRISTINAP   dapaglifloz propaned-metformin (XIGDUO XR) 5-1,000 mg Take 1 tablet by mouth every morning. 2/15/24   Wally Dorantes APRN, CHRISTINAP   insulin glargine, TOUJEO, (TOUJEO SOLOSTAR U-300 INSULIN) 300 unit/mL (1.5 mL) InPn pen Inject 20 units at night  Patient not taking: Reported on 4/2/2024 2/15/24   Wally Dorantes APRN, CHRISTINAP        Past medical history, surgical history, and family medical history reviewed and updated as appropriate.    Medications and allergies reviewed.     Objective:          Vitals:    04/02/24 1426 04/02/24 1522   BP: (!) 142/70 138/84   BP Location: Right arm    Patient Position: Sitting    Pulse: 74    SpO2: 100%    Weight: 98.5 kg (217 lb 2.5 oz)    Height: 5' 8" (1.727 m)      Body mass index is 33.02 kg/m².  Physical Exam  Constitutional:       General: He is not in acute distress.     Appearance: He is well-developed.   HENT:      Head: Normocephalic and atraumatic.      Nose: Nose normal.   Eyes:      General: No scleral icterus.     Extraocular Movements: Extraocular movements intact.   Neck:      Thyroid: No thyromegaly.      Vascular: No JVD.      Trachea: No tracheal deviation.   Cardiovascular:      Rate and Rhythm: Normal rate and regular rhythm.      Heart sounds: Murmur heard.      No friction rub. No gallop.   Pulmonary:      Effort: Pulmonary effort is normal. No respiratory distress.      Breath sounds: Normal breath sounds. No wheezing or rales.   Abdominal:      General: There is no distension.      Palpations: Abdomen is soft. There is no mass.      Tenderness: " There is no abdominal tenderness.   Musculoskeletal:         General: No tenderness. Normal range of motion.      Cervical back: Normal range of motion and neck supple.   Lymphadenopathy:      Cervical: No cervical adenopathy.   Skin:     General: Skin is warm and dry.      Findings: No rash.   Neurological:      Mental Status: He is alert and oriented to person, place, and time.      Cranial Nerves: No cranial nerve deficit.      Deep Tendon Reflexes: Reflexes normal.   Psychiatric:         Behavior: Behavior normal.         Lab Results   Component Value Date    WBC 3.77 (L) 05/06/2023    HGB 12.2 (L) 05/06/2023    HCT 38.8 (L) 05/06/2023     05/06/2023    CHOL 122 05/06/2023    TRIG 118 05/06/2023    HDL 41 05/06/2023    ALT 18 11/11/2023    AST 15 11/11/2023     11/11/2023    K 3.7 11/11/2023     11/11/2023    CREATININE 1.1 11/11/2023    BUN 9 11/11/2023    CO2 28 11/11/2023    TSH 1.224 05/06/2023    PSA 7.2 (H) 07/29/2016    HGBA1C 11.0 (H) 02/10/2024       Assessment:       1. Type 2 diabetes mellitus with hyperglycemia, with long-term current use of insulin    2. Prostate cancer    3. Hyperlipidemia associated with type 2 diabetes mellitus    4. Hypertension associated with diabetes    5. Screening for colon cancer    6. Chronic left shoulder pain          Plan:     Sanchez was seen today for follow-up.    Diagnoses and all orders for this visit:    Type 2 diabetes mellitus with hyperglycemia, with long-term current use of insulin  -     tirzepatide (MOUNJARO) 5 mg/0.5 mL PnIj; Inject 5 mg into the skin every 7 days.  -     Ambulatory referral/consult to Optometry; Future    Prostate cancer  Comments:  s/p robotic prostatectomy. followed previously by Dr. Chavez. needs to f/u. Last PSA stable, recheck.  Orders:  -     Ambulatory referral/consult to Urology; Future  -     PROSTATE SPECIFIC ANTIGEN, DIAGNOSTIC; Future    Hyperlipidemia associated with type 2 diabetes mellitus  -     Lipid  Panel; Future    Hypertension associated with diabetes  -     CBC Auto Differential; Future  -     Comprehensive Metabolic Panel; Future  -     TSH; Future    Screening for colon cancer  -     Ambulatory referral/consult to Endo Procedure ; Future    Chronic left shoulder pain  -     Ambulatory referral/consult to Physical/Occupational Therapy; Future    Medical noncompliance with treatment plan for type 2 diabetes.  Last hemoglobin A1c 11.0.  Patient had run out of Xigduo as well as glipizide and has not refilled this from his pharmacy.  Patient is not taking his Toujeo 20 units nightly due to needle phobia.  Patient does tolerate taking Mounjaro 2.5 mg weekly.    Plan to increase Mounjaro to 5 mg weekly.  We will remove Toujeo due to noncompliance.  Patient can not be convinced to take daily insulin.  Patient does not check his blood sugars.  I spoke with diabetes NP Wally Dorantes regarding this as well.    Patient is due for follow up with Urology.  We will change his PSA prior to next appointment.    Patient is due for colonoscopy.  Unsure why referrals from 2022 in 2023 were never scheduled.    Blood pressure uncontrolled instructed patient to monitor diet carefully.    Health maintenance reviewed with patient.    Follow up in about 3 months (around 7/2/2024).    Jamin Anthony MD  Internal Medicine / Primary Care  [facility]  4/2/2024

## 2024-04-03 DIAGNOSIS — C61 PROSTATE CANCER: Primary | ICD-10-CM

## 2024-04-05 ENCOUNTER — LAB VISIT (OUTPATIENT)
Dept: LAB | Facility: HOSPITAL | Age: 69
End: 2024-04-05
Attending: UROLOGY
Payer: MEDICARE

## 2024-04-05 DIAGNOSIS — C61 PROSTATE CANCER: ICD-10-CM

## 2024-04-05 LAB — COMPLEXED PSA SERPL-MCNC: 0.02 NG/ML (ref 0–4)

## 2024-04-05 PROCEDURE — 84153 ASSAY OF PSA TOTAL: CPT | Performed by: UROLOGY

## 2024-04-05 PROCEDURE — 36415 COLL VENOUS BLD VENIPUNCTURE: CPT | Mod: PO | Performed by: UROLOGY

## 2024-04-10 ENCOUNTER — CLINICAL SUPPORT (OUTPATIENT)
Dept: REHABILITATION | Facility: HOSPITAL | Age: 69
End: 2024-04-10
Payer: MEDICARE

## 2024-04-10 DIAGNOSIS — G89.29 CHRONIC LEFT SHOULDER PAIN: ICD-10-CM

## 2024-04-10 DIAGNOSIS — M25.512 CHRONIC LEFT SHOULDER PAIN: ICD-10-CM

## 2024-04-10 PROCEDURE — 97165 OT EVAL LOW COMPLEX 30 MIN: CPT

## 2024-04-10 PROCEDURE — 97110 THERAPEUTIC EXERCISES: CPT

## 2024-04-10 NOTE — PLAN OF CARE
[facility]  Occupational Therapy Initial Evaluation     Date: 4/10/2024  Patient: Sanchez Purcell  Chart Number: [id number]  Referring Physician: Jamin Anthony MD  Therapy Diagnosis: No diagnosis found.    Medical Diagnosis: M25.512,G89.29 (ICD-10-CM) - Chronic left shoulder pain   Physician Orders: Eval and treat  Evaluation Date: 4/10/2024  Plan of Care Certification Date: 2024 - 2025  Authorization Period: 7/10/24  Surgery Date and Procedure: NA  Date of Return to MD: TL    FOTO: 1/3 = 66%    Visit #: 1 of   Time In: 8: 20 AM  Time Out: 9:10 AM  Total Billable Time: 50 minutes    Precautions: Standard, Standard    Subjective     Involved Side: left shoulder  Dominant Side: right hand  Date of Onset: 3 motnhs prior to intiail eval  History of Current Condition: The left shoulder hurts more at night for approximately 3 months.   Imaging: none for this condition  Previous Therapy: no    Patient's Goals for Therapy: Improved function in daily activity with decreased pain.     Pain:  Functional Pain Scale Rating 0-10:   3/10 on average  1/10 at best  8/10 at worst    Sleepin/10    Location: around the shoulder from anterior to posterior  Description: aching pain  Aggravating Factors: turning during sleep  Easing Factors: rest and decreased motion      Occupation:  Project overseer  Working presently: part-time  Duties: walking, driving, typing, cell phone communication    Functional Limitations/Social History:    Previous functional status includes: Independent with all ADLs.     Current Functional Status   Home/Living environment: lives with their family      Limitation of Functional Status as follows:   ADLs/IADLs:     - Feeding: independent    - Bathing: independent    - Dressing/Grooming: independent    - Home Management: independent    - Driving: independent     Leisure: Exercise and being a grandparent    Past Medical History/Physical Systems Review:   Sanchez Purcell  has  a past medical history of Diabetes mellitus type II, Heart murmur, Hyperlipidemia, Hypertension, and Prostate cancer.    Sanchez Purcell  has a past surgical history that includes Colonoscopy and Prostate surgery.    Sanchez has a current medication list which includes the following prescription(s): atorvastatin, xigduo xr, fenofibrate, glipizide, losartan, pen needle, diabetic, and mounjaro.    Review of patient's allergies indicates:   Allergen Reactions    No known drug allergies           Objective     Mental status: alert, oriented x3    Observation:   Left shoulder protraction as compared to the right  Left shoulder elevation at the lateral upper trapezius  Left scapula sitting more medially at rest as compared to the right  Increased muscle mass and tightness in the bilateral upper trapezius    Sensation: No decrease in sensation, per patient report or skilled therapist observation.    Range of Motion:   Left  Shoulder ROM. Measured in degrees.   4/9/2024 4/9/2024      Left Right     Shoulder Flexion 170, T! 165     Shoulder Extension 40, T! 60     Shoulder Abduction 170 180     Shoulder ER @90 70 80     Shoulder IR @ 90 60, C!, T! 66     Horizontal ADDuction 12, p! 28     Comments:       ROM Comments: No painful arc experienced.     Painful Arc:   Patient demonstrates no painful arc in shoulder flexion or abduction      Strength  Shoulder Flexion RUE:  Deltoid 5/5   Shoulder Extension RUE: 5/5   Shoulder Abduction RUE:   Deltoid/Supraspinatus 5/5   Shoulder Adduction RUE:   Subscapularlis/Pec Minor NT   Internal Rotation RUE:  Subscapularis 5/5   External Rotation RUE:  @Side: Infraspinatus  @90: Teres Minor 5/5   Horizontal Adduction RUE: NT   Shoulder Flexion LUE: 5/5   Shoulder Extension LUE: 4/5   Shoulder Abduction LUE: 5/5   Shoulder Adduction LUE: NT   Internal Rotation LUE:  4/5   External Rotation LUE:  4/5, p!   Horizontal Adduction LUE:  NT   Comments: P! = pain; Weakness for ER, IR and extension  "for the LUE as compared to the RUE.       Subacromial Impingement Tests   Right   Left     Painful Arc  (Pain from ) NT -   Wynn-Johnnie  (Passively flex to 90* then IR) NT +   Neers  (Depress scapula, then IR and flex shoulder) NT NT   Yokum   (Hand on opposite shoulder, raise elbow to head) NT +        Rotator Cuff Tests   Right   Left     Drop Arm Test  (Supraspinatus) NT -   Empty Can/Jobes  (Supraspinatus) NT -   Patte Test/Horn Blower's  (Infraspinatus) NT +   Lift Off Sign  (Subscapularis) NT +   Driggs Test/Belly Test  (Subscapularis) NT NT   Infraspinatus ER NT +       Bicipital Tendonitis Tests   Right   Left     Speed's  (Shoulder flex, palm up) NT NT   Yergason's  (Most reliable) NT -   Halley NT NT    Apley Test: IR = L2-3    Palpation: (for pain)     Positive: AC joint and Supraspinatus Region      Limitations of Functional Status:   Self Care: independent with no increased difficulty. Some increased pain during driving and sleep.     Intake Outcome Measure for FOTO Hand/Wrist/Finger Survey    Therapist reviewed FOTO scores for patient this session.  FOTO documents entered into [employer] - see Media section.    Intake Score: 66%       Treatment     Total Treatment time separate from Evaluation time:10 minutes    Sanchez performed therapeutic exercises for 10 minutes including:  - Green theraband: rows; lat pulls; IR; ER walkouts x 10 each  - Wall snow angels x 15  - Serratus punches x  5  - Pec Stretch 3 x 30"  - Biceps stretch: 3 x 30"    Home Exercise Program/Education:  Issued HEP (see patient instructions in EMR) and educated on modality use for pain management . Exercises were reviewed and Sanchez was able to demonstrate them prior to the end of the session.   Pt received a written copy of exercises to perform at home. Sanchez demonstrated good  understanding of the education provided.  Pt was advised to perform these exercises free of pain, and to stop performing them if pain " occurs.    Patient/Family Education: role of OT, goals for OT, scheduling/cancellations - pt verbalized understanding. Discussed insurance limitations with patient.    Additional Education provided: Anatomy and biomechanics of the shoulder girdle      Assessment     Sanchez Purcell is a 68 y.o. male presents with limitations as described in problem list. He presents with decreased motion and increased weakness during shoulder external rotation (ER) and internal rotation (IR). He further has increased tightness in the upper trapezius and bicipital groove. He tested positive for subacromial impingement with some infraspinatus dysfunction and tightness in the subscapularis as well as the supraspinatus. Increased pain during resisted external rotation, internal rotation at 90 degrees of abduction and during special testing for the subscapularis and infraspinatus. Pain during impingement testing and horizontal adduction. These results are consistent with other therapist findings during MMT and active range of motion measurements. Therapy to focus on stretching the subscapularis, pectorals, brachialis, biceps brachii, periscapular muscles and upper trapezius with strengthening for periscapular muscles, shoulder extensors, shoulder stabilizers and external rotators.     Patient can benefit from Occupational Therapy services for Iontophoresis, ultrasound, moist heat, therapeutic exercises, home exercise program provied with written instructions, ice and strengthening and orthotics, if deemed necessary . The following goals were discussed with the patient and she is in agreement with them as to be addressed in the treatment plan.    The patient's rehab potential is Good.     Anticipated barriers to occupational therapy: chronic condition occurring for over 3 months  Pt has no cultural, educational or language barriers to learning provided.    Medical Necessity is demonstrated by the following:  Occupational  Profile/History  Co-morbidities and personal factors that may impact the plan of care [x] LOW: Brief chart review  [] MODERATE: Expanded chart review   [] HIGH: Extensive chart review    Moderate / High Support Documentation     Examination  Performance deficits relating to physical, cognitive or psychosocial skills that result in activity limitations and/or participation restrictions  [x] LOW: addressing 1-3 Performance deficits  [] MODERATE: 3-5 Performance deficits  [] HIGH: 5+ Performance deficits (please support below)    Moderate / High Support Documentation:    Physical:  Joint Mobility  Muscle Power/Strength  Pain    Cognitive:  No Deficits    Psychosocial:    Habits  Routines  Rituals     Treatment Options [x] LOW: Limited options  [] MODERATE: Several options  [] HIGH: Multiple options      Decision Making/ Complexity Score: low       Goals:    LTG's (6 weeks):  1)   Increase ROM 20 degrees in SE to increase functional hand use for ADLs/work/leisure activities. progressing not met 4/10/2024  2)   The pt to report at most one wake up episode at night with pain levels at worst a 2/10 by D/C. progressing not met 4/10/2024  3)   Increase ROM 10 degrees in ER to increase functional hand use for ADLs/work/leisure activities. progressing not met 4/10/2024  4)   Increase ROM to spinal level T10 in IR behind back to increase functional hand use for ADLs/work/leisure activities. progressing not met 4/10/2024  5)   Decrease complaints of pain to  1 out of 10 at worst to increase functional hand use for ADL/work/leisure activities. progressing not met 4/10/2024  6)   Patient to score at 75% or more on FOTO to demonstrate improved perception of functional shoulder use. progressing not met 4/10/2024   7)   Pt will return to near to prior level of function for ADLs and household management reporting I or Mod I with ADLs (dressing, feeding, grooming, toileting). progressing not met 4/10/2024    STG's (3 weeks)  1)    Patient to be IND with HEP and modalities for pain/edema managment. progressing not met 4/10/2024  2)   Increase ROM 10 degrees in SE to increase functional hand use for ADLs/work/leisure activities. progressing not met 4/10/2024  3)   The pt to report at most two wake up episode at night with pain levels at worst a 4/10 in 3 weeks. progressing not met 4/10/2024  4)   Increase ROM 5 degrees in ER to increase functional hand use for ADLs/work/leisure activities. progressing not met 4/10/2024  5)   Increase ROM to spinal level T12 in IR behind back to increase functional hand use for ADLs/work/leisure activities. progressing not met 4/10/2024  6)   Patient to score at 70% or more on FOTO to demonstrate improved perception of functional shoulder use. progressing not met 4/10/2024  7)   Decrease complaints of pain to  3 out of 10 at worst to increase functional hand use for ADL/work/leisure activities. progressing not met 4/10/2024     Plan     Pt to be treated by Occupational Therapy 1 times per week for 6 weeks during the certification period from 4/10/2024 to 7/10/2024 to achieve the established goals.     Treatment to include: Paraffin, Fluidotherapy, Manual therapy/joint mobilizations, Modalities for pain management, Therapeutic exercises/activities., Iontophoresis with 2.0 cc Dexamethasone, Strengthening, and Joint Protection, as well as any other treatments deemed necessary based on the patient's needs or progress.     Anoop Guevara, OT

## 2024-04-17 ENCOUNTER — CLINICAL SUPPORT (OUTPATIENT)
Dept: REHABILITATION | Facility: HOSPITAL | Age: 69
End: 2024-04-17
Payer: MEDICARE

## 2024-04-17 DIAGNOSIS — M25.512 CHRONIC LEFT SHOULDER PAIN: Primary | ICD-10-CM

## 2024-04-17 DIAGNOSIS — G89.29 CHRONIC LEFT SHOULDER PAIN: Primary | ICD-10-CM

## 2024-04-17 PROCEDURE — 97110 THERAPEUTIC EXERCISES: CPT

## 2024-04-17 PROCEDURE — 97140 MANUAL THERAPY 1/> REGIONS: CPT

## 2024-04-17 NOTE — PROGRESS NOTES
"[facility]  Occupational Therapy Treatment Note     Date: 4/17/2024  Name: Sanchez Purcell  Clinic Number: [id number]    Therapy Diagnosis:   Encounter Diagnosis   Name Primary?    Chronic left shoulder pain Yes     Physician: Jamin Anthony MD    Medical Diagnosis: M25.512,G89.29 (ICD-10-CM) - Chronic left shoulder pain   Physician Orders: Eval and treat  Evaluation Date: 4/10/2024  Plan of Care Certification Date: 4/2/2024 - 4/2/2025  Authorization Period: 7/10/24  Surgery Date and Procedure: NA  Date of Return to MD: TL     FOTO: 1/3 = 66%     Visit #: 1 of 1  Time In: 8: 00 AM  Time Out: 9:00 AM  Total Billable Time: 60 minutes     Precautions: Standard, Standard    Subjective     Patient reports: He woke up some last night and was able to be able to complete his exercises some but not as much as he was hoping.   He was compliant with home exercise program given last session.   Response to previous treatment:duc well  Functional change: no functional change at this time    Pain: 5/10  Location: left shoulder  - woke him up a bit last night.     Objective     Shoulder ROM. Measured in degrees.    4/9/2024 4/9/2024         Left Right       Shoulder Flexion 170, T! 165       Shoulder Extension 40, T! 60       Shoulder Abduction 170 180       Shoulder ER @90 70 80       Shoulder IR @ 90 60, C!, T! 66       Horizontal ADDuction 12, p! 28         Treatment     Sanchez received the treatments listed below:      therapeutic exercises to develop strength, ROM, and posture for 45 minutes including:  HEP Review:   - Green Theraband: Rows; IR; ER; Lat pulls: 3 x 10 each   - biceps stretch: 3 x 30"   - Pec doorway stretch: 3 x 30"   - SA punches: 3 x 10 with 3#  dumb bells  - upper trap stretch: 3 x 30"  - levator scap stretch: 3 x 30"    manual therapy techniques: Joint mobilizations and Soft tissue Mobilization were applied to the: left shoulder for 15 minutes, including:  - Shoulder mobilization " ant/post/inferior  - STM to left biceps tendon  - Scapular mobilizations    neuromuscular re-education activities to improve: Coordination and Posture for 0 minutes. The following activities were included:  NT    therapeutic activities to improve functional performance for 0  minutes, including:  NT      Patient Education and Home Exercises     Education provided:   - Shoulder posture during exercises.  - Progress towards goals     Written Home Exercises Provided: Patient instructed to cont prior HEP.  Exercises were reviewed and Sanchez was able to demonstrate them prior to the end of the session.  Sanchez demonstrated good  understanding of the home exercise program provided. See electronic medical record under Patient Instructions for exercises provided during therapy sessions.       Assessment     Good passive scapular movement this date. Stiff in the anterior shoulder during joint mobilizations with noted tightness in the short heads of the biceps tendon. Good performance of all resistive exercises and stretches this date with some cues required for improved posture. Therapy to continue focus on posture, stretching periscapular muscles and rotator cuff effective use during shoulder mobility.     Sanchez is progressing well towards his goals and there are no updates to goals at this time. Pt prognosis is Good.     Patient will continue to benefit from skilled outpatient occupational therapy to address the deficits listed in the problem list on initial evaluation provide patient/family education and to maximize patient's level of independence in the home and community environment.     Patient's spiritual, cultural and educational needs considered and patient agreeable to plan of care and goals.    Anticipated barriers to occupational therapy:  chronic condition occurring for over 3 months     Goals:   LTG's (6 weeks):  1)   Increase ROM 20 degrees in SE to increase functional hand use for ADLs/work/leisure  activities. progressing not met 4/10/2024  2)   The pt to report at most one wake up episode at night with pain levels at worst a 2/10 by D/C. progressing not met 4/10/2024  3)   Increase ROM 10 degrees in ER to increase functional hand use for ADLs/work/leisure activities. progressing not met 4/10/2024  4)   Increase ROM to spinal level T10 in IR behind back to increase functional hand use for ADLs/work/leisure activities. progressing not met 4/10/2024  5)   Decrease complaints of pain to  1 out of 10 at worst to increase functional hand use for ADL/work/leisure activities. progressing not met 4/10/2024  6)   Patient to score at 75% or more on FOTO to demonstrate improved perception of functional shoulder use. progressing not met 4/10/2024   7)   Pt will return to near to prior level of function for ADLs and household management reporting I or Mod I with ADLs (dressing, feeding, grooming, toileting). progressing not met 4/10/2024     STG's (3 weeks)  1)   Patient to be IND with HEP and modalities for pain/edema managment. progressing not met 4/10/2024  2)   Increase ROM 10 degrees in SE to increase functional hand use for ADLs/work/leisure activities. progressing not met 4/10/2024  3)   The pt to report at most two wake up episode at night with pain levels at worst a 4/10 in 3 weeks. progressing not met 4/10/2024  4)   Increase ROM 5 degrees in ER to increase functional hand use for ADLs/work/leisure activities. progressing not met 4/10/2024  5)   Increase ROM to spinal level T12 in IR behind back to increase functional hand use for ADLs/work/leisure activities. progressing not met 4/10/2024  6)   Patient to score at 70% or more on FOTO to demonstrate improved perception of functional shoulder use. progressing not met 4/10/2024  7)   Decrease complaints of pain to  3 out of 10 at worst to increase functional hand use for ADL/work/leisure activities. progressing not met 4/10/2024     Plan     Updates/Grading for  next session:                  Continue OT MAKENNA Guevara OT   4/17/2024

## 2024-04-17 NOTE — PROGRESS NOTES
Clinic Note  4/17/2024      Subjective:         Chief Complaint:   SAMI Purcell is a 68 y.o. malee s/p RALP 02/10/2017  Rylee 7 (3+4); (-) extension, SV, but (+) apex margin; pT2c, pN0, pMx. Perfect continence.  Positive family history.  Trained with PEP but hasn't used it. Worried it will cause cancer to come back. Cialis doesn't help.  Has worked for [employer] for 41 years. Now works as a contractor.  Most recent visit here was on 9/28/2021.  PSA 0.02 on 04/05/2024. Continues to void well. Erections are good.      Lab Results   Component Value Date    PSA 7.2 (H) 07/29/2016    PSA 5.5 (H) 04/29/2015    PSA 2.8 09/26/2011    PSA 2.0 05/24/2010    PSADIAG 0.02 04/05/2024    PSADIAG 0.02 05/06/2023    PSADIAG 0.05 09/25/2021    PSADIAG 0.03 02/26/2021    PSADIAG 0.06 01/28/2020    PSADIAG 0.06 07/26/2019    PSADIAG 0.06 01/10/2019    PSADIAG 0.04 07/13/2018    PSADIAG 0.02 01/13/2018    PSADIAG <0.01 07/17/2017      Past Medical History:   Diagnosis Date    Diabetes mellitus type II     Heart murmur 1990's    Hyperlipidemia     Hypertension     Prostate cancer      Family History   Problem Relation Name Age of Onset    Diabetes Mother      Cataracts Mother      Cancer Sister Jocelyn 53        breast    Cancer Brother  65        colon    Cancer Sister Nilsa 32        abd cancer    Cataracts Father      No Known Problems Maternal Aunt      No Known Problems Maternal Uncle      No Known Problems Paternal Aunt      No Known Problems Paternal Uncle      No Known Problems Maternal Grandmother      No Known Problems Maternal Grandfather      No Known Problems Paternal Grandmother      No Known Problems Paternal Grandfather      Amblyopia Neg Hx      Blindness Neg Hx      Glaucoma Neg Hx      Hypertension Neg Hx      Macular degeneration Neg Hx      Retinal detachment Neg Hx      Strabismus Neg Hx      Stroke Neg Hx      Thyroid disease Neg Hx       Social History     Socioeconomic History    Marital status:   "  Occupational History    Occupation: retired    Tobacco Use    Smoking status: Never    Smokeless tobacco: Never   Substance and Sexual Activity    Alcohol use: Yes     Comment: social    Drug use: No    Sexual activity: Yes     Partners: Female   Social History Narrative    Retired  but works as a contractor for [employer].     Past Surgical History:   Procedure Laterality Date    COLONOSCOPY      5yrs ago    PROSTATE SURGERY       Patient Active Problem List   Diagnosis    Hypertension associated with diabetes    Type 2 diabetes mellitus without complication, with long-term current use of insulin    Family history of prostate cancer    Prostate cancer    Refractive error    Posterior vitreous detachment of both eyes    Family history of colon cancer    Hyperlipidemia associated with type 2 diabetes mellitus    Hepatitis C antibody test positive    Normocytic anemia    Excessive daytime sleepiness    Lab test positive for detection of COVID-19 virus    Chronic left shoulder pain     Review of Systems   All other systems reviewed and are negative.        Objective:      There were no vitals taken for this visit.  Estimated body mass index is 33.02 kg/m² as calculated from the following:    Height as of 4/2/24: 5' 8" (1.727 m).    Weight as of 4/2/24: 98.5 kg (217 lb 2.5 oz).  Physical Exam  Vitals and nursing note reviewed.   Constitutional:       General: He is not in acute distress.     Appearance: He is well-developed.   Cardiovascular:      Rate and Rhythm: Normal rate.   Pulmonary:      Effort: Pulmonary effort is normal. No respiratory distress.   Abdominal:      General: There is no distension.      Palpations: Abdomen is soft.      Tenderness: There is no abdominal tenderness.      Comments: No CVA tenderness   Musculoskeletal:         General: No tenderness. Normal range of motion.   Skin:     General: Skin is warm and dry.      Findings: No rash.   Neurological:      Mental Status: He is alert " and oriented to person, place, and time.   Psychiatric:         Judgment: Judgment normal.           Assessment and Plan:           Problem List Items Addressed This Visit       Family history of prostate cancer - Primary    Prostate cancer       Follow up:   PSA 1 year with IRLANDA    Champ Chavez

## 2024-04-18 ENCOUNTER — OFFICE VISIT (OUTPATIENT)
Dept: UROLOGY | Facility: CLINIC | Age: 69
End: 2024-04-18
Payer: MEDICARE

## 2024-04-18 VITALS
DIASTOLIC BLOOD PRESSURE: 88 MMHG | SYSTOLIC BLOOD PRESSURE: 173 MMHG | BODY MASS INDEX: 32.42 KG/M2 | HEART RATE: 71 BPM | WEIGHT: 213.94 LBS | HEIGHT: 68 IN

## 2024-04-18 DIAGNOSIS — C61 PROSTATE CANCER: ICD-10-CM

## 2024-04-18 DIAGNOSIS — Z80.42 FAMILY HISTORY OF PROSTATE CANCER: ICD-10-CM

## 2024-04-18 DIAGNOSIS — C61 PROSTATE CANCER: Primary | ICD-10-CM

## 2024-04-18 PROCEDURE — 1159F MED LIST DOCD IN RCRD: CPT | Mod: CPTII,S$GLB,, | Performed by: UROLOGY

## 2024-04-18 PROCEDURE — 3079F DIAST BP 80-89 MM HG: CPT | Mod: CPTII,S$GLB,, | Performed by: UROLOGY

## 2024-04-18 PROCEDURE — 1101F PT FALLS ASSESS-DOCD LE1/YR: CPT | Mod: CPTII,S$GLB,, | Performed by: UROLOGY

## 2024-04-18 PROCEDURE — 3288F FALL RISK ASSESSMENT DOCD: CPT | Mod: CPTII,S$GLB,, | Performed by: UROLOGY

## 2024-04-18 PROCEDURE — 4010F ACE/ARB THERAPY RXD/TAKEN: CPT | Mod: CPTII,S$GLB,, | Performed by: UROLOGY

## 2024-04-18 PROCEDURE — 3046F HEMOGLOBIN A1C LEVEL >9.0%: CPT | Mod: CPTII,S$GLB,, | Performed by: UROLOGY

## 2024-04-18 PROCEDURE — 3077F SYST BP >= 140 MM HG: CPT | Mod: CPTII,S$GLB,, | Performed by: UROLOGY

## 2024-04-18 PROCEDURE — 3008F BODY MASS INDEX DOCD: CPT | Mod: CPTII,S$GLB,, | Performed by: UROLOGY

## 2024-04-18 PROCEDURE — 99999 PR PBB SHADOW E&M-EST. PATIENT-LVL III: CPT | Mod: PBBFAC,,, | Performed by: UROLOGY

## 2024-04-18 PROCEDURE — 99214 OFFICE O/P EST MOD 30 MIN: CPT | Mod: S$GLB,,, | Performed by: UROLOGY

## 2024-04-24 ENCOUNTER — DOCUMENTATION ONLY (OUTPATIENT)
Dept: REHABILITATION | Facility: HOSPITAL | Age: 69
End: 2024-04-24
Payer: MEDICARE

## 2024-04-24 NOTE — PROGRESS NOTES
Patient did not show for today's appointment and did not communicate with the clinic regarding cancellation; appointment was for outpatient occupational therapy services. This sis the first appointment this patient has missed without prior communication. No charges for this date.      - Anoop Guevara, OTR/L

## 2024-05-01 ENCOUNTER — CLINICAL SUPPORT (OUTPATIENT)
Dept: REHABILITATION | Facility: HOSPITAL | Age: 69
End: 2024-05-01
Payer: MEDICARE

## 2024-05-01 DIAGNOSIS — M25.512 CHRONIC LEFT SHOULDER PAIN: Primary | ICD-10-CM

## 2024-05-01 DIAGNOSIS — G89.29 CHRONIC LEFT SHOULDER PAIN: Primary | ICD-10-CM

## 2024-05-01 PROCEDURE — 97110 THERAPEUTIC EXERCISES: CPT

## 2024-05-01 PROCEDURE — 97140 MANUAL THERAPY 1/> REGIONS: CPT

## 2024-05-01 NOTE — PROGRESS NOTES
"[facility]  Occupational Therapy Treatment Note     Date: 5/1/2024  Name: Sanchez Purcell  Clinic Number: [id number]    Therapy Diagnosis:   Encounter Diagnosis   Name Primary?    Chronic left shoulder pain Yes     Physician: Jamin Anthony MD    Medical Diagnosis: M25.512,G89.29 (ICD-10-CM) - Chronic left shoulder pain   Physician Orders: Eval and treat  Evaluation Date: 4/10/2024  Plan of Care Certification Date: 4/2/2024 - 4/2/2025  Authorization Period: 7/10/24  Surgery Date and Procedure: NA  Date of Return to MD: TL     FOTO: 1/3 = 66%     Visit #: 2 of 20  Time In: 8: 00 AM  Time Out: 9:00 AM  Total Billable Time: 60 minutes     Precautions: Standard, Standard    Subjective     Patient reports: He still has shoulder pain at night. He has used his theraband twice since receiving it approximately two weeks ago.   He was compliant with home exercise program given last session.   Response to previous treatment:duc well  Functional change: no functional change at this time    Pain: 2/10            8/10 - at night sleeping on his left shoulder  Location: left shoulder    Objective     Shoulder ROM. Measured in degrees.    4/9/2024 4/9/2024         Left Right       Shoulder Flexion 170, T! 165       Shoulder Extension 40, T! 60       Shoulder Abduction 170 180       Shoulder ER @90 70 80       Shoulder IR @ 90 60, C!, T! 66       Horizontal ADDuction 12, p! 28         Treatment     Sanchez received the treatments listed below:      therapeutic exercises to develop strength, ROM, and posture for 45 minutes including:   - Green Theraband: Row; ER; Lat pulls, Hitch hikers: 3 x 10 each (No IR this date)  - Shoulder extension with IR 3# dowel: 2 x 10   - biceps stretch: 3 x 30"   - Pec doorway stretch: 3 x 30"   - SA punches: 3 x 10 with 3#  dumb bells  - upper trap stretch: 3 x 30"  - levator scap stretch: 3 x 30"  - Shoulder ER with 2# in sidelying: 3 x 10  - LLPS for pectorals with MH on left " shoulder x 10'    manual therapy techniques: Joint mobilizations and Soft tissue Mobilization were applied to the: left shoulder for 15 minutes, including:  - Shoulder mobilization ant/post/inferior  - STM to left biceps tendon  - Scapular mobilizations    neuromuscular re-education activities to improve: Coordination and Posture for 0 minutes. The following activities were included:  NT    therapeutic activities to improve functional performance for 0  minutes, including:  NT      Patient Education and Home Exercises     Education provided:   - Shoulder posture during exercises.  - Progress towards goals     Written Home Exercises Provided: Patient instructed to cont prior HEP.  Exercises were reviewed and Sanchez was able to demonstrate them prior to the end of the session.  Sanchez demonstrated good  understanding of the home exercise program provided. See electronic medical record under Patient Instructions for exercises provided during therapy sessions.       Improved scapular motion this date. Increased performance of resistive exercises with no exacerbation of pain or breaks required. Continues tightness in the biceps tendon this date but not as prominent. Therapy to continue focus on posture, stretching periscapular muscles, stretching pectoral as well as biceps muscles and rotator cuff effective use during shoulder mobility.     Sanchez is progressing well towards his goals and there are no updates to goals at this time. Pt prognosis is Good.     Patient will continue to benefit from skilled outpatient occupational therapy to address the deficits listed in the problem list on initial evaluation provide patient/family education and to maximize patient's level of independence in the home and community environment.     Patient's spiritual, cultural and educational needs considered and patient agreeable to plan of care and goals.    Anticipated barriers to occupational therapy:  chronic condition occurring for  over 3 months     Goals:   LTG's (6 weeks):  1)   Increase ROM 20 degrees in SE to increase functional hand use for ADLs/work/leisure activities. progressing not met 4/10/2024  2)   The pt to report at most one wake up episode at night with pain levels at worst a 2/10 by D/C. progressing not met 4/10/2024  3)   Increase ROM 10 degrees in ER to increase functional hand use for ADLs/work/leisure activities. progressing not met 4/10/2024  4)   Increase ROM to spinal level T10 in IR behind back to increase functional hand use for ADLs/work/leisure activities. progressing not met 4/10/2024  5)   Decrease complaints of pain to  1 out of 10 at worst to increase functional hand use for ADL/work/leisure activities. progressing not met 4/10/2024  6)   Patient to score at 75% or more on FOTO to demonstrate improved perception of functional shoulder use. progressing not met 4/10/2024   7)   Pt will return to near to prior level of function for ADLs and household management reporting I or Mod I with ADLs (dressing, feeding, grooming, toileting). progressing not met 4/10/2024     STG's (3 weeks)  1)   Patient to be IND with HEP and modalities for pain/edema managment. progressing not met 4/10/2024  2)   Increase ROM 10 degrees in SE to increase functional hand use for ADLs/work/leisure activities. progressing not met 4/10/2024  3)   The pt to report at most two wake up episode at night with pain levels at worst a 4/10 in 3 weeks. progressing not met 4/10/2024  4)   Increase ROM 5 degrees in ER to increase functional hand use for ADLs/work/leisure activities. progressing not met 4/10/2024  5)   Increase ROM to spinal level T12 in IR behind back to increase functional hand use for ADLs/work/leisure activities. progressing not met 4/10/2024  6)   Patient to score at 70% or more on FOTO to demonstrate improved perception of functional shoulder use. progressing not met 4/10/2024  7)   Decrease complaints of pain to  3 out of 10 at  worst to increase functional hand use for ADL/work/leisure activities. progressing not met 4/10/2024     Plan     Updates/Grading for next session:                  Continue OT MAKENNA Guevara OT   5/1/2024

## 2024-05-03 ENCOUNTER — TELEPHONE (OUTPATIENT)
Dept: ENDOSCOPY | Facility: HOSPITAL | Age: 69
End: 2024-05-03

## 2024-05-03 ENCOUNTER — PATIENT MESSAGE (OUTPATIENT)
Dept: ENDOSCOPY | Facility: HOSPITAL | Age: 69
End: 2024-05-03

## 2024-05-03 NOTE — TELEPHONE ENCOUNTER
Attempted to contact patient to schedule colonoscopy. The patient did not answer the call.  Left voice message  requesting a call back at [phone] to get procedure scheduled.

## 2024-05-16 ENCOUNTER — CLINICAL SUPPORT (OUTPATIENT)
Dept: REHABILITATION | Facility: HOSPITAL | Age: 69
End: 2024-05-16
Payer: MEDICARE

## 2024-05-16 DIAGNOSIS — G89.29 CHRONIC LEFT SHOULDER PAIN: Primary | ICD-10-CM

## 2024-05-16 DIAGNOSIS — M25.512 CHRONIC LEFT SHOULDER PAIN: Primary | ICD-10-CM

## 2024-05-16 PROCEDURE — 97110 THERAPEUTIC EXERCISES: CPT | Mod: CO

## 2024-05-16 NOTE — PROGRESS NOTES
"[facility]  Occupational Therapy Treatment Note     Date: 5/16/2024  Name: Sanchez Purcell  Clinic Number: [id number]    Therapy Diagnosis:   Encounter Diagnosis   Name Primary?    Chronic left shoulder pain Yes       Physician: Jmain Anthony MD    Medical Diagnosis: M25.512,G89.29 (ICD-10-CM) - Chronic left shoulder pain   Physician Orders: Eval and treat  Evaluation Date: 4/10/2024  Plan of Care Certification Date: 4/2/2024 - 4/2/2025  Authorization Period: 7/10/24  Surgery Date and Procedure: NA  Date of Return to MD: TL     FOTO: 1/3 = 66%     Visit #: 3 of 20  Time In: 8:05 AM  Time Out: 8:58 AM  Total Billable Time: 45 minutes     Precautions: Standard, Standard    Subjective     Patient reports: no new updates   He was compliant with home exercise program given last session.   Response to previous treatment:duc well  Functional change: no functional change at this time    Pain: 2/10            8/10 - at night sleeping on his left shoulder  Location: left shoulder    Objective     Shoulder ROM. Measured in degrees.    4/9/2024 4/9/2024         Left Right       Shoulder Flexion 170, T! 165       Shoulder Extension 40, T! 60       Shoulder Abduction 170 180       Shoulder ER @90 70 80       Shoulder IR @ 90 60, C!, T! 66       Horizontal ADDuction 12, p! 28         Treatment     Sanchez received the treatments listed below:      Heat pack x 8 min     therapeutic exercises to develop strength, ROM, and posture for 45 minutes including:   - Green Theraband: Row; ER; Lat pulls, (Hitch hikers- 1# DB today): 3 x 10 each (No IR this date)  - Shoulder extension with IR 3# dowel: 2 x 10 (NT)   - biceps stretch: 5 sec x 10 reps    - Pec doorway stretch: 5 sec x 10 reps    - SA punches: 3 x 10 with 3#  dumb bells  - upper trap stretch: 5 sec x 10 reps   - levator scap stretch: 3 x 30" (NT)  - Shoulder ER with 2# in sidelying: 3 x 10 (NT)  - LLPS for pectorals with MH on left shoulder x 10' (NT)   - " supine stability alphabet with 1#   - IR towel stretch 5 sec holds x 10     manual therapy techniques: Joint mobilizations and Soft tissue Mobilization were applied to the: left shoulder for 0 minutes, including:  - Shoulder mobilization ant/post/inferior  - STM to left biceps tendon  - Scapular mobilizations    neuromuscular re-education activities to improve: Coordination and Posture for 0 minutes. The following activities were included:  NT    therapeutic activities to improve functional performance for 0  minutes, including:  NT      Patient Education and Home Exercises     Education provided:   - Shoulder posture during exercises.  - Progress towards goals     Written Home Exercises Provided: Patient instructed to cont prior HEP.  Exercises were reviewed and Sanchez was able to demonstrate them prior to the end of the session.  Sanchez demonstrated good  understanding of the home exercise program provided. See electronic medical record under Patient Instructions for exercises provided during therapy sessions.       Pt tolerated session well today. Pain in anterior shoulder cont to improve. Therapy to continue focus on posture, stretching periscapular muscles, stretching pectoral as well as biceps muscles and rotator cuff effective use during shoulder mobility.   Client Care conference completed with evaluating therapist in regards to this patients POC as evidenced by co signature of supervising therapist.       Sanchez is progressing well towards his goals and there are no updates to goals at this time. Pt prognosis is Good.     Patient will continue to benefit from skilled outpatient occupational therapy to address the deficits listed in the problem list on initial evaluation provide patient/family education and to maximize patient's level of independence in the home and community environment.     Patient's spiritual, cultural and educational needs considered and patient agreeable to plan of care and  goals.    Anticipated barriers to occupational therapy:  chronic condition occurring for over 3 months     Goals:   LTG's (6 weeks):  1)   Increase ROM 20 degrees in SE to increase functional hand use for ADLs/work/leisure activities. progressing not met 4/10/2024  2)   The pt to report at most one wake up episode at night with pain levels at worst a 2/10 by D/C. progressing not met 4/10/2024  3)   Increase ROM 10 degrees in ER to increase functional hand use for ADLs/work/leisure activities. progressing not met 4/10/2024  4)   Increase ROM to spinal level T10 in IR behind back to increase functional hand use for ADLs/work/leisure activities. progressing not met 4/10/2024  5)   Decrease complaints of pain to  1 out of 10 at worst to increase functional hand use for ADL/work/leisure activities. progressing not met 4/10/2024  6)   Patient to score at 75% or more on FOTO to demonstrate improved perception of functional shoulder use. progressing not met 4/10/2024   7)   Pt will return to near to prior level of function for ADLs and household management reporting I or Mod I with ADLs (dressing, feeding, grooming, toileting). progressing not met 4/10/2024     STG's (3 weeks)  1)   Patient to be IND with HEP and modalities for pain/edema managment. progressing not met 4/10/2024  2)   Increase ROM 10 degrees in SE to increase functional hand use for ADLs/work/leisure activities. progressing not met 4/10/2024  3)   The pt to report at most two wake up episode at night with pain levels at worst a 4/10 in 3 weeks. progressing not met 4/10/2024  4)   Increase ROM 5 degrees in ER to increase functional hand use for ADLs/work/leisure activities. progressing not met 4/10/2024  5)   Increase ROM to spinal level T12 in IR behind back to increase functional hand use for ADLs/work/leisure activities. progressing not met 4/10/2024  6)   Patient to score at 70% or more on FOTO to demonstrate improved perception of functional shoulder  use. progressing not met 4/10/2024  7)   Decrease complaints of pain to  3 out of 10 at worst to increase functional hand use for ADL/work/leisure activities. progressing not met 4/10/2024     Plan     Updates/Grading for next session:                  Continue OT POC    SUZY Chamberlain   5/16/2024

## 2024-05-25 ENCOUNTER — LAB VISIT (OUTPATIENT)
Dept: LAB | Facility: HOSPITAL | Age: 69
End: 2024-05-25
Payer: MEDICARE

## 2024-05-25 DIAGNOSIS — E11.59 HYPERTENSION ASSOCIATED WITH DIABETES: ICD-10-CM

## 2024-05-25 DIAGNOSIS — E11.9 TYPE 2 DIABETES MELLITUS WITHOUT COMPLICATION, WITH LONG-TERM CURRENT USE OF INSULIN: ICD-10-CM

## 2024-05-25 DIAGNOSIS — E78.5 HYPERLIPIDEMIA ASSOCIATED WITH TYPE 2 DIABETES MELLITUS: ICD-10-CM

## 2024-05-25 DIAGNOSIS — Z79.4 TYPE 2 DIABETES MELLITUS WITHOUT COMPLICATION, WITH LONG-TERM CURRENT USE OF INSULIN: ICD-10-CM

## 2024-05-25 DIAGNOSIS — C61 PROSTATE CANCER: ICD-10-CM

## 2024-05-25 DIAGNOSIS — I15.2 HYPERTENSION ASSOCIATED WITH DIABETES: ICD-10-CM

## 2024-05-25 DIAGNOSIS — E11.69 HYPERLIPIDEMIA ASSOCIATED WITH TYPE 2 DIABETES MELLITUS: ICD-10-CM

## 2024-05-25 LAB
ALBUMIN SERPL BCP-MCNC: 3.8 G/DL (ref 3.5–5.2)
ALP SERPL-CCNC: 120 U/L (ref 55–135)
ALT SERPL W/O P-5'-P-CCNC: 40 U/L (ref 10–44)
ANION GAP SERPL CALC-SCNC: 7 MMOL/L (ref 8–16)
AST SERPL-CCNC: 27 U/L (ref 10–40)
BASOPHILS # BLD AUTO: 0.02 K/UL (ref 0–0.2)
BASOPHILS NFR BLD: 0.6 % (ref 0–1.9)
BILIRUB SERPL-MCNC: 0.5 MG/DL (ref 0.1–1)
BUN SERPL-MCNC: 14 MG/DL (ref 8–23)
CALCIUM SERPL-MCNC: 9.8 MG/DL (ref 8.7–10.5)
CHLORIDE SERPL-SCNC: 100 MMOL/L (ref 95–110)
CHOLEST SERPL-MCNC: 127 MG/DL (ref 120–199)
CHOLEST/HDLC SERPL: 3.3 {RATIO} (ref 2–5)
CO2 SERPL-SCNC: 27 MMOL/L (ref 23–29)
COMPLEXED PSA SERPL-MCNC: 0.02 NG/ML (ref 0–4)
CREAT SERPL-MCNC: 1.2 MG/DL (ref 0.5–1.4)
DIFFERENTIAL METHOD BLD: ABNORMAL
EOSINOPHIL # BLD AUTO: 0.1 K/UL (ref 0–0.5)
EOSINOPHIL NFR BLD: 2.2 % (ref 0–8)
ERYTHROCYTE [DISTWIDTH] IN BLOOD BY AUTOMATED COUNT: 13.3 % (ref 11.5–14.5)
EST. GFR  (NO RACE VARIABLE): >60 ML/MIN/1.73 M^2
ESTIMATED AVG GLUCOSE: 226 MG/DL (ref 68–131)
GLUCOSE SERPL-MCNC: 278 MG/DL (ref 70–110)
HBA1C MFR BLD: 9.5 % (ref 4–5.6)
HCT VFR BLD AUTO: 39.3 % (ref 40–54)
HDLC SERPL-MCNC: 39 MG/DL (ref 40–75)
HDLC SERPL: 30.7 % (ref 20–50)
HGB BLD-MCNC: 12.5 G/DL (ref 14–18)
IMM GRANULOCYTES # BLD AUTO: 0 K/UL (ref 0–0.04)
IMM GRANULOCYTES NFR BLD AUTO: 0 % (ref 0–0.5)
LDLC SERPL CALC-MCNC: 66 MG/DL (ref 63–159)
LYMPHOCYTES # BLD AUTO: 1.3 K/UL (ref 1–4.8)
LYMPHOCYTES NFR BLD: 39.5 % (ref 18–48)
MCH RBC QN AUTO: 27.5 PG (ref 27–31)
MCHC RBC AUTO-ENTMCNC: 31.8 G/DL (ref 32–36)
MCV RBC AUTO: 87 FL (ref 82–98)
MONOCYTES # BLD AUTO: 0.3 K/UL (ref 0.3–1)
MONOCYTES NFR BLD: 8.6 % (ref 4–15)
NEUTROPHILS # BLD AUTO: 1.6 K/UL (ref 1.8–7.7)
NEUTROPHILS NFR BLD: 49.1 % (ref 38–73)
NONHDLC SERPL-MCNC: 88 MG/DL
NRBC BLD-RTO: 0 /100 WBC
PLATELET # BLD AUTO: 199 K/UL (ref 150–450)
PMV BLD AUTO: 11.4 FL (ref 9.2–12.9)
POTASSIUM SERPL-SCNC: 4.3 MMOL/L (ref 3.5–5.1)
PROT SERPL-MCNC: 7.7 G/DL (ref 6–8.4)
RBC # BLD AUTO: 4.54 M/UL (ref 4.6–6.2)
SODIUM SERPL-SCNC: 134 MMOL/L (ref 136–145)
TRIGL SERPL-MCNC: 110 MG/DL (ref 30–150)
TSH SERPL DL<=0.005 MIU/L-ACNC: 0.9 UIU/ML (ref 0.4–4)
WBC # BLD AUTO: 3.24 K/UL (ref 3.9–12.7)

## 2024-05-25 PROCEDURE — 85025 COMPLETE CBC W/AUTO DIFF WBC: CPT | Mod: HCNC | Performed by: INTERNAL MEDICINE

## 2024-05-25 PROCEDURE — 80053 COMPREHEN METABOLIC PANEL: CPT | Mod: HCNC | Performed by: INTERNAL MEDICINE

## 2024-05-25 PROCEDURE — 84153 ASSAY OF PSA TOTAL: CPT | Mod: HCNC | Performed by: INTERNAL MEDICINE

## 2024-05-25 PROCEDURE — 80061 LIPID PANEL: CPT | Mod: HCNC | Performed by: INTERNAL MEDICINE

## 2024-05-25 PROCEDURE — 83036 HEMOGLOBIN GLYCOSYLATED A1C: CPT | Mod: HCNC | Performed by: NURSE PRACTITIONER

## 2024-05-25 PROCEDURE — 36415 COLL VENOUS BLD VENIPUNCTURE: CPT | Mod: HCNC,PO | Performed by: INTERNAL MEDICINE

## 2024-05-25 PROCEDURE — 84443 ASSAY THYROID STIM HORMONE: CPT | Mod: HCNC | Performed by: INTERNAL MEDICINE

## 2024-05-27 NOTE — PROGRESS NOTES
CHIEF COMPLAINT: Type 2 Diabetes     HPI: Mr. Sanchez Purcell is a 68 y.o. male who was diagnosed with Type 2 DM > 12 years ago.   Mother-  from complications of dm, in  , amputation    Worked for [employer].   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  Has needle phobia.   Able to be consistent with mounjaro.   A1c is trending down 11% to 9.5%  Lab Results   Component Value Date    HGBA1C 9.5 (H) 2024     Loss 8# since last vii.   Not interested in cgm.   Not checking BG often.  Admits to eating high carbs, large portions.    Did not start xigduo-awaiting pa   Currently on glipizide 10 mg bid, mounjaro 5 mg weekl   Continues to work, walking.  Still works full time    Sat, sun- breakfast   During the week no breakfast   Eats vegetables   Drinks: occ coke  Water and lemon  Snacks: corn chips, lays potato chips, chocolate here and there   Eats out for lunch- chicken-fried  Salad: Lettuce, tomatoes, salt/pepper, granados or ranch   [place], extra granados    Lab Results   Component Value Date    HGBA1C 9.5 (H) 2024     PREVIOUS DIABETES MEDICATIONS TRIED  Metformin  Glipizide   Levemir  Jardiance  Xigduo-did not start, pa needed   Mounjaro - not every week, worried about wt loss    CURRENT DIABETIC MEDS:  glipizide 10 mg twice a day w/ meals , xigduo- needs pa, mounjaro 5 mg weekly     Diabetes Management Status    Statin: Taking  ACE/ARB: Taking    Screening or Prevention Patient's value Goal Complete/Controlled?   HgA1C Testing and Control   Lab Results   Component Value Date    HGBA1C 9.5 (H) 2024      Annually/Less than 8% No   Lipid profile : 2024 Annually Yes   LDL control Lab Results   Component Value Date    LDLCALC 66.0 2024    Annually/Less than 100 mg/dl  Yes   Nephropathy screening Lab Results   Component Value Date    LABMICR 9.0 2023     Lab Results   Component Value Date    PROTEINUA Negative 2010    Annually Yes   Blood pressure BP Readings from Last 1 Encounters:  "  05/28/24 (!) 152/86    Less than 140/90 Yes   Dilated retinal exam : 06/07/2022 Annually Yes   Foot exam   : 06/06/2023 Annually No     REVIEW OF SYSTEMS  General: no weakness, fatigue, weight loss 8#   Eyes: no double or blurred vision, eye pain, or redness  Cardiovascular: no chest pain, palpitations, edema, or murmurs.   Respiratory: no cough or dyspnea.   GI: no heartburn, nausea, or changes in bowel patterns; good appetite.   Skin: no rashes, dryness, itching, or reactions at insulin injection sites.  Neuro: no numbness, tingling, tremors, or vertigo.   Endocrine: + polyuria, polydipsia, polyphagia, heat or cold intolerance.     Vital Signs  BP (!) 152/86 (BP Location: Left arm, Patient Position: Sitting, BP Method: Medium (Manual))   Pulse 80   Ht 5' 8" (1.727 m)   Wt 95.2 kg (209 lb 14.1 oz)   SpO2 100%   BMI 31.91 kg/m²     Hemoglobin A1C   Date Value Ref Range Status   05/25/2024 9.5 (H) 4.0 - 5.6 % Final     Comment:     ADA Screening Guidelines:  5.7-6.4%  Consistent with prediabetes  >or=6.5%  Consistent with diabetes    High levels of fetal hemoglobin interfere with the HbA1C  assay. Heterozygous hemoglobin variants (HbS, HgC, etc)do  not significantly interfere with this assay.   However, presence of multiple variants may affect accuracy.     02/10/2024 11.0 (H) 4.0 - 5.6 % Final     Comment:     ADA Screening Guidelines:  5.7-6.4%  Consistent with prediabetes  >or=6.5%  Consistent with diabetes    High levels of fetal hemoglobin interfere with the HbA1C  assay. Heterozygous hemoglobin variants (HbS, HgC, etc)do  not significantly interfere with this assay.   However, presence of multiple variants may affect accuracy.     11/11/2023 10.0 (H) 4.0 - 5.6 % Final     Comment:     ADA Screening Guidelines:  5.7-6.4%  Consistent with prediabetes  >or=6.5%  Consistent with diabetes    High levels of fetal hemoglobin interfere with the HbA1C  assay. Heterozygous hemoglobin variants (HbS, HgC, " etc)do  not significantly interfere with this assay.   However, presence of multiple variants may affect accuracy.          Chemistry        Component Value Date/Time     (L) 05/25/2024 0910    K 4.3 05/25/2024 0910     05/25/2024 0910    CO2 27 05/25/2024 0910    BUN 14 05/25/2024 0910    CREATININE 1.2 05/25/2024 0910     (H) 05/25/2024 0910        Component Value Date/Time    CALCIUM 9.8 05/25/2024 0910    ALKPHOS 120 05/25/2024 0910    AST 27 05/25/2024 0910    ALT 40 05/25/2024 0910    BILITOT 0.5 05/25/2024 0910    ESTGFRAFRICA >60.0 05/31/2022 1032    EGFRNONAA >60.0 05/31/2022 1032           Lab Results   Component Value Date    TSH 0.895 05/25/2024      Lab Results   Component Value Date    CHOL 127 05/25/2024    CHOL 122 05/06/2023    CHOL 147 05/31/2022     Lab Results   Component Value Date    HDL 39 (L) 05/25/2024    HDL 41 05/06/2023    HDL 40 05/31/2022     Lab Results   Component Value Date    LDLCALC 66.0 05/25/2024    LDLCALC 57.4 (L) 05/06/2023    LDLCALC 68.8 05/31/2022     Lab Results   Component Value Date    TRIG 110 05/25/2024    TRIG 118 05/06/2023    TRIG 191 (H) 05/31/2022     Lab Results   Component Value Date    CHOLHDL 30.7 05/25/2024    CHOLHDL 33.6 05/06/2023    CHOLHDL 27.2 05/31/2022       PHYSICAL EXAMINATION  Constitutional: Appears well, no distress..  Reviewed vitals above.  Eyes: conjunctivae & lids intact; PERRLA, EOMs intact; optic discs   Neck: Supple, trachea midline.   Respiratory: No wheezes, even and unlabored  Cardiovascular: RRR;  no edema or varicosities  Lymph: deferred   Skin: warm and dry; no injection site reactions, no acanthosis nigracans observed.  Neuro:patient alert and cooperative, normal affect; steady gait.  Psychiatric: judgement & insight intact, orientation of time, place & person intact, memory; mood & affect wnl     Diabetes Foot Exam: deferred    Assessment/Plan  1. Type 2 diabetes mellitus with hyperglycemia, without long-term  current use of insulin  Hemoglobin A1C    Microalbumin/Creatinine Ratio, Urine      2. Hypertension associated with diabetes  Microalbumin/Creatinine Ratio, Urine      3. Hyperlipidemia associated with type 2 diabetes mellitus        4. Excessive daytime sleepiness        5. Type 2 diabetes mellitus with other specified complication, without long-term current use of insulin  tirzepatide (MOUNJARO) 5 mg/0.5 mL PnIj    Ambulatory referral/consult to Podiatry      6. Tinea pedis of both feet          1-5. Follow up in 4 months w/ me   A1c urine mac in 4 months   Bp elevated   Lab Results   Component Value Date    LDLCALC 66.0 05/25/2024     At goal   Pas needed- message nurse staff   Tolerating mounjaro 5 mg weekly   Sent refills  A1c goal less than 7.5%  Not interested in insulin at time or cgm  6. Podiatry, lotrimin cream rx sent

## 2024-05-28 ENCOUNTER — TELEPHONE (OUTPATIENT)
Dept: INTERNAL MEDICINE | Facility: CLINIC | Age: 69
End: 2024-05-28

## 2024-05-28 ENCOUNTER — OFFICE VISIT (OUTPATIENT)
Dept: INTERNAL MEDICINE | Facility: CLINIC | Age: 69
End: 2024-05-28
Payer: MEDICARE

## 2024-05-28 VITALS
BODY MASS INDEX: 31.81 KG/M2 | HEIGHT: 68 IN | HEART RATE: 80 BPM | WEIGHT: 209.88 LBS | SYSTOLIC BLOOD PRESSURE: 158 MMHG | OXYGEN SATURATION: 100 % | DIASTOLIC BLOOD PRESSURE: 84 MMHG

## 2024-05-28 DIAGNOSIS — E78.5 HYPERLIPIDEMIA ASSOCIATED WITH TYPE 2 DIABETES MELLITUS: ICD-10-CM

## 2024-05-28 DIAGNOSIS — B35.3 TINEA PEDIS OF BOTH FEET: ICD-10-CM

## 2024-05-28 DIAGNOSIS — E11.65 TYPE 2 DIABETES MELLITUS WITH HYPERGLYCEMIA, WITHOUT LONG-TERM CURRENT USE OF INSULIN: Primary | ICD-10-CM

## 2024-05-28 DIAGNOSIS — I15.2 HYPERTENSION ASSOCIATED WITH DIABETES: ICD-10-CM

## 2024-05-28 DIAGNOSIS — E11.69 TYPE 2 DIABETES MELLITUS WITH OTHER SPECIFIED COMPLICATION, WITHOUT LONG-TERM CURRENT USE OF INSULIN: ICD-10-CM

## 2024-05-28 DIAGNOSIS — E11.59 HYPERTENSION ASSOCIATED WITH DIABETES: ICD-10-CM

## 2024-05-28 DIAGNOSIS — E11.69 HYPERLIPIDEMIA ASSOCIATED WITH TYPE 2 DIABETES MELLITUS: ICD-10-CM

## 2024-05-28 DIAGNOSIS — G47.19 EXCESSIVE DAYTIME SLEEPINESS: ICD-10-CM

## 2024-05-28 PROBLEM — Z79.4 TYPE 2 DIABETES MELLITUS WITH HYPERGLYCEMIA, WITH LONG-TERM CURRENT USE OF INSULIN: Status: ACTIVE | Noted: 2024-05-28

## 2024-05-28 PROCEDURE — 1125F AMNT PAIN NOTED PAIN PRSNT: CPT | Mod: HCNC,CPTII,S$GLB, | Performed by: NURSE PRACTITIONER

## 2024-05-28 PROCEDURE — 3008F BODY MASS INDEX DOCD: CPT | Mod: HCNC,CPTII,S$GLB, | Performed by: NURSE PRACTITIONER

## 2024-05-28 PROCEDURE — 1159F MED LIST DOCD IN RCRD: CPT | Mod: HCNC,CPTII,S$GLB, | Performed by: NURSE PRACTITIONER

## 2024-05-28 PROCEDURE — 3077F SYST BP >= 140 MM HG: CPT | Mod: HCNC,CPTII,S$GLB, | Performed by: NURSE PRACTITIONER

## 2024-05-28 PROCEDURE — 3288F FALL RISK ASSESSMENT DOCD: CPT | Mod: HCNC,CPTII,S$GLB, | Performed by: NURSE PRACTITIONER

## 2024-05-28 PROCEDURE — 3046F HEMOGLOBIN A1C LEVEL >9.0%: CPT | Mod: HCNC,CPTII,S$GLB, | Performed by: NURSE PRACTITIONER

## 2024-05-28 PROCEDURE — 1101F PT FALLS ASSESS-DOCD LE1/YR: CPT | Mod: HCNC,CPTII,S$GLB, | Performed by: NURSE PRACTITIONER

## 2024-05-28 PROCEDURE — 99214 OFFICE O/P EST MOD 30 MIN: CPT | Mod: HCNC,S$GLB,, | Performed by: NURSE PRACTITIONER

## 2024-05-28 PROCEDURE — 1160F RVW MEDS BY RX/DR IN RCRD: CPT | Mod: HCNC,CPTII,S$GLB, | Performed by: NURSE PRACTITIONER

## 2024-05-28 PROCEDURE — 4010F ACE/ARB THERAPY RXD/TAKEN: CPT | Mod: HCNC,CPTII,S$GLB, | Performed by: NURSE PRACTITIONER

## 2024-05-28 PROCEDURE — 99999 PR PBB SHADOW E&M-EST. PATIENT-LVL IV: CPT | Mod: PBBFAC,HCNC,, | Performed by: NURSE PRACTITIONER

## 2024-05-28 PROCEDURE — 3079F DIAST BP 80-89 MM HG: CPT | Mod: HCNC,CPTII,S$GLB, | Performed by: NURSE PRACTITIONER

## 2024-05-28 RX ORDER — TIRZEPATIDE 5 MG/.5ML
5 INJECTION, SOLUTION SUBCUTANEOUS
Qty: 4 PEN | Refills: 11 | Status: SHIPPED | OUTPATIENT
Start: 2024-05-28

## 2024-05-28 RX ORDER — DAPAGLIFLOZIN AND METFORMIN HYDROCHLORIDE 5; 1000 MG/1; MG/1
1 TABLET, FILM COATED, EXTENDED RELEASE ORAL EVERY MORNING
Qty: 30 EACH | Refills: 6 | Status: SHIPPED | OUTPATIENT
Start: 2024-05-28 | End: 2024-05-29

## 2024-05-28 RX ORDER — GLIPIZIDE 10 MG/1
10 TABLET ORAL
Qty: 180 TABLET | Refills: 3 | Status: SHIPPED | OUTPATIENT
Start: 2024-05-28 | End: 2025-05-28

## 2024-05-28 RX ORDER — CLOTRIMAZOLE 1 %
CREAM (GRAM) TOPICAL 2 TIMES DAILY
Qty: 45 G | Refills: 2 | Status: SHIPPED | OUTPATIENT
Start: 2024-05-28

## 2024-05-28 NOTE — PATIENT INSTRUCTIONS
Follow up in 4 months w/Irielle   A1c urine mac prior -4 months   Podiatry 2024     Glipizide 10 mg twice a day   Xigduo 5/1000 mg in the am   Mounjaro 5 mg weekly   Clotrimazole cream -feet    Lab Results   Component Value Date    HGBA1C 9.5 (H) 05/25/2024     Goal less than 7.5%    [email or web address]  Eat fit chase  Go Kin Packspal chase  [email or web address]    mySugr chase     Goal  no higher than 200

## 2024-05-28 NOTE — TELEPHONE ENCOUNTER
Note from payer: The drug you asked for is not listed in your preferred drug list (formulary). The preferred drug(s), you may not have tried, are: Invokamet tablet Invokamet XR tablet extended release Synjardy tablet Synjardy XR tablet extended release Xigduo XR tablet extended release Your provider needs to give us medical reasons why the preferred drug(s) would not work for you and/or would have bad side effects. Sometimes a preferred drug needs more review for approval. Additionally, some preferred drugs listed may be the same drugs with different strengths or forms. [employer] may only require one strength or form of that drug to be tried. This decision was from [employer] Non-Formulary Exceptions Coverage Policy.  Payer: [employer] - Medicare Case ID: [id number]    [phone]

## 2024-05-28 NOTE — Clinical Note
Xigduo, glipizide, mounjaro -pt is willing Just need to make sure it is cleared, pas are done-thanks

## 2024-05-29 RX ORDER — EMPAGLIFLOZIN, METFORMIN HYDROCHLORIDE 10; 1000 MG/1; MG/1
TABLET, EXTENDED RELEASE ORAL
Qty: 90 TABLET | Refills: 3 | Status: SHIPPED | OUTPATIENT
Start: 2024-05-29

## 2024-06-04 ENCOUNTER — TELEPHONE (OUTPATIENT)
Dept: INTERNAL MEDICINE | Facility: CLINIC | Age: 69
End: 2024-06-04
Payer: MEDICARE

## 2024-06-04 ENCOUNTER — OFFICE VISIT (OUTPATIENT)
Dept: PODIATRY | Facility: CLINIC | Age: 69
End: 2024-06-04
Payer: MEDICARE

## 2024-06-04 VITALS — SYSTOLIC BLOOD PRESSURE: 135 MMHG | DIASTOLIC BLOOD PRESSURE: 72 MMHG

## 2024-06-04 VITALS
SYSTOLIC BLOOD PRESSURE: 135 MMHG | HEIGHT: 68 IN | WEIGHT: 209.88 LBS | BODY MASS INDEX: 31.81 KG/M2 | DIASTOLIC BLOOD PRESSURE: 72 MMHG

## 2024-06-04 DIAGNOSIS — B35.1 ONYCHOMYCOSIS DUE TO DERMATOPHYTE: ICD-10-CM

## 2024-06-04 DIAGNOSIS — E11.69 TYPE 2 DIABETES MELLITUS WITH OTHER SPECIFIED COMPLICATION, WITHOUT LONG-TERM CURRENT USE OF INSULIN: Primary | ICD-10-CM

## 2024-06-04 PROCEDURE — 1159F MED LIST DOCD IN RCRD: CPT | Mod: HCNC,CPTII,S$GLB, | Performed by: PODIATRIST

## 2024-06-04 PROCEDURE — 1126F AMNT PAIN NOTED NONE PRSNT: CPT | Mod: HCNC,CPTII,S$GLB, | Performed by: PODIATRIST

## 2024-06-04 PROCEDURE — 99203 OFFICE O/P NEW LOW 30 MIN: CPT | Mod: HCNC,S$GLB,, | Performed by: PODIATRIST

## 2024-06-04 PROCEDURE — 3078F DIAST BP <80 MM HG: CPT | Mod: HCNC,CPTII,S$GLB, | Performed by: PODIATRIST

## 2024-06-04 PROCEDURE — 3288F FALL RISK ASSESSMENT DOCD: CPT | Mod: HCNC,CPTII,S$GLB, | Performed by: PODIATRIST

## 2024-06-04 PROCEDURE — 3008F BODY MASS INDEX DOCD: CPT | Mod: HCNC,CPTII,S$GLB, | Performed by: PODIATRIST

## 2024-06-04 PROCEDURE — 3075F SYST BP GE 130 - 139MM HG: CPT | Mod: HCNC,CPTII,S$GLB, | Performed by: PODIATRIST

## 2024-06-04 PROCEDURE — 3046F HEMOGLOBIN A1C LEVEL >9.0%: CPT | Mod: HCNC,CPTII,S$GLB, | Performed by: PODIATRIST

## 2024-06-04 PROCEDURE — 1101F PT FALLS ASSESS-DOCD LE1/YR: CPT | Mod: HCNC,CPTII,S$GLB, | Performed by: PODIATRIST

## 2024-06-04 PROCEDURE — 99999 PR PBB SHADOW E&M-EST. PATIENT-LVL III: CPT | Mod: PBBFAC,HCNC,, | Performed by: PODIATRIST

## 2024-06-04 PROCEDURE — 4010F ACE/ARB THERAPY RXD/TAKEN: CPT | Mod: HCNC,CPTII,S$GLB, | Performed by: PODIATRIST

## 2024-06-04 RX ORDER — CICLOPIROX 80 MG/ML
SOLUTION TOPICAL NIGHTLY
Qty: 6.6 ML | Refills: 3 | Status: SHIPPED | OUTPATIENT
Start: 2024-06-04

## 2024-06-04 NOTE — PATIENT INSTRUCTIONS
Kerasal for fungal nails apply daily to all toenails.  Can be found at [facility]

## 2024-06-04 NOTE — PROGRESS NOTES
Subjective:     Patient ID: Sanchez Purcell is a 68 y.o. male.    Chief Complaint: Diabetes Mellitus, Diabetic Foot Exam (5/28/24 FNP Jose E), and Nail Problem    Sanchez is a 68 y.o. male who presents to the clinic upon referral from Dr. Dorantes  for evaluation and treatment of diabetic feet. Sanchez has a past medical history of Diabetes mellitus type II, Heart murmur (1990's), Hyperlipidemia, Hypertension, and Prostate cancer. Presents for diabetic foot risk assessment.       PCP: Jamin Anthony MD    Date Last Seen by PCP: per above        Hemoglobin A1C   Date Value Ref Range Status   05/25/2024 9.5 (H) 4.0 - 5.6 % Final     Comment:     ADA Screening Guidelines:  5.7-6.4%  Consistent with prediabetes  >or=6.5%  Consistent with diabetes    High levels of fetal hemoglobin interfere with the HbA1C  assay. Heterozygous hemoglobin variants (HbS, HgC, etc)do  not significantly interfere with this assay.   However, presence of multiple variants may affect accuracy.     02/10/2024 11.0 (H) 4.0 - 5.6 % Final     Comment:     ADA Screening Guidelines:  5.7-6.4%  Consistent with prediabetes  >or=6.5%  Consistent with diabetes    High levels of fetal hemoglobin interfere with the HbA1C  assay. Heterozygous hemoglobin variants (HbS, HgC, etc)do  not significantly interfere with this assay.   However, presence of multiple variants may affect accuracy.     11/11/2023 10.0 (H) 4.0 - 5.6 % Final     Comment:     ADA Screening Guidelines:  5.7-6.4%  Consistent with prediabetes  >or=6.5%  Consistent with diabetes    High levels of fetal hemoglobin interfere with the HbA1C  assay. Heterozygous hemoglobin variants (HbS, HgC, etc)do  not significantly interfere with this assay.   However, presence of multiple variants may affect accuracy.           Review of Systems   Constitutional: Negative for chills.   Cardiovascular:  Negative for chest pain and claudication.   Respiratory:  Negative for cough.    Skin:  Positive for color change,  dry skin and nail changes.   Musculoskeletal:  Positive for joint pain.   Gastrointestinal:  Negative for nausea.   Neurological:  Positive for paresthesias. Negative for numbness.   Psychiatric/Behavioral:  The patient is not nervous/anxious.         Objective:     Physical Exam  Constitutional:       Appearance: He is well-developed.      Comments: Oriented to time, place, and person.   Cardiovascular:      Comments: DP and PT pulses are palpable bilaterally. 3 sec capillary refill time and toes and feet are warm to touch proximally .  There is  hair growth on the feet and toes b/l. There is no edema b/l. No spider veins or varicosities present b/l.     Musculoskeletal:      Comments: Equinus noted b/l ankles with < 10 deg DF noted. MMT 5/5 in DF/PF/Inv/Ev resistance with no reproduction of pain in any direction. Passive range of motion of ankle and pedal joints is painless b/l.     Feet:      Right foot:      Skin integrity: No callus or dry skin.      Left foot:      Skin integrity: No callus or dry skin.   Lymphadenopathy:      Comments: Negative lymphadenopathy bilateral popliteal fossa and tarsal tunnel.   Skin:     Comments: No open lesions, lacerations or wounds noted.Interdigital spaces clean, dry and intact b/l. No erythema noted to b/l foot.  Toenails 1-5 bilaterally are elongated by 2-3 mm, thickened by 2-3 mm, discolored/yellowed, dystrophic, brittle with subungual debris.  .     Neurological:      Mental Status: He is alert.      Comments: Light touch, proprioception, and sharp/dull sensation are all intact bilaterally. Protective threshold with the [place]ien[place] monofilament is intact bilaterally.    Psychiatric:         Behavior: Behavior is cooperative.           Assessment:      Encounter Diagnoses   Name Primary?    Type 2 diabetes mellitus with other specified complication, without long-term current use of insulin Yes    Onychomycosis due to dermatophyte      Plan:     Sanchez was seen today  for diabetes mellitus, diabetic foot exam and nail problem.    Diagnoses and all orders for this visit:    Type 2 diabetes mellitus with other specified complication, without long-term current use of insulin  -     Ambulatory referral/consult to Podiatry    Onychomycosis due to dermatophyte    Other orders  -     ciclopirox (PENLAC) 8 % Soln; Apply topically nightly.      I counseled the patient on his conditions, their implications and medical management.      - Shoe inspection. Diabetic Foot Education. Patient reminded of the importance of good nutrition and blood sugar control to help prevent podiatric complications of diabetes. Patient instructed on proper foot hygeine. We discussed wearing proper shoe gear, daily foot inspections, never walking without protective shoe gear, caution putting sharp instruments to feet     - Discussed DM foot care:  Wear comfortable, proper fitting shoes. Wash feet daily. Dry well. After drying, apply moisturizer to feet (no lotion to webspaces). Inspect feet daily for skin breaks, blisters, swelling, or redness. Wear cotton socks (preferably white)  Change socks every day. Do NOT walk barefoot. Do NOT use heating pads or warm/hot water soaks     At patient's request, I discussed different treatments for toenail fungus. We discussed oral antifungals but I did not recommend them as a first line treatment since the medication is taken internally and can have side effects such as rash, taste disturbances, and liver enzyme elevation. We discussed topical Penlac to be applied daily and removed weekly. Pt. Expresses understanding and would like to try the Penlac. Rx sent to the pharmacy.     Nails 1-5 trimmed B/L     F/u one year DM foot exam sooner PRN

## 2024-06-05 ENCOUNTER — CLINICAL SUPPORT (OUTPATIENT)
Dept: REHABILITATION | Facility: HOSPITAL | Age: 69
End: 2024-06-05
Payer: MEDICARE

## 2024-06-05 DIAGNOSIS — M25.512 CHRONIC LEFT SHOULDER PAIN: Primary | ICD-10-CM

## 2024-06-05 DIAGNOSIS — G89.29 CHRONIC LEFT SHOULDER PAIN: Primary | ICD-10-CM

## 2024-06-05 PROCEDURE — 97110 THERAPEUTIC EXERCISES: CPT

## 2024-06-05 PROCEDURE — 97140 MANUAL THERAPY 1/> REGIONS: CPT

## 2024-06-05 NOTE — PROGRESS NOTES
[facility]  Occupational Therapy Treatment Note     Date: 6/5/2024  Name: Sanchez Purcell  Clinic Number: [id number]    Therapy Diagnosis:   Encounter Diagnosis   Name Primary?    Chronic left shoulder pain Yes       Physician: Jamin Anthony MD    Medical Diagnosis: M25.512,G89.29 (ICD-10-CM) - Chronic left shoulder pain   Physician Orders: Eval and treat  Evaluation Date: 4/10/2024  Plan of Care Certification Date: 4/2/2024 - 4/2/2025  Authorization Period: 7/10/24  Surgery Date and Procedure: NA  Date of Return to MD: TL     FOTO: 1/3 = 66%     Visit #: 3 of 20  Time In: 8:05 AM  Time Out: 8:58 AM  Total Billable Time: 45 minutes     Precautions: Standard, Standard    Subjective     Patient reports: Mild pain at anterior shoulder. Reports overall improvements with continued pain at night time.   He was compliant with home exercise program given last session.   Response to previous treatment:duc well  Functional change: no functional change at this time    Pain: 2/10            6/10 - at night sleeping on his left shoulder  Location: left shoulder    Objective     Shoulder ROM. Measured in degrees.    4/9/2024 4/9/2024         Left Right       Shoulder Flexion 170, T! 165       Shoulder Extension 40, T! 60       Shoulder Abduction 170 180       Shoulder ER @90 70 80       Shoulder IR @ 90 60, C!, T! 66       Horizontal ADDuction 12, p! 28         Treatment     Sanchez received the treatments listed below:      Heat pack x 8 min     therapeutic exercises to develop strength, ROM, and posture for 45 minutes including:   - Green Theraband: Row; ER; ER side step; IR side step; Lat pulls, (Hitch hikers- 1# DB today): 3 x 10 each (No IR this date)  - Shoulder extension with IR 3# dowel: 2 x 10 (NT)   - biceps stretch: 5 sec x 10 reps    - Pec doorway stretch: 5 sec x 10 reps    - SA punches: 3 x 10 with 3#  dumb bells (NT)  - upper trap stretch: 5 sec x 10 reps   - wall crawls x 15  - levator scap  "stretch: 3 x 30" (NT)  - Shoulder ER with 2# in sidelying: 3 x 10 (NT)  - LLPS for pectorals with MH on left shoulder x 10' (NT)   - supine stability alphabet with 1#   - IR towel stretch 5 sec holds x 10     manual therapy techniques: Joint mobilizations and Soft tissue Mobilization were applied to the: left shoulder for 0 minutes, including:  - Shoulder mobilization ant/post/inferior  - STM to left biceps tendon  - Scapular mobilizations    neuromuscular re-education activities to improve: Coordination and Posture for 0 minutes. The following activities were included:  NT    therapeutic activities to improve functional performance for 0  minutes, including:  NT      Patient Education and Home Exercises     Education provided:   - Shoulder posture during exercises.  - Progress towards goals     Written Home Exercises Provided: Patient instructed to cont prior HEP.  Exercises were reviewed and Sanchez was able to demonstrate them prior to the end of the session.  Sanchez demonstrated good  understanding of the home exercise program provided. See electronic medical record under Patient Instructions for exercises provided during therapy sessions.           Pt tolerated treatment well; demo'ing good endurance with strengthening exercise. Pain is improving throughout the day and at rest, however he continues to complain of worst pain remaining present at night time. Pt educated on positioning to limit rolling onto arm. Plan to progress as tolerated.       Sanchez is progressing well towards his goals and there are no updates to goals at this time. Pt prognosis is Good.     Patient will continue to benefit from skilled outpatient occupational therapy to address the deficits listed in the problem list on initial evaluation provide patient/family education and to maximize patient's level of independence in the home and community environment.     Patient's spiritual, cultural and educational needs considered and patient " agreeable to plan of care and goals.    Anticipated barriers to occupational therapy:  chronic condition occurring for over 3 months     Goals:   LTG's (6 weeks):  1)   Increase ROM 20 degrees in SE to increase functional hand use for ADLs/work/leisure activities. progressing not met 4/10/2024  2)   The pt to report at most one wake up episode at night with pain levels at worst a 2/10 by D/C. progressing not met 4/10/2024  3)   Increase ROM 10 degrees in ER to increase functional hand use for ADLs/work/leisure activities. progressing not met 4/10/2024  4)   Increase ROM to spinal level T10 in IR behind back to increase functional hand use for ADLs/work/leisure activities. progressing not met 4/10/2024  5)   Decrease complaints of pain to  1 out of 10 at worst to increase functional hand use for ADL/work/leisure activities. progressing not met 4/10/2024  6)   Patient to score at 75% or more on FOTO to demonstrate improved perception of functional shoulder use. progressing not met 4/10/2024   7)   Pt will return to near to prior level of function for ADLs and household management reporting I or Mod I with ADLs (dressing, feeding, grooming, toileting). progressing not met 4/10/2024     STG's (3 weeks)  1)   Patient to be IND with HEP and modalities for pain/edema managment. progressing not met 4/10/2024  2)   Increase ROM 10 degrees in SE to increase functional hand use for ADLs/work/leisure activities. progressing not met 4/10/2024  3)   The pt to report at most two wake up episode at night with pain levels at worst a 4/10 in 3 weeks. progressing not met 4/10/2024  4)   Increase ROM 5 degrees in ER to increase functional hand use for ADLs/work/leisure activities. progressing not met 4/10/2024  5)   Increase ROM to spinal level T12 in IR behind back to increase functional hand use for ADLs/work/leisure activities. progressing not met 4/10/2024  6)   Patient to score at 70% or more on FOTO to demonstrate improved  perception of functional shoulder use. progressing not met 4/10/2024  7)   Decrease complaints of pain to  3 out of 10 at worst to increase functional hand use for ADL/work/leisure activities. progressing not met 4/10/2024     Plan     Updates/Grading for next session:                  Continue OT MAKENNA Ross OT   6/5/2024

## 2024-06-10 ENCOUNTER — PATIENT MESSAGE (OUTPATIENT)
Dept: INTERNAL MEDICINE | Facility: CLINIC | Age: 69
End: 2024-06-10
Payer: MEDICARE

## 2024-06-12 ENCOUNTER — CLINICAL SUPPORT (OUTPATIENT)
Dept: REHABILITATION | Facility: HOSPITAL | Age: 69
End: 2024-06-12
Payer: MEDICARE

## 2024-06-12 DIAGNOSIS — G89.29 CHRONIC LEFT SHOULDER PAIN: Primary | ICD-10-CM

## 2024-06-12 DIAGNOSIS — M25.512 CHRONIC LEFT SHOULDER PAIN: Primary | ICD-10-CM

## 2024-06-12 PROCEDURE — 97110 THERAPEUTIC EXERCISES: CPT

## 2024-06-12 PROCEDURE — 97140 MANUAL THERAPY 1/> REGIONS: CPT

## 2024-06-12 NOTE — PROGRESS NOTES
"[facility]  Occupational Therapy Treatment Note     Date: 6/12/2024  Name: Sanchez Purcell  Clinic Number: [id number]    Therapy Diagnosis:   Encounter Diagnosis   Name Primary?    Chronic left shoulder pain Yes       Physician: Jamin Anthony MD    Medical Diagnosis: M25.512,G89.29 (ICD-10-CM) - Chronic left shoulder pain   Physician Orders: Eval and treat  Evaluation Date: 4/10/2024  Plan of Care Certification Date: 4/2/2024 - 4/2/2025  Authorization Period: 7/10/24  Surgery Date and Procedure: NA  Date of Return to MD: TL     FOTO: 1/3 = 66%     Visit #: 3 of 20  Time In: 8:00 AM  Time Out: 8:55 AM  Total Billable Time: 55 minutes     Precautions: Standard, Standard    Subjective     Patient reports: Pain is improving. No pain at this time. Nighttime pain is still present.   He was compliant with home exercise program given last session.   Response to previous treatment:duc well  Functional change: no functional change at this time    Pain: 2/10            6/10 - at night sleeping on his left shoulder  Location: left shoulder    Objective     Shoulder ROM. Measured in degrees.    4/9/2024 4/9/2024         Left Right       Shoulder Flexion 170, T! 165       Shoulder Extension 40, T! 60       Shoulder Abduction 170 180       Shoulder ER @90 70 80       Shoulder IR @ 90 60, C!, T! 66       Horizontal ADDuction 12, p! 28         Treatment     Sanchez received the treatments listed below:      Heat pack x 8 min     therapeutic exercises to develop strength, ROM, and posture for 45 minutes including:   - Green Theraband: Row; ER; ER side step; IR side step; Lat pulls, (Hitch hikers- 1# DB today): 3 x 10 each (No IR this date)  - Shoulder extension with IR 3# dowel: 2 x 10 (NT)   - biceps stretch: 5 sec x 10 reps (NT)   - Pec doorway stretch: 5 sec x 10 reps    - SA punches: 3 x 10 with 3#  dumb bells (NT)  - upper trap stretch: 5 sec x 10 reps   - wall crawls x 15  - levator scap stretch: 3 x 30" " (NT)  - Shoulder ER with 2# in sidelying: 3 x 10 (NT)  - LLPS for pectorals with MH on left shoulder x 10' (NT)   - supine stability alphabet with 1#   - IR towel stretch 5 sec holds x 10     manual therapy techniques: Joint mobilizations and Soft tissue Mobilization were applied to the: left shoulder for 0 minutes, including:  - Shoulder mobilization ant/post/inferior  - STM to left biceps tendon  - Scapular mobilizations    neuromuscular re-education activities to improve: Coordination and Posture for 0 minutes. The following activities were included:  NT    therapeutic activities to improve functional performance for 0  minutes, including:  NT      Patient Education and Home Exercises     Education provided:   - Shoulder posture during exercises.  - Progress towards goals     Written Home Exercises Provided: Patient instructed to cont prior HEP.  Exercises were reviewed and Sanchez was able to demonstrate them prior to the end of the session.  Sanchez demonstrated good  understanding of the home exercise program provided. See electronic medical record under Patient Instructions for exercises provided during therapy sessions.           Pt with improved tolerance to treatment today with minimal complaints of pain with all planes of ROM. AROM measured to be greater today with no pain. Plan to reassess strength next session.       Sanchez is progressing well towards his goals and there are no updates to goals at this time. Pt prognosis is Good.     Patient will continue to benefit from skilled outpatient occupational therapy to address the deficits listed in the problem list on initial evaluation provide patient/family education and to maximize patient's level of independence in the home and community environment.     Patient's spiritual, cultural and educational needs considered and patient agreeable to plan of care and goals.    Anticipated barriers to occupational therapy:  chronic condition occurring for over 3  months     Goals:   LTG's (6 weeks):  1)   Increase ROM 20 degrees in SE to increase functional hand use for ADLs/work/leisure activities. progressing not met 4/10/2024  2)   The pt to report at most one wake up episode at night with pain levels at worst a 2/10 by D/C. progressing not met 4/10/2024  3)   Increase ROM 10 degrees in ER to increase functional hand use for ADLs/work/leisure activities. progressing not met 4/10/2024  4)   Increase ROM to spinal level T10 in IR behind back to increase functional hand use for ADLs/work/leisure activities. progressing not met 4/10/2024  5)   Decrease complaints of pain to  1 out of 10 at worst to increase functional hand use for ADL/work/leisure activities. progressing not met 4/10/2024  6)   Patient to score at 75% or more on FOTO to demonstrate improved perception of functional shoulder use. progressing not met 4/10/2024   7)   Pt will return to near to prior level of function for ADLs and household management reporting I or Mod I with ADLs (dressing, feeding, grooming, toileting). progressing not met 4/10/2024     STG's (3 weeks)  1)   Patient to be IND with HEP and modalities for pain/edema managment. progressing not met 4/10/2024  2)   Increase ROM 10 degrees in SE to increase functional hand use for ADLs/work/leisure activities. progressing not met 4/10/2024  3)   The pt to report at most two wake up episode at night with pain levels at worst a 4/10 in 3 weeks. progressing not met 4/10/2024  4)   Increase ROM 5 degrees in ER to increase functional hand use for ADLs/work/leisure activities. progressing not met 4/10/2024  5)   Increase ROM to spinal level T12 in IR behind back to increase functional hand use for ADLs/work/leisure activities. progressing not met 4/10/2024  6)   Patient to score at 70% or more on FOTO to demonstrate improved perception of functional shoulder use. progressing not met 4/10/2024  7)   Decrease complaints of pain to  3 out of 10 at worst to  increase functional hand use for ADL/work/leisure activities. progressing not met 4/10/2024     Plan     Updates/Grading for next session:                  Continue OT MAKENNA Ross OT   6/12/2024

## 2024-06-15 DIAGNOSIS — I10 ESSENTIAL HYPERTENSION: ICD-10-CM

## 2024-06-15 RX ORDER — LOSARTAN POTASSIUM 50 MG/1
50 TABLET ORAL DAILY
Qty: 90 TABLET | Refills: 3 | Status: SHIPPED | OUTPATIENT
Start: 2024-06-15

## 2024-06-15 NOTE — TELEPHONE ENCOUNTER
Refill Decision Note   Sanchez Binh  is requesting a refill authorization.  Brief Assessment and Rationale for Refill:  Approve     Medication Therapy Plan:         Comments:     Note composed:5:22 PM 06/15/2024

## 2024-06-15 NOTE — TELEPHONE ENCOUNTER
No care due was identified.  [facility] Embedded Care Due Messages. Reference number: [id number].   6/15/2024 5:51:17 AM CDT

## 2024-06-19 ENCOUNTER — CLINICAL SUPPORT (OUTPATIENT)
Dept: REHABILITATION | Facility: HOSPITAL | Age: 69
End: 2024-06-19
Payer: MEDICARE

## 2024-06-19 DIAGNOSIS — M25.512 CHRONIC LEFT SHOULDER PAIN: Primary | ICD-10-CM

## 2024-06-19 DIAGNOSIS — G89.29 CHRONIC LEFT SHOULDER PAIN: Primary | ICD-10-CM

## 2024-06-19 PROCEDURE — 97110 THERAPEUTIC EXERCISES: CPT

## 2024-06-19 PROCEDURE — 97140 MANUAL THERAPY 1/> REGIONS: CPT

## 2024-06-19 PROCEDURE — 97530 THERAPEUTIC ACTIVITIES: CPT

## 2024-06-19 PROCEDURE — 97010 HOT OR COLD PACKS THERAPY: CPT

## 2024-06-19 NOTE — PROGRESS NOTES
[facility]  Occupational Therapy Treatment Note     Date: 6/19/2024  Name: Sanchez Purcell  Clinic Number: [id number]    Therapy Diagnosis:   Encounter Diagnosis   Name Primary?    Chronic left shoulder pain Yes         Physician: Jamin Anthony MD    Medical Diagnosis: M25.512,G89.29 (ICD-10-CM) - Chronic left shoulder pain   Physician Orders: Eval and treat  Evaluation Date: 4/10/2024  Plan of Care Certification Date: 4/2/2024 - 4/2/2025  Authorization Period: 7/10/24  Surgery Date and Procedure: NA  Date of Return to MD: TL     FOTO: 1/3 = 66%     Visit #: 3 of 20  Time In: 8:00 AM  Time Out: 8:55 AM  Total Billable Time: 55 minutes     Precautions: Standard, Standard    Subjective     Patient reports: Pain is improving. No pain at this time. Nighttime pain is still present.   He was compliant with home exercise program given last session.   Response to previous treatment:duc well  Functional change: no functional change at this time    Pain: 2/10            6/10 - at night sleeping on his left shoulder  Location: left shoulder    Objective     Shoulder ROM. Measured in degrees.    4/9/2024 4/9/2024         Left Right       Shoulder Flexion 170, T! 165       Shoulder Extension 40, T! 60       Shoulder Abduction 170 180       Shoulder ER @90 70 80       Shoulder IR @ 90 60, C!, T! 66       Horizontal ADDuction 12, p! 28         Treatment     Sanchez received the treatments listed below:      Heat pack x 8 min     therapeutic exercises to develop strength, ROM, and posture for 45 minutes including:   - Green Theraband: Row; ER; ER side step; IR side step; Lat pulls, ([employer]- 1# DB today): 3 x 10 each (No IR this date)  - Shoulder extension with IR 3# dowel: 2 x 10 (NT)   - biceps stretch: 5 sec x 10 reps (NT)   - Pec doorway stretch: 5 sec x 10 reps    - SA punches: 3 x 10 with 3#  dumb bells (NT)  - upper trap stretch: 5 sec x 10 reps   - wall crawls x 15  - levator scap stretch: 3 x  "30" (NT)  - Shoulder ER with 2# in sidelying: 3 x 10 (NT)  - LLPS for pectorals with MH on left shoulder x 10' (NT)   - supine stability alphabet with 1#   - IR towel stretch 5 sec holds x 10     manual therapy techniques: Joint mobilizations and Soft tissue Mobilization were applied to the: left shoulder for 0 minutes, including:  - Shoulder mobilization ant/post/inferior  - STM to left biceps tendon  - Scapular mobilizations    neuromuscular re-education activities to improve: Coordination and Posture for 0 minutes. The following activities were included:  NT    therapeutic activities to improve functional performance for 0  minutes, including:  NT      Patient Education and Home Exercises     Education provided:   - Shoulder posture during exercises.  - Progress towards goals     Written Home Exercises Provided: Patient instructed to cont prior HEP.  Exercises were reviewed and Sanchez was able to demonstrate them prior to the end of the session.  Sanchez demonstrated good  understanding of the home exercise program provided. See electronic medical record under Patient Instructions for exercises provided during therapy sessions.             Pt continues to demo improving tolerance to treatment at this time. Minimal increased pain reported throughout session with mild fatigue noted with increased reps and resistance. Greatest pain is reported to continue at nighttime.     Sanchez is progressing well towards his goals and there are no updates to goals at this time. Pt prognosis is Good.     Patient will continue to benefit from skilled outpatient occupational therapy to address the deficits listed in the problem list on initial evaluation provide patient/family education and to maximize patient's level of independence in the home and community environment.     Patient's spiritual, cultural and educational needs considered and patient agreeable to plan of care and goals.    Anticipated barriers to occupational therapy: "  chronic condition occurring for over 3 months     Goals:   LTG's (6 weeks):  1)   Increase ROM 20 degrees in SE to increase functional hand use for ADLs/work/leisure activities. progressing not met 4/10/2024  2)   The pt to report at most one wake up episode at night with pain levels at worst a 2/10 by D/C. progressing not met 4/10/2024  3)   Increase ROM 10 degrees in ER to increase functional hand use for ADLs/work/leisure activities. progressing not met 4/10/2024  4)   Increase ROM to spinal level T10 in IR behind back to increase functional hand use for ADLs/work/leisure activities. progressing not met 4/10/2024  5)   Decrease complaints of pain to  1 out of 10 at worst to increase functional hand use for ADL/work/leisure activities. progressing not met 4/10/2024  6)   Patient to score at 75% or more on FOTO to demonstrate improved perception of functional shoulder use. progressing not met 4/10/2024   7)   Pt will return to near to prior level of function for ADLs and household management reporting I or Mod I with ADLs (dressing, feeding, grooming, toileting). progressing not met 4/10/2024     STG's (3 weeks)  1)   Patient to be IND with HEP and modalities for pain/edema managment. progressing not met 4/10/2024  2)   Increase ROM 10 degrees in SE to increase functional hand use for ADLs/work/leisure activities. progressing not met 4/10/2024  3)   The pt to report at most two wake up episode at night with pain levels at worst a 4/10 in 3 weeks. progressing not met 4/10/2024  4)   Increase ROM 5 degrees in ER to increase functional hand use for ADLs/work/leisure activities. progressing not met 4/10/2024  5)   Increase ROM to spinal level T12 in IR behind back to increase functional hand use for ADLs/work/leisure activities. progressing not met 4/10/2024  6)   Patient to score at 70% or more on FOTO to demonstrate improved perception of functional shoulder use. progressing not met 4/10/2024  7)   Decrease  complaints of pain to  3 out of 10 at worst to increase functional hand use for ADL/work/leisure activities. progressing not met 4/10/2024     Plan     Updates/Grading for next session:                  Continue OT MAKENNA Ross OT   6/19/2024

## 2024-06-28 ENCOUNTER — PATIENT OUTREACH (OUTPATIENT)
Dept: ADMINISTRATIVE | Facility: HOSPITAL | Age: 69
End: 2024-06-28
Payer: MEDICARE

## 2024-06-28 NOTE — PROGRESS NOTES
Health Maintenance Due   Topic Date Due    RSV Vaccine (Age 60+ and Pregnant patients) (1 - 1-dose 60+ series) Never done    Colorectal Cancer Screening  07/08/2020    COVID-19 Vaccine (3 - Pfizer risk series) 04/28/2021    Shingles Vaccine (2 of 2) 07/26/2022    Eye Exam  06/07/2023    Diabetes Urine Screening  05/04/2024      Chart reviewed. Triggered LINKS. Updated Care Everywhere.     Nicholas Skelton CMA  Population Health Care Coordinator  Primary Care Team

## 2024-07-10 ENCOUNTER — CLINICAL SUPPORT (OUTPATIENT)
Dept: REHABILITATION | Facility: HOSPITAL | Age: 69
End: 2024-07-10
Payer: MEDICARE

## 2024-07-10 DIAGNOSIS — M25.512 CHRONIC LEFT SHOULDER PAIN: Primary | ICD-10-CM

## 2024-07-10 DIAGNOSIS — G89.29 CHRONIC LEFT SHOULDER PAIN: Primary | ICD-10-CM

## 2024-07-10 PROCEDURE — 97110 THERAPEUTIC EXERCISES: CPT

## 2024-07-10 PROCEDURE — 97140 MANUAL THERAPY 1/> REGIONS: CPT

## 2024-07-10 PROCEDURE — 97010 HOT OR COLD PACKS THERAPY: CPT

## 2024-07-10 NOTE — PROGRESS NOTES
"[facility]  Occupational Therapy Treatment Note     Date: 7/10/2024  Name: Sanchez Purcell  Clinic Number: [id number]    Therapy Diagnosis:   Encounter Diagnosis   Name Primary?    Chronic left shoulder pain Yes         Physician: Jamin Anthony MD    Medical Diagnosis: M25.512,G89.29 (ICD-10-CM) - Chronic left shoulder pain   Physician Orders: Eval and treat  Evaluation Date: 4/10/2024  Plan of Care Certification Date: 4/2/2024 - 4/2/2025  Authorization Period: 7/10/24  Surgery Date and Procedure: NA  Date of Return to MD: TL     FOTO: 1/3 = 66%     Visit #: 3 of 20  Time In: 8:00 AM  Time Out: 8:55 AM  Total Billable Time: 55 minutes     Precautions: Standard, Standard    Subjective     Patient reports:  Nighttime pain is still present.   He was compliant with home exercise program given last session.   Response to previous treatment:duc well  Functional change: no functional change at this time    Pain: 2/10            6/10 - at night sleeping on his left shoulder  Location: left shoulder    Objective     Shoulder ROM. Measured in degrees.    4/9/2024 4/9/2024         Left Right       Shoulder Flexion 170, T! 165       Shoulder Extension 40, T! 60       Shoulder Abduction 170 180       Shoulder ER @90 70 80       Shoulder IR @ 90 60, C!, T! 66       Horizontal ADDuction 12, p! 28         Treatment     Sanchez received the treatments listed below:      Heat pack x 8 min     therapeutic exercises to develop strength, ROM, and posture for 45 minutes including:   - Green Theraband: Row; ER; ER side step; IR side step; Lat pulls, ([place]vin- 1# DB today): 3 x 10 each (No IR this date)  - Shoulder extension with IR 3# dowel: 2 x 10 (NT)   - biceps stretch: 5 sec x 10 reps (NT)   - Pec doorway stretch: 5 sec x 10 reps    - SA punches: 3 x 10 with 3#  dumb bells (NT)  - upper trap stretch: 5 sec x 10 reps   - wall crawls x 15  - levator scap stretch: 3 x 30" (NT)  - Shoulder ER with 2# in " sidelying: 3 x 10 (NT)  - LLPS for pectorals with MH on left shoulder x 10' (NT)   - supine stability alphabet with 1#   - IR towel stretch 5 sec holds x 10     manual therapy techniques: Joint mobilizations and Soft tissue Mobilization were applied to the: left shoulder for 0 minutes, including:  - Shoulder mobilization ant/post/inferior  - STM to left biceps tendon  - Scapular mobilizations    neuromuscular re-education activities to improve: Coordination and Posture for 0 minutes. The following activities were included:  NT    therapeutic activities to improve functional performance for 0  minutes, including:  NT      Patient Education and Home Exercises     Education provided:   - Shoulder posture during exercises.  - Progress towards goals     Written Home Exercises Provided: Patient instructed to cont prior HEP.  Exercises were reviewed and Sanchez was able to demonstrate them prior to the end of the session.  Sanchez demonstrated good  understanding of the home exercise program provided. See electronic medical record under Patient Instructions for exercises provided during therapy sessions.             Pt responds well to stability and endurance training. Moderate fatigue noted with increased exercise. Plan to reassess as tolerated.     Sanchez is progressing well towards his goals and there are no updates to goals at this time. Pt prognosis is Good.     Patient will continue to benefit from skilled outpatient occupational therapy to address the deficits listed in the problem list on initial evaluation provide patient/family education and to maximize patient's level of independence in the home and community environment.     Patient's spiritual, cultural and educational needs considered and patient agreeable to plan of care and goals.    Anticipated barriers to occupational therapy:  chronic condition occurring for over 3 months     Goals:   LTG's (6 weeks):  1)   Increase ROM 20 degrees in SE to increase  functional hand use for ADLs/work/leisure activities. progressing not met 4/10/2024  2)   The pt to report at most one wake up episode at night with pain levels at worst a 2/10 by D/C. progressing not met 4/10/2024  3)   Increase ROM 10 degrees in ER to increase functional hand use for ADLs/work/leisure activities. progressing not met 4/10/2024  4)   Increase ROM to spinal level T10 in IR behind back to increase functional hand use for ADLs/work/leisure activities. progressing not met 4/10/2024  5)   Decrease complaints of pain to  1 out of 10 at worst to increase functional hand use for ADL/work/leisure activities. progressing not met 4/10/2024  6)   Patient to score at 75% or more on FOTO to demonstrate improved perception of functional shoulder use. progressing not met 4/10/2024   7)   Pt will return to near to prior level of function for ADLs and household management reporting I or Mod I with ADLs (dressing, feeding, grooming, toileting). progressing not met 4/10/2024     STG's (3 weeks)  1)   Patient to be IND with HEP and modalities for pain/edema managment. progressing not met 4/10/2024  2)   Increase ROM 10 degrees in SE to increase functional hand use for ADLs/work/leisure activities. progressing not met 4/10/2024  3)   The pt to report at most two wake up episode at night with pain levels at worst a 4/10 in 3 weeks. progressing not met 4/10/2024  4)   Increase ROM 5 degrees in ER to increase functional hand use for ADLs/work/leisure activities. progressing not met 4/10/2024  5)   Increase ROM to spinal level T12 in IR behind back to increase functional hand use for ADLs/work/leisure activities. progressing not met 4/10/2024  6)   Patient to score at 70% or more on FOTO to demonstrate improved perception of functional shoulder use. progressing not met 4/10/2024  7)   Decrease complaints of pain to  3 out of 10 at worst to increase functional hand use for ADL/work/leisure activities. progressing not met  4/10/2024     Plan     Updates/Grading for next session:                  Continue OT POC    Lynne Ross OT   7/10/2024

## 2024-07-11 ENCOUNTER — OFFICE VISIT (OUTPATIENT)
Dept: INTERNAL MEDICINE | Facility: CLINIC | Age: 69
End: 2024-07-11
Payer: MEDICARE

## 2024-07-11 VITALS
HEIGHT: 68 IN | BODY MASS INDEX: 31.14 KG/M2 | SYSTOLIC BLOOD PRESSURE: 138 MMHG | OXYGEN SATURATION: 95 % | HEART RATE: 50 BPM | DIASTOLIC BLOOD PRESSURE: 80 MMHG | WEIGHT: 205.5 LBS

## 2024-07-11 DIAGNOSIS — C61 PROSTATE CANCER: ICD-10-CM

## 2024-07-11 DIAGNOSIS — F40.298 NEEDLE PHOBIA: ICD-10-CM

## 2024-07-11 DIAGNOSIS — E11.65 TYPE 2 DIABETES MELLITUS WITH HYPERGLYCEMIA, WITHOUT LONG-TERM CURRENT USE OF INSULIN: ICD-10-CM

## 2024-07-11 DIAGNOSIS — I10 ESSENTIAL HYPERTENSION: ICD-10-CM

## 2024-07-11 DIAGNOSIS — E11.69 HYPERLIPIDEMIA ASSOCIATED WITH TYPE 2 DIABETES MELLITUS: ICD-10-CM

## 2024-07-11 DIAGNOSIS — E78.5 HYPERLIPIDEMIA ASSOCIATED WITH TYPE 2 DIABETES MELLITUS: ICD-10-CM

## 2024-07-11 DIAGNOSIS — Z09 FOLLOW-UP EXAM: Primary | ICD-10-CM

## 2024-07-11 LAB — GLUCOSE SERPL-MCNC: 294 MG/DL (ref 70–110)

## 2024-07-11 PROCEDURE — 3046F HEMOGLOBIN A1C LEVEL >9.0%: CPT | Mod: HCNC,CPTII,S$GLB, | Performed by: INTERNAL MEDICINE

## 2024-07-11 PROCEDURE — 99999 PR PBB SHADOW E&M-EST. PATIENT-LVL III: CPT | Mod: PBBFAC,HCNC,, | Performed by: INTERNAL MEDICINE

## 2024-07-11 PROCEDURE — 1159F MED LIST DOCD IN RCRD: CPT | Mod: HCNC,CPTII,S$GLB, | Performed by: INTERNAL MEDICINE

## 2024-07-11 PROCEDURE — 1101F PT FALLS ASSESS-DOCD LE1/YR: CPT | Mod: HCNC,CPTII,S$GLB, | Performed by: INTERNAL MEDICINE

## 2024-07-11 PROCEDURE — 4010F ACE/ARB THERAPY RXD/TAKEN: CPT | Mod: HCNC,CPTII,S$GLB, | Performed by: INTERNAL MEDICINE

## 2024-07-11 PROCEDURE — 3075F SYST BP GE 130 - 139MM HG: CPT | Mod: HCNC,CPTII,S$GLB, | Performed by: INTERNAL MEDICINE

## 2024-07-11 PROCEDURE — 3008F BODY MASS INDEX DOCD: CPT | Mod: HCNC,CPTII,S$GLB, | Performed by: INTERNAL MEDICINE

## 2024-07-11 PROCEDURE — 1126F AMNT PAIN NOTED NONE PRSNT: CPT | Mod: HCNC,CPTII,S$GLB, | Performed by: INTERNAL MEDICINE

## 2024-07-11 PROCEDURE — 1160F RVW MEDS BY RX/DR IN RCRD: CPT | Mod: HCNC,CPTII,S$GLB, | Performed by: INTERNAL MEDICINE

## 2024-07-11 PROCEDURE — 3288F FALL RISK ASSESSMENT DOCD: CPT | Mod: HCNC,CPTII,S$GLB, | Performed by: INTERNAL MEDICINE

## 2024-07-11 PROCEDURE — 99214 OFFICE O/P EST MOD 30 MIN: CPT | Mod: HCNC,S$GLB,, | Performed by: INTERNAL MEDICINE

## 2024-07-11 PROCEDURE — 3079F DIAST BP 80-89 MM HG: CPT | Mod: HCNC,CPTII,S$GLB, | Performed by: INTERNAL MEDICINE

## 2024-07-11 NOTE — PROGRESS NOTES
Subjective:       Patient ID: Sanchez Purcell is a 68 y.o. male.    Chief Complaint: Follow-up      HPI  Sanchez Purcell is a 68 y.o. year old male with HTN, HLD, t2DM (last A1c 9.5), history of prostate cancer (s/p RALP 02/10/2017) presents for follow up. Last A1c 9.5, improved from 11. Taking medications as prescribed. Since last visit, followed up with urology and with diabetes NP. Is scheduled to see ophthalmology in 1 month.   Has repeat A1c and clinic appt in 10/2024 with diabetes NP.    Review of Systems   Constitutional:  Negative for activity change, appetite change, chills, fatigue, fever and unexpected weight change.   HENT:  Negative for congestion, rhinorrhea and sore throat.    Eyes:  Negative for visual disturbance.   Respiratory:  Negative for shortness of breath.    Cardiovascular:  Negative for chest pain.   Gastrointestinal:  Negative for abdominal pain, diarrhea, nausea and vomiting.   Genitourinary:  Negative for difficulty urinating and dysuria.   Musculoskeletal:  Negative for arthralgias, back pain and myalgias.   Skin:  Negative for color change and rash.   Neurological:  Negative for dizziness, weakness and headaches.         Past Medical History:   Diagnosis Date    Diabetes mellitus type II     Heart murmur 1990's    Hyperlipidemia     Hypertension     Prostate cancer         Prior to Admission medications    Medication Sig Start Date End Date Taking? Authorizing Provider   atorvastatin (LIPITOR) 20 MG tablet Take 1 tablet (20 mg total) by mouth every evening. 10/9/23  Yes Wally Dorantes APRN, FNP   ciclopirox (PENLAC) 8 % Soln Apply topically nightly. 6/4/24  Yes Kim Scott DPM   clotrimazole (LOTRIMIN) 1 % cream Apply topically 2 (two) times daily. 5/28/24  Yes Wally Dorantes, SHARLENE, FNP   empagliflozin-metformin (SYNJARDY XR) 10-1,000 mg TBph Take 1 tablet daily 5/29/24  Yes Wally Dorantes, APRN, FNP   fenofibrate (TRICOR) 54 MG tablet TAKE 1 TABLET(54 MG) BY MOUTH  "EVERY DAY 9/11/23  Yes Jamin Anthony MD   glipiZIDE (GLUCOTROL) 10 MG tablet Take 1 tablet (10 mg total) by mouth 2 (two) times daily before meals. 5/28/24 5/28/25 Yes Wally Dorantes APRN, FNP   losartan (COZAAR) 50 MG tablet Take 1 tablet (50 mg total) by mouth once daily. 6/15/24  Yes Jamin Anthony MD   pen needle, diabetic (BD RICHY 2ND GEN PEN NEEDLE) 32 gauge x 5/32" Ndle Use 1x a day. 2/15/24  Yes Wally Dorantes APRN, FNP   tirzepatide (MOUNJARO) 5 mg/0.5 mL PnIj Inject 5 mg into the skin every 7 days. 5/28/24  Yes Wally Dorantes APRN, FNP        Past medical history, surgical history, and family medical history reviewed and updated as appropriate.    Medications and allergies reviewed.     Objective:          Vitals:    07/11/24 1040   BP: 138/80   BP Location: Right arm   Patient Position: Sitting   Pulse: (!) 50   SpO2: 95%   Weight: 93.2 kg (205 lb 7.5 oz)   Height: 5' 8" (1.727 m)     Body mass index is 31.24 kg/m².  Physical Exam  Constitutional:       General: He is not in acute distress.     Appearance: He is well-developed.   HENT:      Head: Normocephalic and atraumatic.      Nose: Nose normal.   Eyes:      General: No scleral icterus.     Extraocular Movements: Extraocular movements intact.   Neck:      Thyroid: No thyromegaly.      Vascular: No JVD.      Trachea: No tracheal deviation.   Cardiovascular:      Rate and Rhythm: Normal rate and regular rhythm.      Heart sounds: Normal heart sounds. No murmur heard.     No friction rub. No gallop.   Pulmonary:      Effort: Pulmonary effort is normal. No respiratory distress.      Breath sounds: Normal breath sounds. No wheezing or rales.   Abdominal:      General: Bowel sounds are normal. There is no distension.      Palpations: Abdomen is soft. There is no mass.      Tenderness: There is no abdominal tenderness.   Musculoskeletal:         General: No tenderness. Normal range of motion.      Cervical back: Normal range of motion and neck " supple.   Lymphadenopathy:      Cervical: No cervical adenopathy.   Skin:     General: Skin is warm and dry.      Findings: No rash.   Neurological:      Mental Status: He is alert and oriented to person, place, and time.      Cranial Nerves: No cranial nerve deficit.      Deep Tendon Reflexes: Reflexes normal.   Psychiatric:         Behavior: Behavior normal.         Lab Results   Component Value Date    WBC 3.24 (L) 05/25/2024    HGB 12.5 (L) 05/25/2024    HCT 39.3 (L) 05/25/2024     05/25/2024    CHOL 127 05/25/2024    TRIG 110 05/25/2024    HDL 39 (L) 05/25/2024    ALT 40 05/25/2024    AST 27 05/25/2024     (L) 05/25/2024    K 4.3 05/25/2024     05/25/2024    CREATININE 1.2 05/25/2024    BUN 14 05/25/2024    CO2 27 05/25/2024    TSH 0.895 05/25/2024    PSA 7.2 (H) 07/29/2016    HGBA1C 9.5 (H) 05/25/2024       Assessment:       1. Follow-up exam    2. Type 2 diabetes mellitus with hyperglycemia, without long-term current use of insulin    3. Essential hypertension    4. Hyperlipidemia associated with type 2 diabetes mellitus    5. Needle phobia    6. Prostate cancer          Plan:     Sanchez was seen today for follow-up.    Diagnoses and all orders for this visit:    Follow-up exam    Type 2 diabetes mellitus with hyperglycemia, without long-term current use of insulin  Comments:  on synjardy XR , glipizide 10, mounjaro 5. Continues to lose weight. last a1c 9.5, goal <7.5; has f/u labs and appointment with Wally 10/2024  Orders:  -     POCT Glucose, Hand-Held Device    Essential hypertension  Comments:  on losartan 50 mg daily, did not take today. Elevated in clinic today, pt to do BP logs and nurse visit in 2 weeks. If not controlled, will add HCTZ 12.5    Hyperlipidemia associated with type 2 diabetes mellitus  Comments:  on atorvastatin, controlled, no changes to management    Needle phobia    Prostate cancer  Comments:  s/p RALP 02/10/2017, follows with urology, PSA 0.02.    Benign  physical examination, no issues identified. Will obtain routine labwork and age appropriate health screenings.     Health maintenance reviewed with patient.    Follow up in about 6 months (around 1/11/2025).    Jamin Anthony MD  Internal Medicine / Primary Care  [facility]  7/11/2024

## 2024-07-11 NOTE — PATIENT INSTRUCTIONS
Check your blood pressures at home, twice a day, for 2 weeks, just to make sure that it is within reasonable range. Goal blood pressure <130/<80 (120s/70s).  Let the office know if your blood pressure is not near goal. Nurse visit in 2 weeks for blood pressure check.     Return to clinic in 6 months or sooner if needed.

## 2024-07-17 ENCOUNTER — CLINICAL SUPPORT (OUTPATIENT)
Dept: REHABILITATION | Facility: HOSPITAL | Age: 69
End: 2024-07-17
Payer: MEDICARE

## 2024-07-17 DIAGNOSIS — G89.29 CHRONIC LEFT SHOULDER PAIN: Primary | ICD-10-CM

## 2024-07-17 DIAGNOSIS — M25.512 CHRONIC LEFT SHOULDER PAIN: Primary | ICD-10-CM

## 2024-07-17 PROCEDURE — 97140 MANUAL THERAPY 1/> REGIONS: CPT

## 2024-07-17 PROCEDURE — 97010 HOT OR COLD PACKS THERAPY: CPT

## 2024-07-17 PROCEDURE — 97110 THERAPEUTIC EXERCISES: CPT

## 2024-07-18 NOTE — PROGRESS NOTES
"[facility]  Occupational Therapy Treatment Note     Date: 7/17/2024  Name: Sanchez Purcell  Clinic Number: [id number]    Therapy Diagnosis:   Encounter Diagnosis   Name Primary?    Chronic left shoulder pain Yes         Physician: Jamin Anthony MD    Medical Diagnosis: M25.512,G89.29 (ICD-10-CM) - Chronic left shoulder pain   Physician Orders: Eval and treat  Evaluation Date: 4/10/2024  Plan of Care Certification Date: 4/2/2024 - 4/2/2025  Authorization Period: 7/10/24  Surgery Date and Procedure: NA  Date of Return to MD: TL     FOTO: 1/3 = 66%     Visit #: 9 of 20  Time In: 8:00 AM  Time Out: 8:55 AM  Total Billable Time: 55 minutes     Precautions: Standard, Standard    Subjective     Patient reports:  Continues with pain primarily at night  He was compliant with home exercise program given last session.   Response to previous treatment:duc well  Functional change: no functional change at this time    Pain: 2/10            6/10 - at night sleeping on his left shoulder  Location: left shoulder    Objective     Shoulder ROM. Measured in degrees.    4/9/2024 4/9/2024         Left Right       Shoulder Flexion 170, T! 165       Shoulder Extension 40, T! 60       Shoulder Abduction 170 180       Shoulder ER @90 70 80       Shoulder IR @ 90 60, C!, T! 66       Horizontal ADDuction 12, p! 28         Treatment     Sanchez received the treatments listed below:      Heat pack x 8 min     therapeutic exercises to develop strength, ROM, and posture for 45 minutes including:   - Green Theraband: Row; ER; ER side step; IR side step; Lat pulls, (Stewart simpson- 1# DB today): 3 x 10 each (No IR this date)  - Shoulder extension with IR 3# dowel: 2 x 10 (NT)   - biceps stretch: 5 sec x 10 reps (NT)   - Pec doorway stretch: 5 sec x 10 reps    - SA punches: 3 x 10 with 3#  dumb bells (NT)  - upper trap stretch: 5 sec x 10 reps   - wall crawls x 15  - levator scap stretch: 3 x 30" (NT)  - Shoulder ER with 2# in " sidelying: 3 x 10 (NT)  - LLPS for pectorals with MH on left shoulder x 10' (NT)   - supine stability alphabet with 1#   - IR towel stretch 5 sec holds x 10     manual therapy techniques: Joint mobilizations and Soft tissue Mobilization were applied to the: left shoulder for 0 minutes, including:  - Shoulder mobilization ant/post/inferior  - STM to left biceps tendon  - Scapular mobilizations    - Ktape trap inhibition    neuromuscular re-education activities to improve: Coordination and Posture for 0 minutes. The following activities were included:  NT    therapeutic activities to improve functional performance for 0  minutes, including:  NT      Patient Education and Home Exercises     Education provided:   - Shoulder posture during exercises.  - Progress towards goals     Written Home Exercises Provided: Patient instructed to cont prior HEP.  Exercises were reviewed and Sanchez was able to demonstrate them prior to the end of the session.  Sanchez demonstrated good  understanding of the home exercise program provided. See electronic medical record under Patient Instructions for exercises provided during therapy sessions.             Pt complains of fatigue today with increased reps and resistance. Pt reports improved pain when gliding is performed to inhibit upper trap. Ktape tolerated well in session. Plan to progress as tolerated.   Sanchez is progressing well towards his goals and there are no updates to goals at this time. Pt prognosis is Good.     Patient will continue to benefit from skilled outpatient occupational therapy to address the deficits listed in the problem list on initial evaluation provide patient/family education and to maximize patient's level of independence in the home and community environment.     Patient's spiritual, cultural and educational needs considered and patient agreeable to plan of care and goals.    Anticipated barriers to occupational therapy:  chronic condition occurring for  over 3 months     Goals:   LTG's (6 weeks):  1)   Increase ROM 20 degrees in SE to increase functional hand use for ADLs/work/leisure activities. progressing not met 4/10/2024  2)   The pt to report at most one wake up episode at night with pain levels at worst a 2/10 by D/C. progressing not met 4/10/2024  3)   Increase ROM 10 degrees in ER to increase functional hand use for ADLs/work/leisure activities. progressing not met 4/10/2024  4)   Increase ROM to spinal level T10 in IR behind back to increase functional hand use for ADLs/work/leisure activities. progressing not met 4/10/2024  5)   Decrease complaints of pain to  1 out of 10 at worst to increase functional hand use for ADL/work/leisure activities. progressing not met 4/10/2024  6)   Patient to score at 75% or more on FOTO to demonstrate improved perception of functional shoulder use. progressing not met 4/10/2024   7)   Pt will return to near to prior level of function for ADLs and household management reporting I or Mod I with ADLs (dressing, feeding, grooming, toileting). progressing not met 4/10/2024     STG's (3 weeks)  1)   Patient to be IND with HEP and modalities for pain/edema managment. progressing not met 4/10/2024  2)   Increase ROM 10 degrees in SE to increase functional hand use for ADLs/work/leisure activities. progressing not met 4/10/2024  3)   The pt to report at most two wake up episode at night with pain levels at worst a 4/10 in 3 weeks. progressing not met 4/10/2024  4)   Increase ROM 5 degrees in ER to increase functional hand use for ADLs/work/leisure activities. progressing not met 4/10/2024  5)   Increase ROM to spinal level T12 in IR behind back to increase functional hand use for ADLs/work/leisure activities. progressing not met 4/10/2024  6)   Patient to score at 70% or more on FOTO to demonstrate improved perception of functional shoulder use. progressing not met 4/10/2024  7)   Decrease complaints of pain to  3 out of 10 at  worst to increase functional hand use for ADL/work/leisure activities. progressing not met 4/10/2024     Plan     Updates/Grading for next session:                  Continue OT MAKENNA Ross OT   7/17/2024

## 2024-08-05 ENCOUNTER — OFFICE VISIT (OUTPATIENT)
Dept: OPTOMETRY | Facility: CLINIC | Age: 69
End: 2024-08-05
Payer: MEDICARE

## 2024-08-05 DIAGNOSIS — E11.65 TYPE 2 DIABETES MELLITUS WITH HYPERGLYCEMIA, WITH LONG-TERM CURRENT USE OF INSULIN: ICD-10-CM

## 2024-08-05 DIAGNOSIS — Z79.4 TYPE 2 DIABETES MELLITUS WITH HYPERGLYCEMIA, WITH LONG-TERM CURRENT USE OF INSULIN: ICD-10-CM

## 2024-08-05 DIAGNOSIS — H52.13 MYOPIA WITH PRESBYOPIA OF BOTH EYES: ICD-10-CM

## 2024-08-05 DIAGNOSIS — E11.9 DIABETES MELLITUS TYPE 2 WITHOUT RETINOPATHY: Primary | ICD-10-CM

## 2024-08-05 DIAGNOSIS — H25.13 CATARACT, NUCLEAR SCLEROTIC, BOTH EYES: ICD-10-CM

## 2024-08-05 DIAGNOSIS — H52.4 MYOPIA WITH PRESBYOPIA OF BOTH EYES: ICD-10-CM

## 2024-08-05 PROCEDURE — 1101F PT FALLS ASSESS-DOCD LE1/YR: CPT | Mod: HCNC,CPTII,S$GLB, | Performed by: OPTOMETRIST

## 2024-08-05 PROCEDURE — 1126F AMNT PAIN NOTED NONE PRSNT: CPT | Mod: HCNC,CPTII,S$GLB, | Performed by: OPTOMETRIST

## 2024-08-05 PROCEDURE — 3288F FALL RISK ASSESSMENT DOCD: CPT | Mod: HCNC,CPTII,S$GLB, | Performed by: OPTOMETRIST

## 2024-08-05 PROCEDURE — 92014 COMPRE OPH EXAM EST PT 1/>: CPT | Mod: HCNC,S$GLB,, | Performed by: OPTOMETRIST

## 2024-08-05 PROCEDURE — 92015 DETERMINE REFRACTIVE STATE: CPT | Mod: HCNC,S$GLB,, | Performed by: OPTOMETRIST

## 2024-08-05 PROCEDURE — 99999 PR PBB SHADOW E&M-EST. PATIENT-LVL III: CPT | Mod: PBBFAC,HCNC,, | Performed by: OPTOMETRIST

## 2024-08-05 PROCEDURE — 3046F HEMOGLOBIN A1C LEVEL >9.0%: CPT | Mod: HCNC,CPTII,S$GLB, | Performed by: OPTOMETRIST

## 2024-08-05 PROCEDURE — 4010F ACE/ARB THERAPY RXD/TAKEN: CPT | Mod: HCNC,CPTII,S$GLB, | Performed by: OPTOMETRIST

## 2024-08-05 PROCEDURE — 1159F MED LIST DOCD IN RCRD: CPT | Mod: HCNC,CPTII,S$GLB, | Performed by: OPTOMETRIST

## 2024-08-05 PROCEDURE — 2023F DILAT RTA XM W/O RTNOPTHY: CPT | Mod: HCNC,CPTII,S$GLB, | Performed by: OPTOMETRIST

## 2024-08-05 PROCEDURE — 1160F RVW MEDS BY RX/DR IN RCRD: CPT | Mod: HCNC,CPTII,S$GLB, | Performed by: OPTOMETRIST

## 2024-09-26 ENCOUNTER — PATIENT OUTREACH (OUTPATIENT)
Dept: ADMINISTRATIVE | Facility: HOSPITAL | Age: 69
End: 2024-09-26
Payer: MEDICARE

## 2024-09-26 NOTE — PROGRESS NOTES
Health Maintenance Due   Topic Date Due    RSV Vaccine (Age 60+ and Pregnant patients) (1 - 1-dose 60+ series) Never done    Colorectal Cancer Screening  07/08/2020    COVID-19 Vaccine (3 - Pfizer risk series) 04/28/2021    Shingles Vaccine (2 of 2) 07/26/2022    Diabetes Urine Screening  05/04/2024    Hemoglobin A1c  08/25/2024    Influenza Vaccine (1) Never done     Triggered LINKS and reconciled immunizations. Updated Care Everywhere. Linked urine microalbumin screening to upcoming scheduled lab appt on 10/5/2024. Chart review completed as part of August [place] [employer] Gap List report.

## 2024-10-05 ENCOUNTER — LAB VISIT (OUTPATIENT)
Dept: LAB | Facility: HOSPITAL | Age: 69
End: 2024-10-05
Attending: NURSE PRACTITIONER
Payer: MEDICARE

## 2024-10-05 DIAGNOSIS — E11.65 TYPE 2 DIABETES MELLITUS WITH HYPERGLYCEMIA, WITHOUT LONG-TERM CURRENT USE OF INSULIN: ICD-10-CM

## 2024-10-05 LAB
ESTIMATED AVG GLUCOSE: 329 MG/DL (ref 68–131)
HBA1C MFR BLD: 13.1 % (ref 4–5.6)

## 2024-10-05 PROCEDURE — 36415 COLL VENOUS BLD VENIPUNCTURE: CPT | Mod: HCNC,PO | Performed by: NURSE PRACTITIONER

## 2024-10-05 PROCEDURE — 83036 HEMOGLOBIN GLYCOSYLATED A1C: CPT | Mod: HCNC | Performed by: NURSE PRACTITIONER

## 2024-10-07 ENCOUNTER — PATIENT MESSAGE (OUTPATIENT)
Dept: INTERNAL MEDICINE | Facility: CLINIC | Age: 69
End: 2024-10-07
Payer: MEDICARE

## 2024-10-07 NOTE — PROGRESS NOTES
CHIEF COMPLAINT: Type 2 Diabetes     HPI: Mr. Sanchez Purcell is a 69 y.o. male who was diagnosed with Type 2 DM > 12 years ago.   Mother-  from complications of dm, in  , amputation    Worked for power company.   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  Has needle phobia.   Stopped mounjaro x 2 months ago.   Did not get glipizide or synjardy.   A1c uncontrolled  Concerned with weight loss 20-25#.  A1c trends: 11% to 9.5% to 13.1%   Lab Results   Component Value Date    HGBA1C 13.1 (H) 10/05/2024   Not interested in cgm.   Worn- fell off easily     Not checking BG often.  Admits to eating high carbs, large portions.    Continues to work, walking.  Still works full time    Sat, sun- breakfast   During the week no breakfast   Eats vegetables   Drinks: occ coke  Water and lemon  Snacks: corn chips, lays potato chips, chocolate here and there   Eats out for lunch- chicken-fried  Salad: Lettuce, tomatoes, salt/pepper, granados or ranch   [place], extra granados    Lab Results   Component Value Date    HGBA1C 13.1 (H) 10/05/2024     PREVIOUS DIABETES MEDICATIONS TRIED  Metformin  Glipizide   Levemir  Jardiance  Xigduo-did not start, pa needed   Mounjaro - not every week, worried about wt loss    CURRENT DIABETIC MEDS: off meds currently     Diabetes Management Status    Statin: Taking  ACE/ARB: Taking    Screening or Prevention Patient's value Goal Complete/Controlled?   HgA1C Testing and Control   Lab Results   Component Value Date    HGBA1C 13.1 (H) 10/05/2024      Annually/Less than 8% No   Lipid profile : 2024 Annually Yes   LDL control Lab Results   Component Value Date    LDLCALC 66.0 2024    Annually/Less than 100 mg/dl  Yes   Nephropathy screening Lab Results   Component Value Date    LABMICR 15.0 10/05/2024     Lab Results   Component Value Date    PROTEINUA Negative 2010    Annually Yes   Blood pressure BP Readings from Last 1 Encounters:   24 138/80    Less than 140/90 Yes   Dilated retinal exam :  "08/05/2024 Annually Yes   Foot exam   : 06/04/2024 Annually No     REVIEW OF SYSTEMS  General: no weakness, fatigue, weight loss 20-25#   Eyes: no double or blurred vision, eye pain, or redness  Cardiovascular: no chest pain, palpitations, edema, or murmurs.   Respiratory: no cough or dyspnea.   GI: no heartburn, nausea, or changes in bowel patterns; good appetite.   Skin: no rashes, dryness, itching, or reactions at insulin injection sites.  Neuro: no numbness, tingling, tremors, or vertigo.   Endocrine: + polyuria, polydipsia, polyphagia, heat or cold intolerance.     Vital Signs  /78 (BP Location: Left arm, Patient Position: Sitting)   Pulse 74   Ht 5' 8" (1.727 m)   Wt 92.6 kg (204 lb 2.3 oz)   SpO2 99%   BMI 31.04 kg/m²     Hemoglobin A1C   Date Value Ref Range Status   10/05/2024 13.1 (H) 4.0 - 5.6 % Final     Comment:     ADA Screening Guidelines:  5.7-6.4%  Consistent with prediabetes  >or=6.5%  Consistent with diabetes    High levels of fetal hemoglobin interfere with the HbA1C  assay. Heterozygous hemoglobin variants (HbS, HgC, etc)do  not significantly interfere with this assay.   However, presence of multiple variants may affect accuracy.     05/25/2024 9.5 (H) 4.0 - 5.6 % Final     Comment:     ADA Screening Guidelines:  5.7-6.4%  Consistent with prediabetes  >or=6.5%  Consistent with diabetes    High levels of fetal hemoglobin interfere with the HbA1C  assay. Heterozygous hemoglobin variants (HbS, HgC, etc)do  not significantly interfere with this assay.   However, presence of multiple variants may affect accuracy.     02/10/2024 11.0 (H) 4.0 - 5.6 % Final     Comment:     ADA Screening Guidelines:  5.7-6.4%  Consistent with prediabetes  >or=6.5%  Consistent with diabetes    High levels of fetal hemoglobin interfere with the HbA1C  assay. Heterozygous hemoglobin variants (HbS, HgC, etc)do  not significantly interfere with this assay.   However, presence of multiple variants may affect " accuracy.          Chemistry        Component Value Date/Time     (L) 05/25/2024 0910    K 4.3 05/25/2024 0910     05/25/2024 0910    CO2 27 05/25/2024 0910    BUN 14 05/25/2024 0910    CREATININE 1.2 05/25/2024 0910     (H) 05/25/2024 0910        Component Value Date/Time    CALCIUM 9.8 05/25/2024 0910    ALKPHOS 120 05/25/2024 0910    AST 27 05/25/2024 0910    ALT 40 05/25/2024 0910    BILITOT 0.5 05/25/2024 0910    ESTGFRAFRICA >60.0 05/31/2022 1032    EGFRNONAA >60.0 05/31/2022 1032           Lab Results   Component Value Date    TSH 0.895 05/25/2024      Lab Results   Component Value Date    CHOL 127 05/25/2024    CHOL 122 05/06/2023    CHOL 147 05/31/2022     Lab Results   Component Value Date    HDL 39 (L) 05/25/2024    HDL 41 05/06/2023    HDL 40 05/31/2022     Lab Results   Component Value Date    LDLCALC 66.0 05/25/2024    LDLCALC 57.4 (L) 05/06/2023    LDLCALC 68.8 05/31/2022     Lab Results   Component Value Date    TRIG 110 05/25/2024    TRIG 118 05/06/2023    TRIG 191 (H) 05/31/2022     Lab Results   Component Value Date    CHOLHDL 30.7 05/25/2024    CHOLHDL 33.6 05/06/2023    CHOLHDL 27.2 05/31/2022       PHYSICAL EXAMINATION  Constitutional: Appears well, no distress..  Reviewed vitals above.  Eyes: conjunctivae & lids intact; PERRLA, EOMs intact; optic discs   Neck: Supple, trachea midline.   Respiratory: No wheezes, even and unlabored  Cardiovascular: RRR;  no edema or varicosities  Lymph: deferred   Skin: warm and dry; no injection site reactions, no acanthosis nigracans observed.  Neuro:patient alert and cooperative, normal affect; steady gait.  Psychiatric: judgement & insight intact, orientation of time, place & person intact, memory; mood & affect wnl     Diabetes Foot Exam: deferred    Assessment/Plan  1. Type 2 diabetes mellitus with hyperglycemia, with long-term current use of insulin  Hemoglobin A1C in 4 months     Hemoglobin A1C in  6 weeks     A1c goal less than 7.5%      Add synjardy, add lantus 18 units in a.m.  Stop mounjaro  Demo done with insulin/pen needle        2. Hypertension associated with diabetes  Bp controlled  On arb   On sglt2i       3. Prostate cancer  F/u with urology   Stable       4 . Hyperlipidemia associated with type 2 diabetes mellitus  Lab Results   Component Value Date    LDLCALC 66.0 05/25/2024     At goal   On statin

## 2024-10-08 ENCOUNTER — OFFICE VISIT (OUTPATIENT)
Dept: INTERNAL MEDICINE | Facility: CLINIC | Age: 69
End: 2024-10-08
Payer: MEDICARE

## 2024-10-08 ENCOUNTER — TELEPHONE (OUTPATIENT)
Dept: INTERNAL MEDICINE | Facility: CLINIC | Age: 69
End: 2024-10-08

## 2024-10-08 VITALS
WEIGHT: 204.13 LBS | BODY MASS INDEX: 30.94 KG/M2 | OXYGEN SATURATION: 99 % | DIASTOLIC BLOOD PRESSURE: 78 MMHG | SYSTOLIC BLOOD PRESSURE: 138 MMHG | HEART RATE: 74 BPM | HEIGHT: 68 IN

## 2024-10-08 DIAGNOSIS — I15.2 HYPERTENSION ASSOCIATED WITH DIABETES: ICD-10-CM

## 2024-10-08 DIAGNOSIS — Z79.4 TYPE 2 DIABETES MELLITUS WITHOUT COMPLICATION, WITH LONG-TERM CURRENT USE OF INSULIN: ICD-10-CM

## 2024-10-08 DIAGNOSIS — E11.9 TYPE 2 DIABETES MELLITUS WITHOUT COMPLICATION, WITH LONG-TERM CURRENT USE OF INSULIN: ICD-10-CM

## 2024-10-08 DIAGNOSIS — E11.69 HYPERLIPIDEMIA ASSOCIATED WITH TYPE 2 DIABETES MELLITUS: ICD-10-CM

## 2024-10-08 DIAGNOSIS — E78.5 HYPERLIPIDEMIA ASSOCIATED WITH TYPE 2 DIABETES MELLITUS: ICD-10-CM

## 2024-10-08 DIAGNOSIS — C61 PROSTATE CANCER: ICD-10-CM

## 2024-10-08 DIAGNOSIS — Z79.4 TYPE 2 DIABETES MELLITUS WITH HYPERGLYCEMIA, WITH LONG-TERM CURRENT USE OF INSULIN: Primary | ICD-10-CM

## 2024-10-08 DIAGNOSIS — E11.59 HYPERTENSION ASSOCIATED WITH DIABETES: ICD-10-CM

## 2024-10-08 DIAGNOSIS — E11.65 TYPE 2 DIABETES MELLITUS WITH HYPERGLYCEMIA, WITH LONG-TERM CURRENT USE OF INSULIN: Primary | ICD-10-CM

## 2024-10-08 PROCEDURE — 3066F NEPHROPATHY DOC TX: CPT | Mod: HCNC,CPTII,S$GLB, | Performed by: NURSE PRACTITIONER

## 2024-10-08 PROCEDURE — 1126F AMNT PAIN NOTED NONE PRSNT: CPT | Mod: HCNC,CPTII,S$GLB, | Performed by: NURSE PRACTITIONER

## 2024-10-08 PROCEDURE — 3288F FALL RISK ASSESSMENT DOCD: CPT | Mod: HCNC,CPTII,S$GLB, | Performed by: NURSE PRACTITIONER

## 2024-10-08 PROCEDURE — 3046F HEMOGLOBIN A1C LEVEL >9.0%: CPT | Mod: HCNC,CPTII,S$GLB, | Performed by: NURSE PRACTITIONER

## 2024-10-08 PROCEDURE — 3061F NEG MICROALBUMINURIA REV: CPT | Mod: HCNC,CPTII,S$GLB, | Performed by: NURSE PRACTITIONER

## 2024-10-08 PROCEDURE — 3008F BODY MASS INDEX DOCD: CPT | Mod: HCNC,CPTII,S$GLB, | Performed by: NURSE PRACTITIONER

## 2024-10-08 PROCEDURE — 3078F DIAST BP <80 MM HG: CPT | Mod: HCNC,CPTII,S$GLB, | Performed by: NURSE PRACTITIONER

## 2024-10-08 PROCEDURE — 1101F PT FALLS ASSESS-DOCD LE1/YR: CPT | Mod: HCNC,CPTII,S$GLB, | Performed by: NURSE PRACTITIONER

## 2024-10-08 PROCEDURE — 4010F ACE/ARB THERAPY RXD/TAKEN: CPT | Mod: HCNC,CPTII,S$GLB, | Performed by: NURSE PRACTITIONER

## 2024-10-08 PROCEDURE — 99999 PR PBB SHADOW E&M-EST. PATIENT-LVL IV: CPT | Mod: PBBFAC,HCNC,, | Performed by: NURSE PRACTITIONER

## 2024-10-08 PROCEDURE — 1159F MED LIST DOCD IN RCRD: CPT | Mod: HCNC,CPTII,S$GLB, | Performed by: NURSE PRACTITIONER

## 2024-10-08 PROCEDURE — 99214 OFFICE O/P EST MOD 30 MIN: CPT | Mod: HCNC,S$GLB,, | Performed by: NURSE PRACTITIONER

## 2024-10-08 PROCEDURE — 3075F SYST BP GE 130 - 139MM HG: CPT | Mod: HCNC,CPTII,S$GLB, | Performed by: NURSE PRACTITIONER

## 2024-10-08 PROCEDURE — 1160F RVW MEDS BY RX/DR IN RCRD: CPT | Mod: HCNC,CPTII,S$GLB, | Performed by: NURSE PRACTITIONER

## 2024-10-08 RX ORDER — INSULIN GLARGINE 100 [IU]/ML
INJECTION, SOLUTION SUBCUTANEOUS
Qty: 15 ML | Refills: 6 | Status: SHIPPED | OUTPATIENT
Start: 2024-10-08

## 2024-10-08 RX ORDER — PEN NEEDLE, DIABETIC 30 GX3/16"
NEEDLE, DISPOSABLE MISCELLANEOUS
Qty: 100 EACH | Refills: 3 | Status: SHIPPED | OUTPATIENT
Start: 2024-10-08

## 2024-10-08 NOTE — TELEPHONE ENCOUNTER
----- Message from SHARLENE Terrazas FNP sent at 10/8/2024  9:21 AM CDT -----  Regarding: a1c >13%, reschedule  Hi!  Please reach out to pt   A1c >13%  Concerned  Thanks  Wally  Needs reschedule and DE appt.  
Spoke with pt. and he stated that is going to work progressively to get sugar levels in range and start his new medication regime. Tried to get him scheduled for DE appt. with Mrs. Persaud but was not able to add to her schedule. Informed pt. that he will be a getting a return call to have this appt. booked because I was not able to do so, sorry. He said he has an open availability, if Mrs. Monique could get him scheduled please.Thank you.   
[place]

## 2024-10-08 NOTE — PATIENT INSTRUCTIONS
Follow up in 4 months w/Irielle   A1c in 6 weeks   A1c in 4 months     Lantus 18 units in the morning -every morning/24 hours  Pen needles needed   Synjardy 10/1000 mg xr daily   Stop mounjaro    [email or web address]  Eat fit chase  Myfitnesspal chase  [email or web address]    mySugr chase     Goal  no higher than 220     Lab Results   Component Value Date    HGBA1C 13.1 (H) 10/05/2024     Goal less than 7.5%

## 2024-10-09 ENCOUNTER — TELEPHONE (OUTPATIENT)
Dept: DIABETES | Facility: CLINIC | Age: 69
End: 2024-10-09
Payer: MEDICARE

## 2024-10-09 ENCOUNTER — PATIENT MESSAGE (OUTPATIENT)
Dept: DIABETES | Facility: CLINIC | Age: 69
End: 2024-10-09
Payer: MEDICARE

## 2024-10-27 DIAGNOSIS — E11.69 HYPERLIPIDEMIA ASSOCIATED WITH TYPE 2 DIABETES MELLITUS: ICD-10-CM

## 2024-10-27 DIAGNOSIS — E78.5 HYPERLIPIDEMIA ASSOCIATED WITH TYPE 2 DIABETES MELLITUS: ICD-10-CM

## 2024-10-28 RX ORDER — ATORVASTATIN CALCIUM 20 MG/1
20 TABLET, FILM COATED ORAL NIGHTLY
Qty: 90 TABLET | Refills: 3 | Status: SHIPPED | OUTPATIENT
Start: 2024-10-28

## 2024-10-29 RX ORDER — FENOFIBRATE 54 MG/1
TABLET ORAL
Qty: 90 TABLET | Refills: 1 | Status: SHIPPED | OUTPATIENT
Start: 2024-10-29

## 2025-02-19 ENCOUNTER — TELEPHONE (OUTPATIENT)
Dept: INTERNAL MEDICINE | Facility: CLINIC | Age: 70
End: 2025-02-19
Payer: MEDICARE

## 2025-02-19 NOTE — TELEPHONE ENCOUNTER
----- Message from Steph sent at 2/19/2025  9:44 AM CST -----  Caller is requesting an earlier appointment then we can schedule.  Caller is requesting a message be sent to the provider.If this is for urgent care symptoms, did you offer other providers at this location, providers at other locations, or [facility]? (yes, no, n/a):  n/aIf this is for the patients physical, did you offer to schedule next available and put on wait list, or to see NP or PA for their physical?  (yes, no, n/a):  n/aWhen is the next available appointment with their provider:  Reason for the appointment:  4 month follow up Patient preference of timeframe to be scheduled:  Would the patient like a call back, or a response through their [facility] portal?:   Comments:  pt would like to reschedule from tomorrow and labs

## 2025-02-24 DIAGNOSIS — Z00.00 ENCOUNTER FOR MEDICARE ANNUAL WELLNESS EXAM: ICD-10-CM

## 2025-03-28 ENCOUNTER — LAB VISIT (OUTPATIENT)
Dept: LAB | Facility: HOSPITAL | Age: 70
End: 2025-03-28
Payer: MEDICARE

## 2025-03-28 DIAGNOSIS — E11.65 TYPE 2 DIABETES MELLITUS WITH HYPERGLYCEMIA, WITH LONG-TERM CURRENT USE OF INSULIN: ICD-10-CM

## 2025-03-28 DIAGNOSIS — C61 PROSTATE CANCER: ICD-10-CM

## 2025-03-28 DIAGNOSIS — Z79.4 TYPE 2 DIABETES MELLITUS WITH HYPERGLYCEMIA, WITH LONG-TERM CURRENT USE OF INSULIN: ICD-10-CM

## 2025-03-28 LAB
EAG (OHS): 326 MG/DL (ref 68–131)
HBA1C MFR BLD: 13 % (ref 4–5.6)
PSA SERPL-MCNC: 0.05 NG/ML

## 2025-03-28 PROCEDURE — 84153 ASSAY OF PSA TOTAL: CPT | Mod: HCNC

## 2025-03-28 PROCEDURE — 83036 HEMOGLOBIN GLYCOSYLATED A1C: CPT | Mod: HCNC

## 2025-03-28 PROCEDURE — 36415 COLL VENOUS BLD VENIPUNCTURE: CPT | Mod: HCNC,PO

## 2025-04-02 ENCOUNTER — PATIENT MESSAGE (OUTPATIENT)
Dept: INTERNAL MEDICINE | Facility: CLINIC | Age: 70
End: 2025-04-02
Payer: MEDICARE

## 2025-04-15 ENCOUNTER — OFFICE VISIT (OUTPATIENT)
Dept: UROLOGY | Facility: CLINIC | Age: 70
End: 2025-04-15
Payer: MEDICARE

## 2025-04-15 VITALS
WEIGHT: 206.38 LBS | DIASTOLIC BLOOD PRESSURE: 89 MMHG | BODY MASS INDEX: 31.28 KG/M2 | HEART RATE: 73 BPM | SYSTOLIC BLOOD PRESSURE: 177 MMHG | HEIGHT: 68 IN

## 2025-04-15 DIAGNOSIS — Z80.42 FAMILY HISTORY OF PROSTATE CANCER: Primary | ICD-10-CM

## 2025-04-15 DIAGNOSIS — N52.9 ERECTILE DYSFUNCTION, UNSPECIFIED ERECTILE DYSFUNCTION TYPE: ICD-10-CM

## 2025-04-15 DIAGNOSIS — C61 PROSTATE CANCER: ICD-10-CM

## 2025-04-15 LAB
BILIRUB SERPL-MCNC: NORMAL MG/DL
BLOOD URINE, POC: NORMAL
CLARITY, POC UA: CLEAR
COLOR, POC UA: YELLOW
GLUCOSE UR QL STRIP: NORMAL
KETONES UR QL STRIP: NORMAL
LEUKOCYTE ESTERASE URINE, POC: NORMAL
NITRITE, POC UA: NORMAL
PH, POC UA: 5.5
PROTEIN, POC: NORMAL
SPECIFIC GRAVITY, POC UA: 1.02
UROBILINOGEN, POC UA: 0.2

## 2025-04-15 PROCEDURE — 99999 PR PBB SHADOW E&M-EST. PATIENT-LVL III: CPT | Mod: PBBFAC,HCNC,,

## 2025-04-15 RX ORDER — PAPAVERINE HYDROCHLORIDE 30 MG/ML
INJECTION INTRAMUSCULAR; INTRAVENOUS
Qty: 5 ML | Refills: 0 | Status: SHIPPED | OUTPATIENT
Start: 2025-04-15

## 2025-04-15 RX ORDER — PAPAVERINE HYDROCHLORIDE 30 MG/ML
INJECTION INTRAMUSCULAR; INTRAVENOUS
Qty: 5 ML | Refills: 0 | Status: SHIPPED | OUTPATIENT
Start: 2025-04-15 | End: 2025-04-15 | Stop reason: SDUPTHER

## 2025-04-15 NOTE — PROGRESS NOTES
CHIEF COMPLAINT:  Annual PSA      HISTORY OF PRESENTING ILLINESS:  Sanchez Purcell is a 69 y.o. male is an established patient of Dr Chavez's. He is s/p RALP 02/10/2017  Rylee 7 (3+4); (-) extension, SV, but (+) apex margin; pT2c, pN0, pMx. Perfect continence. He was last seen by Dr Chavez on 4/18/2024.  Positive family history.  PSA 0.05 on 3/28/2025. Happy with his urination status.  He has ED has tried and failed Cialis. Is interested in trying PEP.    Has worked for [employer] for 41 years. 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   Gastrointestinal:  Negative for nausea and vomiting.   Genitourinary:  Negative for dysuria, flank pain, frequency, hematuria and urgency.         PATIENT HISTORY:    Past Medical History:   Diagnosis Date    Diabetes mellitus type II     Heart murmur 1990's    Hyperlipidemia     Hypertension     Prostate cancer        Past Surgical History:   Procedure Laterality Date    COLONOSCOPY      5yrs ago    PROSTATE SURGERY         Family History   Problem Relation Name Age of Onset    Diabetes Mother      Cataracts Mother      Cancer Sister Jocelyn 53        breast    Cancer Brother  65        colon    Cancer Sister Nilsa 32        abd cancer    Cataracts Father      No Known Problems Maternal Aunt      No Known Problems Maternal Uncle      No Known Problems Paternal Aunt      No Known Problems Paternal Uncle      No Known Problems Maternal Grandmother      No Known Problems Maternal Grandfather      No Known Problems Paternal Grandmother      No Known Problems Paternal Grandfather      Amblyopia Neg Hx      Blindness Neg Hx      Glaucoma Neg Hx      Hypertension Neg Hx      Macular degeneration Neg Hx      Retinal detachment Neg Hx      Strabismus Neg Hx      Stroke Neg Hx      Thyroid disease Neg Hx         Social History[1]    Allergies:  No known drug allergies    Medications:  Current  Medications[2]    PHYSICAL EXAMINATION:  Physical Exam  Constitutional:       Appearance: Normal appearance. He is well-developed.   Pulmonary:      Effort: Pulmonary effort is normal. No respiratory distress.   Skin:     General: Skin is warm and dry.      Capillary Refill: Capillary refill takes less than 2 seconds.   Neurological:      General: No focal deficit present.      Mental Status: He is alert.   Psychiatric:         Mood and Affect: Mood normal.         Behavior: Behavior normal. Behavior is cooperative.         Thought Content: Thought content normal.           LABS:      In office UA today was normal.      Lab Results   Component Value Date    PSA 0.05 03/28/2025    PSA 7.2 (H) 07/29/2016    PSA 5.5 (H) 04/29/2015    PSADIAG 0.02 05/25/2024    PSADIAG 0.02 04/05/2024    PSADIAG 0.02 05/06/2023       Lab Results   Component Value Date    CREATININE 1.2 05/25/2024    EGFRNORACEVR >60.0 05/25/2024               IMPRESSION:    Encounter Diagnoses   Name Primary?    Family history of prostate cancer Yes    Prostate cancer     Erectile dysfunction, unspecified erectile dysfunction type          Assessment:       1. Family history of prostate cancer    2. Prostate cancer    3. Erectile dysfunction, unspecified erectile dysfunction type        Plan:         -PEP test dose ordered; RX faxed to [facility]. Pt will call the office to set up his in office injection once he receives the medication.   -PSA in 1 year    I spent a total of 30 minutes on the day of the visit. This includes face to face time and non-face to face time preparing to see the patient (eg, review of tests), obtaining and/or reviewing separately obtained history, documenting clinical information in the electronic or other health record, independently interpreting results and communicating results to the patient/family/caregiver, or care coordinator  Reviewed the possible contributory factors.  Reassurance with today's in office UA; emptying  bladder well.             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     Social Drivers of Health     Financial Resource Strain: Low Risk  (5/28/2024)    Overall Financial Resource Strain (CARDIA)     Difficulty of Paying Living Expenses: Not hard at all   Food Insecurity: No Food Insecurity (5/28/2024)    Hunger Vital Sign     Worried About Running Out of Food in the Last Year: Never true     Ran Out of Food in the Last Year: Never true   Transportation Needs: No Transportation Needs (4/18/2024)    PRAPARE - Transportation     Lack of Transportation (Medical): No     Lack of Transportation (Non-Medical): No   Physical Activity: Insufficiently Active (5/28/2024)    Exercise Vital Sign     Days of Exercise per Week: 1 day     Minutes of Exercise per Session: 30 min   Stress: No Stress Concern Present (5/28/2024)    [employer] - Occupational Stress Questionnaire     Feeling of Stress : Not at all   Housing Stability: Unknown (5/28/2024)    Housing Stability Vital Sign     Unable to Pay for Housing in the Last Year: No   [2]   Current Outpatient Medications:     atorvastatin (LIPITOR) 20 MG tablet, TAKE 1 TABLET(20 MG) BY MOUTH EVERY EVENING, Disp: 90 tablet, Rfl: 3    ciclopirox (PENLAC) 8 % Soln, Apply topically nightly., Disp: 6.6 mL, Rfl: 3    clotrimazole (LOTRIMIN) 1 % cream, Apply topically 2 (two) times daily., Disp: 45 g, Rfl: 2    fenofibrate (TRICOR) 54 MG tablet, TAKE 1 TABLET(54 MG) BY MOUTH EVERY DAY, Disp: 90 tablet, Rfl: 1    insulin glargine U-100, Lantus, (LANTUS SOLOSTAR U-100 INSULIN) 100 unit/mL (3 mL) InPn  "pen, Inject 18 units every morning., Disp: 15 mL, Rfl: 6    losartan (COZAAR) 50 MG tablet, Take 1 tablet (50 mg total) by mouth once daily., Disp: 90 tablet, Rfl: 3    pen needle, diabetic (BD RICHY 2ND GEN PEN NEEDLE) 32 gauge x 5/32" Ndle, Use 1x a day., Disp: 100 each, Rfl: 3    empagliflozin-metformin (SYNJARDY XR) 10-1,000 mg TBph, Take 1 tablet daily (Patient not taking: Reported on 4/15/2025), Disp: 90 tablet, Rfl: 3    papaverine 30 mg/mL injection, Add: Phentolamine 1 mg Add: PGE1 10 mcg  Sig:  Give a TEST DOSE; first dose to be given under medical supervision, Disp: 5 mL, Rfl: 0    "

## 2025-06-04 DIAGNOSIS — Z79.4 TYPE 2 DIABETES MELLITUS WITH HYPERGLYCEMIA, WITH LONG-TERM CURRENT USE OF INSULIN: ICD-10-CM

## 2025-06-04 DIAGNOSIS — E11.9 TYPE 2 DIABETES MELLITUS WITHOUT COMPLICATION: ICD-10-CM

## 2025-06-04 DIAGNOSIS — E11.65 TYPE 2 DIABETES MELLITUS WITH HYPERGLYCEMIA, WITH LONG-TERM CURRENT USE OF INSULIN: ICD-10-CM

## 2025-06-23 DIAGNOSIS — E78.1 HYPERTRIGLYCERIDEMIA: ICD-10-CM

## 2025-06-23 DIAGNOSIS — E11.69 HYPERLIPIDEMIA ASSOCIATED WITH TYPE 2 DIABETES MELLITUS: Primary | ICD-10-CM

## 2025-06-23 DIAGNOSIS — E78.5 HYPERLIPIDEMIA ASSOCIATED WITH TYPE 2 DIABETES MELLITUS: Primary | ICD-10-CM

## 2025-06-23 NOTE — TELEPHONE ENCOUNTER
Care Due:                  Date            Visit Type   Department     Provider  --------------------------------------------------------------------------------                                EP -                              PRIMARY      [facility] INTERNAL  Last Visit: 07-      CARE ([facility])   MEDICINE       Jamin Anthony                              EP -                              PRIMARY      [facility] INTERNAL  Next Visit: 04-      CARE ([facility])   MEDICINE       Huber Rivas                                                            Last  Test          Frequency    Reason                     Performed    Due Date  --------------------------------------------------------------------------------    CBC.........  12 months..  fenofibrate..............  05- 05-    CMP.........  12 months..  fenofibrate, losartan....  05- 05-    Lipid Panel.  12 months..  fenofibrate..............  05- 05-    [facility] Embedded Care Due Messages. Reference number: [id number].   6/23/2025 4:51:52 PM CDT

## 2025-06-24 NOTE — TELEPHONE ENCOUNTER
Refill Routing Note   Medication(s) are not appropriate for processing by [facility] for the following reason(s):        Required labs outdated    [facility] action(s):  Defer   Requires labs : Yes             Appointments  past 12m or future 3m with PCP    Date Provider   Last Visit   7/11/2024 Jamin Anthony MD   Next Visit   Visit date not found Jamin Anthony MD   ED visits in past 90 days: 0        Note composed:7:35 PM 06/23/2025

## 2025-06-25 RX ORDER — FENOFIBRATE 54 MG/1
TABLET ORAL
Qty: 90 TABLET | Refills: 1 | Status: SHIPPED | OUTPATIENT
Start: 2025-06-25

## 2025-08-25 ENCOUNTER — TELEPHONE (OUTPATIENT)
Dept: ENDOSCOPY | Facility: HOSPITAL | Age: 70
End: 2025-08-25
Payer: MEDICARE

## 2025-09-02 RX ORDER — GLIPIZIDE 10 MG/1
10 TABLET ORAL
Qty: 180 TABLET | Refills: 3 | Status: SHIPPED | OUTPATIENT
Start: 2025-09-02